# Patient Record
Sex: MALE | Race: WHITE | NOT HISPANIC OR LATINO | Employment: OTHER | URBAN - METROPOLITAN AREA
[De-identification: names, ages, dates, MRNs, and addresses within clinical notes are randomized per-mention and may not be internally consistent; named-entity substitution may affect disease eponyms.]

---

## 2017-02-14 ENCOUNTER — TELEPHONE (OUTPATIENT)
Dept: CARDIOLOGY | Facility: MEDICAL CENTER | Age: 67
End: 2017-02-14

## 2017-02-15 NOTE — TELEPHONE ENCOUNTER
----- Message from Kristi Saucedo sent at 2/14/2017  4:08 PM PST -----  Regarding: clarification of blood orders  Contact: [phone]  CHUCK/luz elena    Pt calling to ask if he needs additional blood work beyond what his lab slip states in preparation for 4/12 appt with RO. Please call pt to clarify, [phone].

## 2017-04-17 ENCOUNTER — HOSPITAL ENCOUNTER (OUTPATIENT)
Dept: LAB | Facility: MEDICAL CENTER | Age: 67
End: 2017-04-17
Attending: NURSE PRACTITIONER
Payer: MEDICARE

## 2017-04-17 ENCOUNTER — HOSPITAL ENCOUNTER (OUTPATIENT)
Dept: LAB | Facility: MEDICAL CENTER | Age: 67
End: 2017-04-17
Attending: UROLOGY
Payer: MEDICARE

## 2017-04-17 DIAGNOSIS — E78.2 MIXED HYPERLIPIDEMIA: ICD-10-CM

## 2017-04-17 LAB
ALBUMIN SERPL BCP-MCNC: 4 G/DL (ref 3.2–4.9)
ALBUMIN/GLOB SERPL: 1.4 G/DL
ALP SERPL-CCNC: 94 U/L (ref 30–99)
ALT SERPL-CCNC: 50 U/L (ref 2–50)
ANION GAP SERPL CALC-SCNC: 8 MMOL/L (ref 0–11.9)
AST SERPL-CCNC: 32 U/L (ref 12–45)
BILIRUB SERPL-MCNC: 0.8 MG/DL (ref 0.1–1.5)
BUN SERPL-MCNC: 15 MG/DL (ref 8–22)
CALCIUM SERPL-MCNC: 9.1 MG/DL (ref 8.5–10.5)
CHLORIDE SERPL-SCNC: 107 MMOL/L (ref 96–112)
CHOLEST SERPL-MCNC: 175 MG/DL (ref 100–199)
CO2 SERPL-SCNC: 25 MMOL/L (ref 20–33)
CREAT SERPL-MCNC: 1.08 MG/DL (ref 0.5–1.4)
GFR SERPL CREATININE-BSD FRML MDRD: >60 ML/MIN/1.73 M 2
GLOBULIN SER CALC-MCNC: 2.9 G/DL (ref 1.9–3.5)
GLUCOSE SERPL-MCNC: 94 MG/DL (ref 65–99)
HDLC SERPL-MCNC: 36 MG/DL
LDLC SERPL CALC-MCNC: 108 MG/DL
POTASSIUM SERPL-SCNC: 3.9 MMOL/L (ref 3.6–5.5)
PROT SERPL-MCNC: 6.9 G/DL (ref 6–8.2)
PSA SERPL-MCNC: 0.25 NG/ML (ref 0–4)
SODIUM SERPL-SCNC: 140 MMOL/L (ref 135–145)
TRIGL SERPL-MCNC: 155 MG/DL (ref 0–149)

## 2017-04-17 PROCEDURE — 84153 ASSAY OF PSA TOTAL: CPT

## 2017-04-18 ENCOUNTER — OFFICE VISIT (OUTPATIENT)
Dept: CARDIOLOGY | Facility: MEDICAL CENTER | Age: 67
End: 2017-04-18
Payer: MEDICARE

## 2017-04-18 VITALS
HEIGHT: 67 IN | HEART RATE: 64 BPM | OXYGEN SATURATION: 96 % | DIASTOLIC BLOOD PRESSURE: 74 MMHG | BODY MASS INDEX: 27.31 KG/M2 | SYSTOLIC BLOOD PRESSURE: 120 MMHG | WEIGHT: 174 LBS

## 2017-04-18 DIAGNOSIS — I21.4 NSTEMI (NON-ST ELEVATED MYOCARDIAL INFARCTION) (HCC): ICD-10-CM

## 2017-04-18 DIAGNOSIS — E78.2 MIXED HYPERLIPIDEMIA: ICD-10-CM

## 2017-04-18 DIAGNOSIS — F32.A ANXIETY AND DEPRESSION: ICD-10-CM

## 2017-04-18 DIAGNOSIS — I10 ESSENTIAL HYPERTENSION, BENIGN: ICD-10-CM

## 2017-04-18 DIAGNOSIS — Z95.2 S/P AVR (AORTIC VALVE REPLACEMENT): ICD-10-CM

## 2017-04-18 DIAGNOSIS — F41.9 ANXIETY AND DEPRESSION: ICD-10-CM

## 2017-04-18 DIAGNOSIS — Q25.1 COARCTATION OF AORTA: ICD-10-CM

## 2017-04-18 DIAGNOSIS — Z95.3 HISTORY OF HEART VALVE REPLACEMENT WITH BIOPROSTHETIC VALVE: ICD-10-CM

## 2017-04-18 DIAGNOSIS — I35.0 NONRHEUMATIC AORTIC VALVE STENOSIS: ICD-10-CM

## 2017-04-18 DIAGNOSIS — I50.21 ACUTE SYSTOLIC HEART FAILURE (HCC): ICD-10-CM

## 2017-04-18 PROCEDURE — 1036F TOBACCO NON-USER: CPT | Performed by: INTERNAL MEDICINE

## 2017-04-18 PROCEDURE — 99214 OFFICE O/P EST MOD 30 MIN: CPT | Performed by: INTERNAL MEDICINE

## 2017-04-18 PROCEDURE — 1101F PT FALLS ASSESS-DOCD LE1/YR: CPT | Performed by: INTERNAL MEDICINE

## 2017-04-18 PROCEDURE — 4040F PNEUMOC VAC/ADMIN/RCVD: CPT | Mod: 8P | Performed by: INTERNAL MEDICINE

## 2017-04-18 PROCEDURE — G8419 CALC BMI OUT NRM PARAM NOF/U: HCPCS | Performed by: INTERNAL MEDICINE

## 2017-04-18 PROCEDURE — G8432 DEP SCR NOT DOC, RNG: HCPCS | Performed by: INTERNAL MEDICINE

## 2017-04-18 ASSESSMENT — ENCOUNTER SYMPTOMS
CARDIOVASCULAR NEGATIVE: 1
ORTHOPNEA: 0
MUSCULOSKELETAL NEGATIVE: 1
PND: 0
SHORTNESS OF BREATH: 0
SORE THROAT: 0
WEAKNESS: 0
DIZZINESS: 0
COUGH: 0
CHILLS: 0
PALPITATIONS: 0
RESPIRATORY NEGATIVE: 1
GASTROINTESTINAL NEGATIVE: 1
HEMOPTYSIS: 0
CLAUDICATION: 0
BRUISES/BLEEDS EASILY: 0
EYES NEGATIVE: 1
CONSTITUTIONAL NEGATIVE: 1
NEUROLOGICAL NEGATIVE: 1
WHEEZING: 0
SPUTUM PRODUCTION: 0
FEVER: 0
LOSS OF CONSCIOUSNESS: 0
STRIDOR: 0

## 2017-04-18 NOTE — MR AVS SNAPSHOT
"        Juan WENDY Ata   2017 1:15 PM   Office Visit   MRN: [id number]    Department:  [facility]   Dept Phone:  [phone]    Description:  Male : 1950   Provider:  Michoacano Pantoja M.D.           Reason for Visit     Follow-Up           Allergies as of 2017     Allergen Noted Reactions    Zoloft 2011       Increased anxiety.      You were diagnosed with     Acute systolic heart failure (CMS-[facility])   [428.21.ICD-9-CM]       Anxiety and depression   [493427]       Nonrheumatic aortic valve stenosis   [860964]       Coarctation of aorta   [9644516]       Essential hypertension, benign   [401.1.ICD-9-CM]       History of heart valve replacement with bioprosthetic valve   [562491]       Mixed hyperlipidemia   [272.2.ICD-9-CM]       NSTEMI (non-ST elevated myocardial infarction) (CMS-[facility])   [999686]       S/P AVR (aortic valve replacement)   [503689]         Vital Signs     Blood Pressure Pulse Height Weight Body Mass Index Oxygen Saturation    120/74 mmHg 64 1.702 m (5' 7.01\") 78.926 kg (174 lb) 27.25 kg/m2 96%    Smoking Status                   Former Smoker           Basic Information     Date Of Birth Sex Race Ethnicity Preferred Language    1950 Male White Non- English      Problem List              ICD-10-CM Priority Class Noted - Resolved    Muscle, jerky movements (uncontrolled) G25.5   Unknown - Present    Muscle spasm M62.838   2011 - Present    Nodular hyperplasia of prostate gland N40.0   2011 - Present    Mixed hyperlipidemia E78.2   2013 - Present    Essential hypertension, benign I10   2013 - Present    History of prostate cancer Z85.46   2014 - Present    Chest pain R07.9 High  2016 - Present    Tachycardia R00.0   2016 - Present    Acute systolic heart failure, EF 40% I50.21 High  2016 - Present    NSTEMI (non-ST elevated myocardial infarction) (CMS-[facility]) I21.4 High  2016 - Present    Coarctation of aorta Q25.1   " 4/26/2016 - Present    History of heart valve replacement with bioprosthetic valve [Z95.4] Z95.4   5/4/2016 - Present    Aortic stenosis I35.0   5/13/2016 - Present    S/P AVR (aortic valve replacement) Z95.2   5/13/2016 - Present    Elevated TSH R94.6   6/1/2016 - Present    Anxiety and depression F41.9, F32.9   6/1/2016 - Present    Transaminitis R74.0   7/1/2016 - Present    Vitamin D insufficiency E55.9   7/1/2016 - Present    Elevated fasting glucose R73.01   7/1/2016 - Present    RLQ abdominal pain R10.31   7/1/2016 - Present    IFG (impaired fasting glucose) R73.01   7/1/2016 - Present      Health Maintenance        Date Due Completion Dates    IMM DTaP/Tdap/Td Vaccine (1 - Tdap) 7/15/1970 ---    COLONOSCOPY 7/15/2001 ---    IMM PNEUMOCOCCAL 65+ (ADULT) LOW/MEDIUM RISK SERIES (1 of 2 - PCV13) 7/15/2016 ---            Current Immunizations     SHINGLES VACCINE 10/6/2014      Below and/or attached are the medications your provider expects you to take. Review all of your home medications and newly ordered medications with your provider and/or pharmacist. Follow medication instructions as directed by your provider and/or pharmacist. Please keep your medication list with you and share with your provider. Update the information when medications are discontinued, doses are changed, or new medications (including over-the-counter products) are added; and carry medication information at all times in the event of emergency situations     Allergies:  ZOLOFT - (reactions not documented)               Medications  Valid as of: April 18, 2017 -  1:27 PM    Generic Name Brand Name Tablet Size Instructions for use    Amoxicillin (Cap) AMOXIL 500 MG         Aspirin (Chew Tab) ASA 81 MG Take 1 Tab by mouth every day.        Atorvastatin Calcium (Tab) LIPITOR 80 MG Take 1 Tab by mouth every evening.        Cholecalciferol (Tab) VITAMIN D 400 UNIT Take 1,000 Units by mouth every day.        Hydrocodone-Acetaminophen (Tab) NORCO  5-325 MG         Lisinopril (Tab) PRINIVIL 20 MG Take 1 Tab by mouth every day.        Lisinopril (Tab) PRINIVIL 5 MG         Metoprolol Tartrate (Tab) LOPRESSOR 25 MG Take 1 Tab by mouth 2 Times a Day.        Multiple Vitamins-Minerals (Tab) THERAGRAN-M  Take 1 Tab by mouth every day.        Omega-3 Fatty Acids (Cap) fish oil 1000 MG Take 1,000 mg by mouth every day.        .                 Medicines prescribed today were sent to:     [facility] [address] [place] [address] (), [place] [address]    [address] () [place] [address]    Phone: [phone] Fax: [phone]    Open 24 Hours?: No      Medication refill instructions:       If your prescription bottle indicates you have medication refills left, it is not necessary to call your provider’s office. Please contact your pharmacy and they will refill your medication.    If your prescription bottle indicates you do not have any refills left, you may request refills at any time through one of the following ways: The online [employer] system (except Urgent Care), by calling your provider’s office, or by asking your pharmacy to contact your provider’s office with a refill request. Medication refills are processed only during regular business hours and may not be available until the next business day. Your provider may request additional information or to have a follow-up visit with you prior to refilling your medication.   *Please Note: Medication refills are assigned a new Rx number when refilled electronically. Your pharmacy may indicate that no refills were authorized even though a new prescription for the same medication is available at the pharmacy. Please request the medicine by name with the pharmacy before contacting your provider for a refill.           [employer] Access Code: Activation code not generated  Current [employer] Status: Active
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
## 2017-04-24 ENCOUNTER — TELEPHONE (OUTPATIENT)
Dept: VASCULAR LAB | Facility: MEDICAL CENTER | Age: 67
End: 2017-04-24

## 2017-06-05 ENCOUNTER — TELEPHONE (OUTPATIENT)
Dept: VASCULAR LAB | Facility: MEDICAL CENTER | Age: 67
End: 2017-06-05

## 2017-06-05 DIAGNOSIS — Z95.2 S/P AVR (AORTIC VALVE REPLACEMENT): ICD-10-CM

## 2017-06-05 DIAGNOSIS — Z95.3 HISTORY OF HEART VALVE REPLACEMENT WITH BIOPROSTHETIC VALVE: ICD-10-CM

## 2017-07-03 ENCOUNTER — PATIENT OUTREACH (OUTPATIENT)
Dept: HEALTH INFORMATION MANAGEMENT | Facility: OTHER | Age: 67
End: 2017-07-03

## 2017-07-03 NOTE — PROGRESS NOTES
Attempt #:1    WebIZ Checked & Epic Updated: yes  HealthConnect Verified: yes  Verify PCP: yes    Communication Preference Obtained: yes     Review Care Team: yes    Annual Wellness Visit Scheduling  1. Scheduling Status:Scheduled    Care Gap Scheduling (Attempt to Schedule EACH Overdue Care Gap!)     Health Maintenance Due   Topic Date Due   • Annual Wellness Visit  Scheduled    • IMM DTaP/Tdap/Td Vaccine (1 - Tdap) Pt wants to discuss with pcp   • COLONOSCOPY  Pt wants to discuss with pcp    • IMM PNEUMOCOCCAL 65+ (ADULT) LOW/MEDIUM RISK SERIES (1 of 2 - PCV13) Pt wants to discuss with pcp          [employer] Activation: already active  Chat& (ChatAnd) Kindra: no  Virtual Visits: no  Opt In to Text Messages: no

## 2017-07-06 ENCOUNTER — TELEPHONE (OUTPATIENT)
Dept: MEDICAL GROUP | Age: 67
End: 2017-07-06

## 2017-07-06 NOTE — TELEPHONE ENCOUNTER
ANNUAL WELLNESS VISIT PRE-VISIT PLANNING     1.  Reviewed note from last office visit with PCP: YES    2.  If any orders were placed at last visit, do we have Results/Consult Notes?        •  Labs - Labs ordered, completed and results are in chart.       •  Imaging - Imaging was not ordered at last office visit.       •  Referrals - No referrals were ordered at last office visit.    3.  Immunizations were updated in [facility] using WebIZ?: Yes       •  WebIZ Recommendations: FLU, PREVNAR (PCV13)  and TD       •  Is patient due for Tdap? NO       •  Is patient due for Shingles? NO     4.  Patient is due for the following Health Maintenance Topics:   Health Maintenance Due   Topic Date Due   • Annual Wellness Visit  1950   • IMM DTaP/Tdap/Td Vaccine (1 - Tdap) 07/15/1970   • COLONOSCOPY  07/15/2001   • IMM PNEUMOCOCCAL 65+ (ADULT) LOW/MEDIUM RISK SERIES (1 of 2 - PCV13) 07/15/2016       - Patient has completed TDAP and ZOSTAVAX (Shingles) Immunization(s) per WebIZ. Chart has been updated.    5.  Reviewed/Updated the following with patient:       •   Preferred Pharmacy? YES       •   Preferred Lab? YES       •   Medications? YES. Was Abstract Encounter opened and chart updated? YES       •   Social History? YES. Was Abstract Encounter opened and chart updated? YES       •   Family History? YES. Was Abstract Encounter opened and chart updated? YES    6.  Care Team Updated:       •   DME Company (gait device, O2, CPAP, etc.): N\A       •   Other Specialists (eye doctor, derm, GYN, cardiology, endo, etc): YES    7.  Patient has the following Care Path diagnoses on Problem List:  NONE    8.  Specialty Comments was updated with diagnosis information provided by [facility]: NO    9.  Patient was advised: “This is a free wellness visit. The provider will screen for medical conditions to help you stay healthy. If you have other concerns to address you may be asked to discuss these at a separate visit or there may be an additional  fee.”     6.  Patient was informed to arrive 15 min prior to their scheduled appointment and bring in their medication bottles.

## 2017-07-12 ENCOUNTER — OFFICE VISIT (OUTPATIENT)
Dept: MEDICAL GROUP | Age: 67
End: 2017-07-12
Payer: MEDICARE

## 2017-07-12 VITALS
OXYGEN SATURATION: 96 % | HEART RATE: 66 BPM | WEIGHT: 177 LBS | SYSTOLIC BLOOD PRESSURE: 116 MMHG | TEMPERATURE: 98.8 F | HEIGHT: 66 IN | DIASTOLIC BLOOD PRESSURE: 80 MMHG | BODY MASS INDEX: 28.45 KG/M2

## 2017-07-12 DIAGNOSIS — F32.A ANXIETY AND DEPRESSION: ICD-10-CM

## 2017-07-12 DIAGNOSIS — R73.01 IFG (IMPAIRED FASTING GLUCOSE): ICD-10-CM

## 2017-07-12 DIAGNOSIS — L98.9 SKIN LESION OF FACE: ICD-10-CM

## 2017-07-12 DIAGNOSIS — Q25.1 COARCTATION OF AORTA: ICD-10-CM

## 2017-07-12 DIAGNOSIS — Z85.46 HISTORY OF PROSTATE CANCER: ICD-10-CM

## 2017-07-12 DIAGNOSIS — I25.2 HISTORY OF NON-ST ELEVATION MYOCARDIAL INFARCTION (NSTEMI): ICD-10-CM

## 2017-07-12 DIAGNOSIS — I10 ESSENTIAL HYPERTENSION, BENIGN: ICD-10-CM

## 2017-07-12 DIAGNOSIS — R25.1 TREMOR OF RIGHT HAND: ICD-10-CM

## 2017-07-12 DIAGNOSIS — Z95.3 HISTORY OF HEART VALVE REPLACEMENT WITH BIOPROSTHETIC VALVE: ICD-10-CM

## 2017-07-12 DIAGNOSIS — F41.9 ANXIETY AND DEPRESSION: ICD-10-CM

## 2017-07-12 DIAGNOSIS — Z00.00 MEDICARE ANNUAL WELLNESS VISIT, INITIAL: ICD-10-CM

## 2017-07-12 DIAGNOSIS — E55.9 VITAMIN D INSUFFICIENCY: ICD-10-CM

## 2017-07-12 DIAGNOSIS — E78.2 MIXED HYPERLIPIDEMIA: ICD-10-CM

## 2017-07-12 DIAGNOSIS — Z23 NEED FOR VACCINATION: ICD-10-CM

## 2017-07-12 DIAGNOSIS — R74.01 TRANSAMINITIS: ICD-10-CM

## 2017-07-12 DIAGNOSIS — I35.0 NONRHEUMATIC AORTIC VALVE STENOSIS: ICD-10-CM

## 2017-07-12 PROCEDURE — G0439 PPPS, SUBSEQ VISIT: HCPCS | Mod: 25 | Performed by: FAMILY MEDICINE

## 2017-07-12 PROCEDURE — G0009 ADMIN PNEUMOCOCCAL VACCINE: HCPCS | Performed by: FAMILY MEDICINE

## 2017-07-12 PROCEDURE — 90670 PCV13 VACCINE IM: CPT | Performed by: FAMILY MEDICINE

## 2017-07-12 ASSESSMENT — PATIENT HEALTH QUESTIONNAIRE - PHQ9: CLINICAL INTERPRETATION OF PHQ2 SCORE: 0

## 2017-07-12 NOTE — MR AVS SNAPSHOT
"        Juan Berumen   2017 4:00 PM   Office Visit   MRN: [id number]    Department:  [address] [place] [address]   Dept Phone:  [phone]    Description:  Male : 1950   Provider:  Manda eHath M.D.; [facility]            Reason for Visit     Annual Wellness Visit           Allergies as of 2017     Allergen Noted Reactions    Zoloft 2011       Increased anxiety.      You were diagnosed with     Medicare annual wellness visit, initial   [470186]       Coarctation of aorta   [0996550]       History of heart valve replacement with bioprosthetic valve   [285014]       Need for vaccination   [483670]       Mixed hyperlipidemia   [272.2.ICD-9-CM]       Essential hypertension, benign   [401.1.ICD-9-CM]       History of prostate cancer   [263435]       Nodular hyperplasia of prostate gland   [628176]       Tremor of right hand   [6340673]       IFG (impaired fasting glucose)   [057779]       Vitamin D insufficiency   [303847]       Transaminitis   [950166]       Anxiety and depression   [874716]       Skin lesion of face   [580867]         Vital Signs     Blood Pressure Pulse Temperature Height Weight Body Mass Index    116/80 mmHg 66 37.1 °C (98.8 °F) 1.676 m (5' 5.98\") 80.287 kg (177 lb) 28.58 kg/m2    Oxygen Saturation Smoking Status                96% Former Smoker          Basic Information     Date Of Birth Sex Race Ethnicity Preferred Language    1950 Male White Non- English      Your appointments     2018  4:00 PM   Established Patient with Manda Heath M.D.   [address] [id number] [phone]           You will be receiving a confirmation call a few days before your appointment from our automated call confirmation system.              Problem List              ICD-10-CM Priority Class Noted - Resolved    Mixed hyperlipidemia E78.2   2013 - Present    Essential hypertension, benign I10   2013 - " Present    History of prostate cancer Z85.46   7/30/2014 - Present    Tachycardia R00.0   4/23/2016 - Present    Acute systolic heart failure, EF 40% I50.21 High  4/25/2016 - Present    NSTEMI (non-ST elevated myocardial infarction) (CMS-HCC) I21.4 High  4/25/2016 - Present    Coarctation of aorta Q25.1   4/26/2016 - Present    History of heart valve replacement with bioprosthetic valve [Z95.4] Z95.4   5/4/2016 - Present    Aortic stenosis I35.0   5/13/2016 - Present    S/P AVR (aortic valve replacement) Z95.2   5/13/2016 - Present    Elevated TSH R94.6   6/1/2016 - Present    Anxiety and depression F41.9, F32.9   6/1/2016 - Present    Transaminitis R74.0   7/1/2016 - Present    Vitamin D insufficiency E55.9   7/1/2016 - Present    IFG (impaired fasting glucose) R73.01   7/1/2016 - Present    Tremor of right hand R25.1   7/12/2017 - Present      Health Maintenance        Date Due Completion Dates    COLONOSCOPY 7/15/2001 ---    IMM PNEUMOCOCCAL 65+ (ADULT) LOW/MEDIUM RISK SERIES (1 of 2 - PCV13) 7/15/2016 ---    IMM INFLUENZA (1) 9/1/2017 ---    IMM DTaP/Tdap/Td Vaccine (2 - Td) 9/6/2025 9/6/2015            Current Immunizations     13-VALENT PCV PREVNAR  Incomplete    SHINGLES VACCINE 10/6/2014    Tdap Vaccine 9/6/2015      Below and/or attached are the medications your provider expects you to take. Review all of your home medications and newly ordered medications with your provider and/or pharmacist. Follow medication instructions as directed by your provider and/or pharmacist. Please keep your medication list with you and share with your provider. Update the information when medications are discontinued, doses are changed, or new medications (including over-the-counter products) are added; and carry medication information at all times in the event of emergency situations     Allergies:  ZOLOFT - (reactions not documented)               Medications  Valid as of: July 12, 2017 -  5:15 PM    Generic Name Brand Name  Tablet Size Instructions for use    Amoxicillin (Cap) AMOXIL 500 MG         Aspirin (Chew Tab) ASA 81 MG Take 1 Tab by mouth every day.        Atorvastatin Calcium (Tab) LIPITOR 80 MG Take 1 Tab by mouth every evening.        Cholecalciferol (Tab) VITAMIN D 400 UNIT Take 1,000 Units by mouth every day.        Hydrocodone-Acetaminophen (Tab) NORCO 5-325 MG         Lisinopril (Tab) PRINIVIL 20 MG Take 1 Tab by mouth every day.        Metoprolol Tartrate (Tab) LOPRESSOR 25 MG Take 1 Tab by mouth 2 Times a Day.        Multiple Vitamins-Minerals (Tab) THERAGRAN-M  Take 1 Tab by mouth every day.        Omega-3 Fatty Acids (Cap) fish oil 1000 MG Take 1,000 mg by mouth every day.        .                 Medicines prescribed today were sent to:     [address] (), [place] [id number] () [place] [address]    Phone: [phone] Fax: [phone]    Open 24 Hours?: No      Medication refill instructions:       If your prescription bottle indicates you have medication refills left, it is not necessary to call your provider’s office. Please contact your pharmacy and they will refill your medication.    If your prescription bottle indicates you do not have any refills left, you may request refills at any time through one of the following ways: The online [employer] system (except Urgent Care), by calling your provider’s office, or by asking your pharmacy to contact your provider’s office with a refill request. Medication refills are processed only during regular business hours and may not be available until the next business day. Your provider may request additional information or to have a follow-up visit with you prior to refilling your medication.   *Please Note: Medication refills are assigned a new Rx number when refilled electronically. Your pharmacy may indicate that no refills were authorized even though a new prescription for the same medication is available at the pharmacy.  Please request the medicine by name with the pharmacy before contacting your provider for a refill.        Your To Do List     Future Labs/Procedures Complete By Expires    TSH  As directed 7/12/2018    VITAMIN D,25 HYDROXY  As directed 1/12/2018      Referral     A referral request has been sent to our patient care coordination department. Please allow 3-5 business days for us to process this request and contact you either by phone or mail. If you do not hear from us by the 5th business day, please call us at [phone].           Vuze Access Code: Activation code not generated  Current Vuze Status: Active

## 2017-07-12 NOTE — PROGRESS NOTES
Chief Complaint   Patient presents with   • Annual Wellness Visit         HPI:  Juan Berumen is a 66 y.o. male here for Medicare Annual Wellness Visit    Coarctation of aorta, aortic valve stenosis, history of heart valve replacement, hx of heart failure and hx of NSTEMI. Sees cardiology annually- Dr. Pantoja. Released from Dr. Bloch.  No current chest pain or unusual shortness of breath. Has been otherwise stable.   Hypertension- BP has been stable. He continues on metoprolol twice daily, lisinopril 20 mg daily. Tolerates medication well.     Hyperlipidemia- has been stable on lipitor 80 mg daily. Tolerates well.     Hx of prostate cancer- followed by urology. He had his prostate removed.     Tremor- has had mostly in upper extremities. Sometimes can also be a muscle spasm/jerky movements -has had for 20 -30 years. Had some evaluation in the past. Comes and goes, not worse. Right arm area. When trying to concentrate, can cause some shaking. More a tremor than a spasm per patient. No resting tremor.Did not see neurology. No family hx of tremor or parkinson's. Father had tremor from medication. Had EEG for migraines in past.     No further having RLQ pain symptoms.     IFG- elevated glucose on labs.     Vitamin D low on prior labs.     Elevated liver enzymes on prior labs. Does not drink alcohol regularly.     Anxiety/depression- not on medication, back at work. Was associated with prior cardiac issues. Currently doing well, stable.     No recent issues migraines- gets a visual aura but no headache symptoms.     Skin lesion on right side of nose area close to his eye. Hx of accident injury close to eye, had stitches in area.     Patient Active Problem List    Diagnosis Date Noted   • Acute systolic heart failure, EF 40% 04/25/2016     Priority: High   • NSTEMI (non-ST elevated myocardial infarction) (CMS-[facility]) 04/25/2016     Priority: High   • History of non-ST elevation myocardial infarction (NSTEMI) 07/19/2017   •  Skin lesion of face 07/19/2017   • Nonrheumatic aortic valve stenosis 07/19/2017   • Tremor of right hand 07/12/2017   • Transaminitis 07/01/2016   • Vitamin D insufficiency 07/01/2016   • IFG (impaired fasting glucose) 07/01/2016   • Elevated TSH 06/01/2016   • Anxiety and depression 06/01/2016   • Aortic stenosis 05/13/2016   • S/P AVR (aortic valve replacement) 05/13/2016   • History of heart valve replacement with bioprosthetic valve [Z95.4] 05/04/2016   • Coarctation of aorta 04/26/2016   • Tachycardia 04/23/2016   • History of prostate cancer 07/30/2014   • Mixed hyperlipidemia 02/01/2013   • Essential hypertension, benign 02/01/2013       Current Outpatient Prescriptions   Medication Sig Dispense Refill   • atorvastatin (LIPITOR) 80 MG tablet Take 1 Tab by mouth every evening. 30 Tab 11   • Omega-3 Fatty Acids (FISH OIL) 1000 MG Cap capsule Take 1,000 mg by mouth every day.     • metoprolol (LOPRESSOR) 25 MG Tab Take 1 Tab by mouth 2 Times a Day. 60 Tab 11   • aspirin (ASA) 81 MG Chew Tab chewable tablet Take 1 Tab by mouth every day. 100 Tab 11   • lisinopril (PRINIVIL) 20 MG Tab Take 1 Tab by mouth every day. 30 Tab 11   • Cholecalciferol (VITAMIN D) 400 UNIT Tab Take 1,000 Units by mouth every day.     • therapeutic multivitamin-minerals (THERAGRAN-M) TABS Take 1 Tab by mouth every day.     • amoxicillin (AMOXIL) 500 MG Cap      • hydrocodone-acetaminophen (NORCO) 5-325 MG Tab per tablet        No current facility-administered medications for this visit.        Patient is taking medications as noted in medication list.  Current supplements as per medication list.   Chronic narcotic pain medicines: no    Allergies: Zoloft    Current social contact/activities: going to [employer] with spouse     Is patient current with immunizations?  No, due for PREVNAR (PCV13) . Patient is interested in receiving PREVNAR (PCV13)  today.     He  reports that he quit smoking about 9 years ago. His smoking use included  Cigarettes. He has a 7.5 pack-year smoking history. He has never used smokeless tobacco. He reports that he drinks alcohol. He reports that he does not use illicit drugs.  Counseling given: Yes        DPA/Advanced Directive:  Patient does not have an Advanced Directive.  A packet and workshop information was given on Advanced Directives.    ROS:    Gait: Uses no assistive device   Ostomy: no   Other tubes: no   Amputations: no   Chronic oxygen use: no   Last eye exam: 2011   Wears hearing aids: no   : Reports incontinence.       Depression Screening    Little interest or pleasure in doing things?  0 - not at all  Feeling down, depressed, or hopeless?  0 - not at all  Patient Health Questionnaire Score: 0  If depressive symptoms identified deferred to follow up visit unless specifically addressed in assessment and plan.    Interpretation of PHQ-9 Total Score   Score Severity   1-4 Minimal Depression   5-9 Mild Depression   10-14 Moderate Depression   15-19 Moderately Severe Depression   20-27 Severe Depression    Screening for Cognitive Impairment    Three Minute Recall (banana, sunrise, fence)  3/3    Draw clock face with all 12 numbers set to the hand to show 10 minutes past 11 o'clock   5/5  If cognitive concerns identified deferred to follow up visit unless specifically addressed in assessment and plan.    Fall Risk Assessment    Has the patient had two or more falls in the last year or any fall with injury in the last year?  No  If Fall Risk identified deferred to follow up visit unless specifically addressed in assessment and plan.    Safety Assessment    Throw rugs on floor.  Yes  Handrails on all stairs.  Yes  Good lighting in all hallways.  Yes  Difficulty hearing.  No  Patient counseled about all safety risks that were identified.    Functional Assessment ADLs    Are there any barriers preventing you from cooking for yourself or meeting nutritional needs?  No.    Are there any barriers preventing you from  driving safely or obtaining transportation?  No.    Are there any barriers preventing you from using a telephone or calling for help?  No.    Are there any barriers preventing you from shopping?  No.    Are there any barriers preventing you from taking care of your own finances?  No.    Are there any barriers preventing you from managing your medications?  No.    Are currently engaging any exercise or physical activity?  Yes.  Walking dogs once-twice a day.    Health Maintenance Summary                Annual Wellness Visit Overdue 1950     COLONOSCOPY Overdue 7/15/2001     IMM PNEUMOCOCCAL 65+ (ADULT) LOW/MEDIUM RISK SERIES Overdue 7/15/2016     IMM INFLUENZA Next Due 9/1/2017     IMM DTaP/Tdap/Td Vaccine Next Due 9/6/2025      Done 9/6/2015 Imm Admin: Tdap Vaccine          Patient Care Team:  Manda Heath M.D. as PCP - General (Family Medicine)  Michoacano Pantoja M.D. as Consulting Physician (Cardiology)  [facility] M.D. as Consulting Physician (Urology)  Michael J Bloch, M.D. as Consulting Physician (Cardiology)  [facility] as Consulting Physician (Gastroenterology)    Social History   Substance Use Topics   • Smoking status: Former Smoker -- 0.50 packs/day for 15 years     Types: Cigarettes     Quit date: 04/11/2008   • Smokeless tobacco: Never Used   • Alcohol Use: 0.0 oz/week     0 Standard drinks or equivalent per week      Comment: rare     Family History   Problem Relation Age of Onset   • Lung Disease Mother      Severe, chronic bronchitis   • Hyperlipidemia Mother    • GI Mother      colon polyps   • Other Mother      heart valve/migraine   • Psychiatry Father      Depression   • Hyperlipidemia Brother    • Stroke Maternal Aunt 55   • Stroke Maternal Grandmother 55   • Cancer Neg Hx    • GI Brother      colon polyps     He  has a past medical history of Hyperlipidemia; Hypertension; Anxiety; Depression; Heart murmur; Headache; Migraine; Muscle, jerky movements  "(uncontrolled) (1974); Prostate cancer (CMS-HCC); NSTEMI (non-ST elevated myocardial infarction) (CMS-HCC) (4/25/2016); Aortic valve stenosis; Prostate cancer (CMS-HCC); Aortic coarctation; Chest pain (4/23/2016); Nodular hyperplasia of prostate gland (4/11/2011); Tachycardia (4/23/2016); and RLQ abdominal pain (7/1/2016). He also has no past medical history of GERD (gastroesophageal reflux disease), Tremor, essential, Thyroid disease, Ulcer (CMS-HCC), or Urinary tract infection, site not specified.   Past Surgical History   Procedure Laterality Date   • Hernia repair       Umbilical   • Other       prostate surgery   • Aortic valve replacement  4/28/2016     Procedure: AORTIC VALVE REPLACEMENT WITH JENNIFER;  Surgeon: Guero Bradford M.D.;  Location: SURGERY [facility];  Service:          Exam:     Blood pressure 116/80, pulse 66, temperature 37.1 °C (98.8 °F), height 1.676 m (5' 5.98\"), weight 80.287 kg (177 lb), SpO2 96 %. Body mass index is 28.58 kg/(m^2).  Hearing good.    Dentition good  Alert, oriented in no acute distress.  Eye contact is good, speech goal directed, affect calm  GEN: Well-developed well-nourished male.  HEAD AND NECK:  Ears normal.  PERRL, EOMI. Throat, oral cavity and tongue normal.  Neck supple. No adenopathy or masses in the neck or   supraclavicular regions.  No carotid bruits. No thyromegaly.   NEURO: Cranial nerves are normal. Neck supple. DTR's normal and symmetric.  Normal gait. Tremor of R>L with activity, no tremor at rest, occasional spastic movement of upper extremities noted with movements.   CHEST:  Clear, good air entry, no wheezes, rhonci or rales.   HEART:  S1 and S2 normal, no murmurs, clicks, gallops or rubs.  Regular rate and rhythm.  No edema or JVD.   ABDOMEN:  Soft without tenderness, guarding, mass or organomegaly. No CVA tenderness or inguinal adenopathy.   EXTREMITIES:  Extremities, reflexes and peripheral pulses are normal.    SKIN:  No rashes.  Right side of nose " with raised skin lesion.       Assessment and Plan. The following treatment and monitoring plan is recommended:      1. Medicare annual wellness visit, initial   -Discussed healthy diet and routine exercise.   Initial Wellness Visit - Includes PPPS ()   2. Coarctation of aorta - stable, continue current medications.    Initial Wellness Visit - Includes PPPS ()   3. Nonrheumatic aortic valve stenosis- hx of aortic valve replacement. Stable. Follow up with cardiology.      4. History of heart valve replacement with bioprosthetic valve [Z95.4] - stable, follow up with cardiology.  Initial Wellness Visit - Includes PPPS ()   5. History of non-ST elevation myocardial infarction (NSTEMI) - stable. Continue current medications. Follow up with cardiology.     6. Essential hypertension, benign - stable, continue current medications.   Initial Wellness Visit - Includes PPPS ()   7. Mixed hyperlipidemia - stable, continue current medications.   Initial Wellness Visit - Includes PPPS ()    TSH   8. Tremor of right hand- has been overall stable, chronic issues. Discussed options for evaluation. Consider neurology referral, patient will consider.  Initial Wellness Visit - Includes PPPS ()   9. History of prostate cancer- stable. Follow up with urology.   Initial Wellness Visit - Includes PPPS ()   10. IFG (impaired fasting glucose)- stable. Lifestyle changes- healthy diet and routine exercise.  Initial Wellness Visit - Includes PPPS ()   11. Vitamin D insufficiency- stable, recheck labs.   Initial Wellness Visit - Includes PPPS ()    VITAMIN D,25 HYDROXY   12. Transaminitis- improved, stable. Monitor.   Initial Wellness Visit - Includes PPPS ()   13. Anxiety and depression - improved, stable. Not on medication therapy. Monitor.  Initial Wellness Visit - Includes PPPS ()   14. Skin lesion of face- appears in area of prior injury. Due to location, refer to dermatology for  treatment.  Initial Wellness Visit - Includes PPPS ()    REFERRAL TO DERMATOLOGY   15. Need for vaccination  Pneumococcal Conjugate Vaccine 13-Valent <6yo IM    Initial Wellness Visit - Includes PPPS ()       Services suggested: No services needed at this time  Health Care Screening recommendations as per orders if indicated.  Referrals offered: PT/OT/Nutrition counseling/Behavioral Health/Smoking cessation as per orders if indicated.    Discussion today about general wellness and lifestyle habits:    · Prevent falls and reduce trip hazards; Cautioned about securing or removing rugs.  · Have a working fire alarm and carbon monoxide detector;   · Engage in regular physical activity and social activities       Follow-up: Return in about 6 months (around 1/12/2018).

## 2017-07-12 NOTE — Clinical Note
[facility]  Manda Heath M.D.  [address]  [place] [address]  Fax: [phone]   Authorization for Release/Disclosure of   Protected Health Information   Name: JUAN BERUMEN : 1950 SSN: [id number]   Address:  [place] [address] Phone:    [phone] (home)    I authorize the entity listed below to release/disclose the PHI below to:   [facility]/Manda Heath M.D. and Manda Heath M.D.   Provider or Entity Name:  [facility]   Address   City, [facility]   Phone:      Fax:  [phone]     Reason for request: continuity of care   Information to be released:    [ XX ] LAST COLONOSCOPY,  including any PATH REPORT and follow-up  [  ] LAST FIT/COLOGUARD RESULT [  ] LAST DEXA  [  ] LAST MAMMOGRAM  [  ] LAST PAP  [  ] LAST LABS [  ] RETINA EXAM REPORT  [  ] IMMUNIZATION RECORDS  [  ] Release all info      [  ] Check here and initial the line next to each item to release ALL health information INCLUDING  _____ Care and treatment for drug and / or alcohol abuse  _____ HIV testing, infection status, or AIDS  _____ Genetic Testing    DATES OF SERVICE OR TIME PERIOD TO BE DISCLOSED: ____2009_________  I understand and acknowledge that:  * This Authorization may be revoked at any time by you in writing, except if your health information has already been used or disclosed.  * Your health information that will be used or disclosed as a result of you signing this authorization could be re-disclosed by the recipient. If this occurs, your re-disclosed health information may no longer be protected by State or Federal laws.  * You may refuse to sign this Authorization. Your refusal will not affect your ability to obtain treatment.  * This Authorization becomes effective upon signing and will  on (date) __________.      If no date is indicated, this Authorization will  one (1) year from the signature date.    Name: Juan Berumen    Signature:   Date:     2017       PLEASE FAX  REQUESTED RECORDS BACK TO: [phone]

## 2017-07-19 PROBLEM — L98.9 SKIN LESION OF FACE: Status: ACTIVE | Noted: 2017-07-19

## 2017-07-19 PROBLEM — I35.0 NONRHEUMATIC AORTIC VALVE STENOSIS: Status: ACTIVE | Noted: 2017-07-19

## 2017-07-19 PROBLEM — I25.2 HISTORY OF NON-ST ELEVATION MYOCARDIAL INFARCTION (NSTEMI): Status: ACTIVE | Noted: 2017-07-19

## 2017-07-25 ENCOUNTER — TELEPHONE (OUTPATIENT)
Dept: VASCULAR LAB | Facility: MEDICAL CENTER | Age: 67
End: 2017-07-25

## 2017-08-02 DIAGNOSIS — I10 ESSENTIAL HYPERTENSION: ICD-10-CM

## 2017-08-03 RX ORDER — LISINOPRIL 20 MG/1
TABLET ORAL
Qty: 30 TAB | Refills: 11 | Status: SHIPPED | OUTPATIENT
Start: 2017-08-03 | End: 2018-08-17 | Stop reason: SDUPTHER

## 2017-08-22 ENCOUNTER — TELEPHONE (OUTPATIENT)
Dept: VASCULAR LAB | Facility: MEDICAL CENTER | Age: 67
End: 2017-08-22

## 2017-08-28 DIAGNOSIS — I10 ESSENTIAL HYPERTENSION, BENIGN: ICD-10-CM

## 2017-09-11 ENCOUNTER — HOSPITAL ENCOUNTER (OUTPATIENT)
Dept: CARDIOLOGY | Facility: MEDICAL CENTER | Age: 67
End: 2017-09-11
Attending: NURSE PRACTITIONER
Payer: MEDICARE

## 2017-09-11 DIAGNOSIS — Z95.3 HISTORY OF HEART VALVE REPLACEMENT WITH BIOPROSTHETIC VALVE: ICD-10-CM

## 2017-09-11 DIAGNOSIS — Z95.2 S/P AVR (AORTIC VALVE REPLACEMENT): ICD-10-CM

## 2017-09-11 LAB
LV EJECT FRACT  99904: 65
LV EJECT FRACT MOD 2C 99903: 54.3
LV EJECT FRACT MOD 4C 99902: 67.93
LV EJECT FRACT MOD BP 99901: 64.81

## 2017-09-11 PROCEDURE — 93306 TTE W/DOPPLER COMPLETE: CPT | Mod: 26 | Performed by: INTERNAL MEDICINE

## 2017-09-11 PROCEDURE — 93306 TTE W/DOPPLER COMPLETE: CPT

## 2017-09-12 ENCOUNTER — TELEPHONE (OUTPATIENT)
Dept: VASCULAR LAB | Facility: MEDICAL CENTER | Age: 67
End: 2017-09-12

## 2017-09-12 ENCOUNTER — TELEPHONE (OUTPATIENT)
Dept: MEDICAL GROUP | Age: 67
End: 2017-09-12

## 2017-09-12 DIAGNOSIS — C61 PROSTATE CANCER (HCC): ICD-10-CM

## 2017-09-12 DIAGNOSIS — R97.20 ELEVATED PSA: ICD-10-CM

## 2017-09-12 NOTE — TELEPHONE ENCOUNTER
Echo reviewed.   Well functioning bio avr and only mild dilation in ascending aorta (3.6 cm)    Continue medical mangement per cards  Repeat CTA in april 2018 per previous.  Repeat echo in 2 years unless cards recommends otherwise.    Michael Bloch, MD  [facility]

## 2017-09-12 NOTE — LETTER
September 14, 2017        Juan Berumen  [address] [facility] [address]        Dear Juan:    Dr. Heath's office has been unable to reach you. Please call our office at [phone]. Thank you.    If you have any questions or concerns, please don't hesitate to call.        Sincerely,        Modesta Gallardo, [facility]

## 2017-09-12 NOTE — TELEPHONE ENCOUNTER
1. Caller Name: Juan Berumen                                         Call Back Number: [phone] (home)       Patient approves a detailed voicemail message: no    2. SPECIFIC Action To Be Taken: Referral pending, please sign.    3. Diagnosis/Clinical Reason for Request:   C61 (ICD-10-CM) - Prostate cancer (CMS-HCC)   R97.2 (ICD-10-CM) - PSA elevation     4. Specialty & Provider Name/Lab/Imaging Location: [facility]    5. Is appointment scheduled for requested order/referral: yes - In 2 weeks    Patient informed they will receive a return phone call from the office ONLY if there are any questions before processing their request. Advised to call back if they haven't received a call from the referral department in 5 days.

## 2017-09-13 ENCOUNTER — TELEPHONE (OUTPATIENT)
Dept: VASCULAR LAB | Facility: MEDICAL CENTER | Age: 67
End: 2017-09-13

## 2017-09-13 NOTE — TELEPHONE ENCOUNTER
Phone Number Called: [phone] (home)     Message: Tried to call patient to inform the referral was put in but got a busy tone. Will try again later.    Left Message for patient to call back: no

## 2017-09-13 NOTE — TELEPHONE ENCOUNTER
Emmyw with the patient. Informed that his echo results were read and his valve looks Ok per Dr. Bloch. Next echo sept 2019 & CTA April 2018. He will be contacted by scheduling as those dates get closer.   KATE Finnegan.

## 2017-09-14 ENCOUNTER — RX ONLY (OUTPATIENT)
Age: 67
Setting detail: RX ONLY
End: 2017-09-14

## 2017-09-14 PROBLEM — C44.319 BASAL CELL CARCINOMA OF SKIN OF OTHER PARTS OF FACE: Status: ACTIVE | Noted: 2017-09-14

## 2017-09-14 NOTE — TELEPHONE ENCOUNTER
Phone Number Called: [phone] (home)     Message: LM for patient to return call. Second attempt. Letter out.    Left Message for patient to call back: yes

## 2017-09-25 ENCOUNTER — HOSPITAL ENCOUNTER (OUTPATIENT)
Dept: LAB | Facility: MEDICAL CENTER | Age: 67
End: 2017-09-25
Attending: UROLOGY
Payer: MEDICARE

## 2017-09-25 LAB — PSA SERPL-MCNC: 0.41 NG/ML (ref 0–4)

## 2017-09-25 PROCEDURE — 84153 ASSAY OF PSA TOTAL: CPT

## 2017-09-25 PROCEDURE — 36415 COLL VENOUS BLD VENIPUNCTURE: CPT

## 2017-09-28 PROBLEM — D49.2 NEOPLASM OF UNSPECIFIED BEHAVIOR OF BONE, SOFT TISSUE, AND SKIN: Status: RESOLVED | Noted: 2017-09-14 | Resolved: 2017-09-28

## 2017-10-11 ENCOUNTER — ANTICOAGULATION MONITORING (OUTPATIENT)
Dept: VASCULAR LAB | Facility: MEDICAL CENTER | Age: 67
End: 2017-10-11

## 2017-10-11 DIAGNOSIS — Z95.3 HISTORY OF HEART VALVE REPLACEMENT WITH BIOPROSTHETIC VALVE: ICD-10-CM

## 2017-10-11 NOTE — PROGRESS NOTES
Discharged from [facility].  Anticoagulation discontinued by melinda Goncalves, MikeD         last six months

## 2017-10-18 ENCOUNTER — APPOINTMENT (RX ONLY)
Dept: URBAN - METROPOLITAN AREA CLINIC 4 | Facility: CLINIC | Age: 67
Setting detail: DERMATOLOGY
End: 2017-10-18

## 2017-10-18 PROBLEM — I10 ESSENTIAL (PRIMARY) HYPERTENSION: Status: ACTIVE | Noted: 2017-10-18

## 2017-10-18 PROBLEM — Z85.46 PERSONAL HISTORY OF MALIGNANT NEOPLASM OF PROSTATE: Status: ACTIVE | Noted: 2017-10-18

## 2017-10-18 PROBLEM — Z85.828 PERSONAL HISTORY OF OTHER MALIGNANT NEOPLASM OF SKIN: Status: ACTIVE | Noted: 2017-10-18

## 2017-10-18 PROBLEM — C44.319 BASAL CELL CARCINOMA OF SKIN OF OTHER PARTS OF FACE: Status: ACTIVE | Noted: 2017-10-18

## 2017-10-18 PROCEDURE — ? EXCISION

## 2017-10-18 PROCEDURE — 11641 EXC F/E/E/N/L MAL+MRG 0.6-1: CPT

## 2017-10-18 PROCEDURE — 12051 INTMD RPR FACE/MM 2.5 CM/<: CPT

## 2017-10-18 NOTE — HPI: PROCEDURE (SKIN SURGERY)
Has The Growth Been Previously Biopsied?: has been previously biopsied
Additional History: Slide #[id number]
Body Location Override (Optional): Right preauricular

## 2017-10-18 NOTE — PROCEDURE: EXCISION
Patient Will Remove Sutures At Home?: No
Complex Repair And Split-Thickness Skin Graft Text: The defect edges were debeveled with a #15 scalpel blade.  The primary defect was closed partially with a complex linear closure.  Given the location of the defect, shape of the defect and the proximity to free margins a split thickness skin graft was deemed most appropriate to repair the remaining defect.  The graft was trimmed to fit the size of the remaining defect.  The graft was then placed in the primary defect, oriented appropriately, and sutured into place.
Saucerization Excision Additional Text (Leave Blank If You Do Not Want): The margin was drawn around the clinically apparent lesion.  Incisions were then made along these lines, in a tangential fashion, to the appropriate tissue plane and the lesion was extirpated.
Island Pedicle Flap Text: The defect edges were debeveled with a #15 scalpel blade.  Given the location of the defect, shape of the defect and the proximity to free margins an island pedicle advancement flap was deemed most appropriate.  Using a sterile surgical marker, an appropriate advancement flap was drawn incorporating the defect, outlining the appropriate donor tissue and placing the expected incisions within the relaxed skin tension lines where possible.    The area thus outlined was incised deep to adipose tissue with a #15 scalpel blade.  The skin margins were undermined to an appropriate distance in all directions around the primary defect and laterally outward around the island pedicle utilizing iris scissors.  There was minimal undermining beneath the pedicle flap.
Suture Removal: 7 days
Hemostasis: Electrocautery
Intermediate Repair Preamble Text (Leave Blank If You Do Not Want): Undermining was performed with blunt dissection.
Show Curettage Variables: Yes
Wound Care: Vaseline
O-L Flap Text: The defect edges were debeveled with a #15 scalpel blade.  Given the location of the defect, shape of the defect and the proximity to free margins an O-L flap was deemed most appropriate.  Using a sterile surgical marker, an appropriate advancement flap was drawn incorporating the defect and placing the expected incisions within the relaxed skin tension lines where possible.    The area thus outlined was incised deep to adipose tissue with a #15 scalpel blade.  The skin margins were undermined to an appropriate distance in all directions utilizing iris scissors.
Complex Repair And Double Advancement Flap Text: The defect edges were debeveled with a #15 scalpel blade.  The primary defect was closed partially with a complex linear closure.  Given the location of the remaining defect, shape of the defect and the proximity to free margins a double advancement flap was deemed most appropriate for complete closure of the defect.  Using a sterile surgical marker, an appropriate advancement flap was drawn incorporating the defect and placing the expected incisions within the relaxed skin tension lines where possible.    The area thus outlined was incised deep to adipose tissue with a #15 scalpel blade.  The skin margins were undermined to an appropriate distance in all directions utilizing iris scissors.
Complex Repair And Transposition Flap Text: The defect edges were debeveled with a #15 scalpel blade.  The primary defect was closed partially with a complex linear closure.  Given the location of the remaining defect, shape of the defect and the proximity to free margins a transposition flap was deemed most appropriate for complete closure of the defect.  Using a sterile surgical marker, an appropriate advancement flap was drawn incorporating the defect and placing the expected incisions within the relaxed skin tension lines where possible.    The area thus outlined was incised deep to adipose tissue with a #15 scalpel blade.  The skin margins were undermined to an appropriate distance in all directions utilizing iris scissors.
Complex Repair And Rhombic Flap Text: The defect edges were debeveled with a #15 scalpel blade.  The primary defect was closed partially with a complex linear closure.  Given the location of the remaining defect, shape of the defect and the proximity to free margins a rhombic flap was deemed most appropriate for complete closure of the defect.  Using a sterile surgical marker, an appropriate advancement flap was drawn incorporating the defect and placing the expected incisions within the relaxed skin tension lines where possible.    The area thus outlined was incised deep to adipose tissue with a #15 scalpel blade.  The skin margins were undermined to an appropriate distance in all directions utilizing iris scissors.
Bi-Rhombic Flap Text: The defect edges were debeveled with a #15 scalpel blade.  Given the location of the defect and the proximity to free margins a bi-rhombic flap was deemed most appropriate.  Using a sterile surgical marker, an appropriate rhombic flap was drawn incorporating the defect. The area thus outlined was incised deep to adipose tissue with a #15 scalpel blade.  The skin margins were undermined to an appropriate distance in all directions utilizing iris scissors.
Complex Repair Preamble Text (Leave Blank If You Do Not Want): Extensive wide undermining was performed.
Excision Depth: adipose tissue
V-Y Plasty Text: The defect edges were debeveled with a #15 scalpel blade.  Given the location of the defect, shape of the defect and the proximity to free margins an V-Y advancement flap was deemed most appropriate.  Using a sterile surgical marker, an appropriate advancement flap was drawn incorporating the defect and placing the expected incisions within the relaxed skin tension lines where possible.    The area thus outlined was incised deep to adipose tissue with a #15 scalpel blade.  The skin margins were undermined to an appropriate distance in all directions utilizing iris scissors.
Second Skin Substitute Units (Will Override Primary Defect Units If Greater Than 0): 0
Advancement Flap (Double) Text: The defect edges were debeveled with a #15 scalpel blade.  Given the location of the defect and the proximity to free margins a double advancement flap was deemed most appropriate.  Using a sterile surgical marker, the appropriate advancement flaps were drawn incorporating the defect and placing the expected incisions within the relaxed skin tension lines where possible.    The area thus outlined was incised deep to adipose tissue with a #15 scalpel blade.  The skin margins were undermined to an appropriate distance in all directions utilizing iris scissors.
Alar Island Pedicle Flap Text: The defect edges were debeveled with a #15 scalpel blade.  Given the location of the defect, shape of the defect and the proximity to the alar rim an island pedicle advancement flap was deemed most appropriate.  Using a sterile surgical marker, an appropriate advancement flap was drawn incorporating the defect, outlining the appropriate donor tissue and placing the expected incisions within the nasal ala running parallel to the alar rim. The area thus outlined was incised with a #15 scalpel blade.  The skin margins were undermined minimally to an appropriate distance in all directions around the primary defect and laterally outward around the island pedicle utilizing iris scissors.  There was minimal undermining beneath the pedicle flap.
Epidermal Closure: running cuticular
Epidermal Sutures: 6-0 Ethilon
Complex Repair And Ftsg Text: The defect edges were debeveled with a #15 scalpel blade.  The primary defect was closed partially with a complex linear closure.  Given the location of the defect, shape of the defect and the proximity to free margins a full thickness skin graft was deemed most appropriate to repair the remaining defect.  The graft was trimmed to fit the size of the remaining defect.  The graft was then placed in the primary defect, oriented appropriately, and sutured into place.
Complex Repair And O-L Flap Text: The defect edges were debeveled with a #15 scalpel blade.  The primary defect was closed partially with a complex linear closure.  Given the location of the remaining defect, shape of the defect and the proximity to free margins an O-L flap was deemed most appropriate for complete closure of the defect.  Using a sterile surgical marker, an appropriate flap was drawn incorporating the defect and placing the expected incisions within the relaxed skin tension lines where possible.    The area thus outlined was incised deep to adipose tissue with a #15 scalpel blade.  The skin margins were undermined to an appropriate distance in all directions utilizing iris scissors.
Graft Donor Site Bandage (Optional-Leave Blank If You Don't Want In Note): Steri-strips and a pressure bandage were applied to the donor site.
Anesthesia Type: 1% lidocaine with 1:100,000 epinephrine and 408mcg clindamycin/ml and a 1:10 solution of 8.4% sodium bicarbonate
O-T Plasty Text: The defect edges were debeveled with a #15 scalpel blade.  Given the location of the defect, shape of the defect and the proximity to free margins an O-T plasty was deemed most appropriate.  Using a sterile surgical marker, an appropriate O-T plasty was drawn incorporating the defect and placing the expected incisions within the relaxed skin tension lines where possible.    The area thus outlined was incised deep to adipose tissue with a #15 scalpel blade.  The skin margins were undermined to an appropriate distance in all directions utilizing iris scissors.
Keystone Flap Text: The defect edges were debeveled with a #15 scalpel blade.  Given the location of the defect, shape of the defect a keystone flap was deemed most appropriate.  Using a sterile surgical marker, an appropriate keystone flap was drawn incorporating the defect, outlining the appropriate donor tissue and placing the expected incisions within the relaxed skin tension lines where possible. The area thus outlined was incised deep to adipose tissue with a #15 scalpel blade.  The skin margins were undermined to an appropriate distance in all directions around the primary defect and laterally outward around the flap utilizing iris scissors.
Complex Repair And Rotation Flap Text: The defect edges were debeveled with a #15 scalpel blade.  The primary defect was closed partially with a complex linear closure.  Given the location of the remaining defect, shape of the defect and the proximity to free margins a rotation flap was deemed most appropriate for complete closure of the defect.  Using a sterile surgical marker, an appropriate advancement flap was drawn incorporating the defect and placing the expected incisions within the relaxed skin tension lines where possible.    The area thus outlined was incised deep to adipose tissue with a #15 scalpel blade.  The skin margins were undermined to an appropriate distance in all directions utilizing iris scissors.
Complex Repair And O-T Advancement Flap Text: The defect edges were debeveled with a #15 scalpel blade.  The primary defect was closed partially with a complex linear closure.  Given the location of the remaining defect, shape of the defect and the proximity to free margins an O-T advancement flap was deemed most appropriate for complete closure of the defect.  Using a sterile surgical marker, an appropriate advancement flap was drawn incorporating the defect and placing the expected incisions within the relaxed skin tension lines where possible.    The area thus outlined was incised deep to adipose tissue with a #15 scalpel blade.  The skin margins were undermined to an appropriate distance in all directions utilizing iris scissors.
Dressing: pressure dressing
H Plasty Text: Given the location of the defect, shape of the defect and the proximity to free margins a H-plasty was deemed most appropriate for repair.  Using a sterile surgical marker, the appropriate advancement arms of the H-plasty were drawn incorporating the defect and placing the expected incisions within the relaxed skin tension lines where possible. The area thus outlined was incised deep to adipose tissue with a #15 scalpel blade. The skin margins were undermined to an appropriate distance in all directions utilizing iris scissors.  The opposing advancement arms were then advanced into place in opposite direction and anchored with interrupted buried subcutaneous sutures.
Complex Repair And Single Advancement Flap Text: The defect edges were debeveled with a #15 scalpel blade.  The primary defect was closed partially with a complex linear closure.  Given the location of the remaining defect, shape of the defect and the proximity to free margins a single advancement flap was deemed most appropriate for complete closure of the defect.  Using a sterile surgical marker, an appropriate advancement flap was drawn incorporating the defect and placing the expected incisions within the relaxed skin tension lines where possible.    The area thus outlined was incised deep to adipose tissue with a #15 scalpel blade.  The skin margins were undermined to an appropriate distance in all directions utilizing iris scissors.
Advancement-Rotation Flap Text: The defect edges were debeveled with a #15 scalpel blade.  Given the location of the defect, shape of the defect and the proximity to free margins an advancement-rotation flap was deemed most appropriate.  Using a sterile surgical marker, an appropriate flap was drawn incorporating the defect and placing the expected incisions within the relaxed skin tension lines where possible. The area thus outlined was incised deep to adipose tissue with a #15 scalpel blade.  The skin margins were undermined to an appropriate distance in all directions utilizing iris scissors.
Paramedian Forehead Flap Text: A decision was made to reconstruct the defect utilizing an interpolation axial flap and a staged reconstruction.  A telfa template was made of the defect.  This telfa template was then used to outline the paramedian forehead pedicle flap.  The donor area for the pedicle flap was then injected with anesthesia.  The flap was excised through the skin and subcutaneous tissue down to the layer of the underlying musculature.  The pedicle flap was carefully excised within this deep plane to maintain its blood supply.  The edges of the donor site were undermined.   The donor site was closed in a primary fashion.  The pedicle was then rotated into position and sutured.  Once the tube was sutured into place, adequate blood supply was confirmed with blanching and refill.  The pedicle was then wrapped with xeroform gauze and dressed appropriately with a telfa and gauze bandage to ensure continued blood supply and protect the attached pedicle.
Helical Rim Advancement Flap Text: The defect edges were debeveled with a #15 blade scalpel.  Given the location of the defect and the proximity to free margins (helical rim) a double helical rim advancement flap was deemed most appropriate.  Using a sterile surgical marker, the appropriate advancement flaps were drawn incorporating the defect and placing the expected incisions between the helical rim and antihelix where possible.  The area thus outlined was incised through and through with a #15 scalpel blade.  With a skin hook and iris scissors, the flaps were gently and sharply undermined and freed up.
Mastoid Interpolation Flap Text: A decision was made to reconstruct the defect utilizing an interpolation axial flap and a staged reconstruction.  A telfa template was made of the defect.  This telfa template was then used to outline the mastoid interpolation flap.  The donor area for the pedicle flap was then injected with anesthesia.  The flap was excised through the skin and subcutaneous tissue down to the layer of the underlying musculature.  The pedicle flap was carefully excised within this deep plane to maintain its blood supply.  The edges of the donor site were undermined.   The donor site was closed in a primary fashion.  The pedicle was then rotated into position and sutured.  Once the tube was sutured into place, adequate blood supply was confirmed with blanching and refill.  The pedicle was then wrapped with xeroform gauze and dressed appropriately with a telfa and gauze bandage to ensure continued blood supply and protect the attached pedicle.
Epidermal Autograft Text: The defect edges were debeveled with a #15 scalpel blade.  Given the location of the defect, shape of the defect and the proximity to free margins an epidermal autograft was deemed most appropriate.  Using a sterile surgical marker, the primary defect shape was transferred to the donor site. The epidermal graft was then harvested.  The skin graft was then placed in the primary defect and oriented appropriately.
Complex Repair And Skin Substitute Graft Text: The defect edges were debeveled with a #15 scalpel blade.  The primary defect was closed partially with a complex linear closure.  Given the location of the remaining defect, shape of the defect and the proximity to free margins a skin substitute graft was deemed most appropriate to repair the remaining defect.  The graft was trimmed to fit the size of the remaining defect.  The graft was then placed in the primary defect, oriented appropriately, and sutured into place.
Post-Care Instructions: I reviewed with the patient in detail post-care instructions. Patient is not to engage in any heavy lifting, exercise, or swimming for the next 14 days. Should the patient develop any fevers, chills, bleeding, severe pain patient will contact the office immediately.
Island Pedicle Flap With Canthal Suspension Text: The defect edges were debeveled with a #15 scalpel blade.  Given the location of the defect, shape of the defect and the proximity to free margins an island pedicle advancement flap was deemed most appropriate.  Using a sterile surgical marker, an appropriate advancement flap was drawn incorporating the defect, outlining the appropriate donor tissue and placing the expected incisions within the relaxed skin tension lines where possible. The area thus outlined was incised deep to adipose tissue with a #15 scalpel blade.  The skin margins were undermined to an appropriate distance in all directions around the primary defect and laterally outward around the island pedicle utilizing iris scissors.  There was minimal undermining beneath the pedicle flap. A suspension suture was placed in the canthal tendon to prevent tension and prevent ectropion.
Skin Substitute Text: The defect edges were debeveled with a #15 scalpel blade.  Given the location of the defect, shape of the defect and the proximity to free margins a skin substitute graft was deemed most appropriate.  The graft material was trimmed to fit the size of the defect. The graft was then placed in the primary defect and oriented appropriately.
Complex Repair And Melolabial Flap Text: The defect edges were debeveled with a #15 scalpel blade.  The primary defect was closed partially with a complex linear closure.  Given the location of the remaining defect, shape of the defect and the proximity to free margins a melolabial flap was deemed most appropriate for complete closure of the defect.  Using a sterile surgical marker, an appropriate advancement flap was drawn incorporating the defect and placing the expected incisions within the relaxed skin tension lines where possible.    The area thus outlined was incised deep to adipose tissue with a #15 scalpel blade.  The skin margins were undermined to an appropriate distance in all directions utilizing iris scissors.
O-T Advancement Flap Text: The defect edges were debeveled with a #15 scalpel blade.  Given the location of the defect, shape of the defect and the proximity to free margins an O-T advancement flap was deemed most appropriate.  Using a sterile surgical marker, an appropriate advancement flap was drawn incorporating the defect and placing the expected incisions within the relaxed skin tension lines where possible.    The area thus outlined was incised deep to adipose tissue with a #15 scalpel blade.  The skin margins were undermined to an appropriate distance in all directions utilizing iris scissors.
Cheek-To-Nose Interpolation Flap Text: A decision was made to reconstruct the defect utilizing an interpolation axial flap and a staged reconstruction.  A telfa template was made of the defect.  This telfa template was then used to outline the Cheek-To-Nose Interpolation flap.  The donor area for the pedicle flap was then injected with anesthesia.  The flap was excised through the skin and subcutaneous tissue down to the layer of the underlying musculature.  The interpolation flap was carefully excised within this deep plane to maintain its blood supply.  The edges of the donor site were undermined.   The donor site was closed in a primary fashion.  The pedicle was then rotated into position and sutured.  Once the tube was sutured into place, adequate blood supply was confirmed with blanching and refill.  The pedicle was then wrapped with xeroform gauze and dressed appropriately with a telfa and gauze bandage to ensure continued blood supply and protect the attached pedicle.
Epidermal Closure Graft Donor Site (Optional): simple interrupted
Complex Repair And V-Y Plasty Text: The defect edges were debeveled with a #15 scalpel blade.  The primary defect was closed partially with a complex linear closure.  Given the location of the remaining defect, shape of the defect and the proximity to free margins a V-Y plasty was deemed most appropriate for complete closure of the defect.  Using a sterile surgical marker, an appropriate advancement flap was drawn incorporating the defect and placing the expected incisions within the relaxed skin tension lines where possible.    The area thus outlined was incised deep to adipose tissue with a #15 scalpel blade.  The skin margins were undermined to an appropriate distance in all directions utilizing iris scissors.
Purse String (Intermediate) Text: Given the location of the defect and the characteristics of the surrounding skin a purse string intermediate closure was deemed most appropriate.  Undermining was performed circumfirentially around the surgical defect.  A purse string suture was then placed and tightened.
Dorsal Nasal Flap Text: The defect edges were debeveled with a #15 scalpel blade.  Given the location of the defect and the proximity to free margins a dorsal nasal flap was deemed most appropriate.  Using a sterile surgical marker, an appropriate dorsal nasal flap was drawn around the defect.    The area thus outlined was incised deep to adipose tissue with a #15 scalpel blade.  The skin margins were undermined to an appropriate distance in all directions utilizing iris scissors.
Curvilinear Excision Additional Text (Leave Blank If You Do Not Want): The margin was drawn around the clinically apparent lesion.  A curvilinear shape was then drawn on the skin incorporating the lesion and margins.  Incisions were then made along these lines to the appropriate tissue plane and the lesion was extirpated.
Double Island Pedicle Flap Text: The defect edges were debeveled with a #15 scalpel blade.  Given the location of the defect, shape of the defect and the proximity to free margins a double island pedicle advancement flap was deemed most appropriate.  Using a sterile surgical marker, an appropriate advancement flap was drawn incorporating the defect, outlining the appropriate donor tissue and placing the expected incisions within the relaxed skin tension lines where possible.    The area thus outlined was incised deep to adipose tissue with a #15 scalpel blade.  The skin margins were undermined to an appropriate distance in all directions around the primary defect and laterally outward around the island pedicle utilizing iris scissors.  There was minimal undermining beneath the pedicle flap.
Estimated Blood Loss (Cc): minimal
No Repair - Repaired With Adjacent Surgical Defect Text (Leave Blank If You Do Not Want): After the excision the defect was repaired concurrently with another surgical defect which was in close approximation.
Positioning (Leave Blank If You Do Not Want): The patient was placed in a comfortable position exposing the surgical site.
Pre-Excision Curettage Text (Leave Blank If You Do Not Want): Prior to drawing the surgical margin the visible lesion was removed with electrodesiccation and curettage to clearly define the lesion size.
Anesthesia Volume In Cc: 6
W Plasty Text: The lesion was extirpated to the level of the fat with a #15 scalpel blade.  Given the location of the defect, shape of the defect and the proximity to free margins a W-plasty was deemed most appropriate for repair.  Using a sterile surgical marker, the appropriate transposition arms of the W-plasty were drawn incorporating the defect and placing the expected incisions within the relaxed skin tension lines where possible.    The area thus outlined was incised deep to adipose tissue with a #15 scalpel blade.  The skin margins were undermined to an appropriate distance in all directions utilizing iris scissors.  The opposing transposition arms were then transposed into place in opposite direction and anchored with interrupted buried subcutaneous sutures.
Partial Purse String (Simple) Text: Given the location of the defect and the characteristics of the surrounding skin a simple purse string closure was deemed most appropriate.  Undermining was performed circumferentially around the surgical defect.  A purse string suture was then placed and tightened. Wound tension of the circular defect prevented complete closure of the wound.
Cheek Interpolation Flap Text: A decision was made to reconstruct the defect utilizing an interpolation axial flap and a staged reconstruction.  A telfa template was made of the defect.  This telfa template was then used to outline the Cheek Interpolation flap.  The donor area for the pedicle flap was then injected with anesthesia.  The flap was excised through the skin and subcutaneous tissue down to the layer of the underlying musculature.  The interpolation flap was carefully excised within this deep plane to maintain its blood supply.  The edges of the donor site were undermined.   The donor site was closed in a primary fashion.  The pedicle was then rotated into position and sutured.  Once the tube was sutured into place, adequate blood supply was confirmed with blanching and refill.  The pedicle was then wrapped with xeroform gauze and dressed appropriately with a telfa and gauze bandage to ensure continued blood supply and protect the attached pedicle.
Island Pedicle Flap-Requiring Vessel Identification Text: The defect edges were debeveled with a #15 scalpel blade.  Given the location of the defect, shape of the defect and the proximity to free margins an island pedicle advancement flap was deemed most appropriate.  Using a sterile surgical marker, an appropriate advancement flap was drawn, based on the axial vessel mentioned above, incorporating the defect, outlining the appropriate donor tissue and placing the expected incisions within the relaxed skin tension lines where possible.    The area thus outlined was incised deep to adipose tissue with a #15 scalpel blade.  The skin margins were undermined to an appropriate distance in all directions around the primary defect and laterally outward around the island pedicle utilizing iris scissors.  There was minimal undermining beneath the pedicle flap.
Complex Repair And Bilobe Flap Text: The defect edges were debeveled with a #15 scalpel blade.  The primary defect was closed partially with a complex linear closure.  Given the location of the remaining defect, shape of the defect and the proximity to free margins a bilobe flap was deemed most appropriate for complete closure of the defect.  Using a sterile surgical marker, an appropriate advancement flap was drawn incorporating the defect and placing the expected incisions within the relaxed skin tension lines where possible.    The area thus outlined was incised deep to adipose tissue with a #15 scalpel blade.  The skin margins were undermined to an appropriate distance in all directions utilizing iris scissors.
Rhombic Flap Text: The defect edges were debeveled with a #15 scalpel blade.  Given the location of the defect and the proximity to free margins a rhombic flap was deemed most appropriate.  Using a sterile surgical marker, an appropriate rhombic flap was drawn incorporating the defect.    The area thus outlined was incised deep to adipose tissue with a #15 scalpel blade.  The skin margins were undermined to an appropriate distance in all directions utilizing iris scissors.
Ear Star Wedge Flap Text: The defect edges were debeveled with a #15 blade scalpel.  Given the location of the defect and the proximity to free margins (helical rim) an ear star wedge flap was deemed most appropriate.  Using a sterile surgical marker, the appropriate flap was drawn incorporating the defect and placing the expected incisions between the helical rim and antihelix where possible.  The area thus outlined was incised through and through with a #15 scalpel blade.
Purse String (Simple) Text: Given the location of the defect and the characteristics of the surrounding skin a purse string simple closure was deemed most appropriate.  Undermining was performed circumferentially around the surgical defect.  A purse string suture was then placed and tightened.
Elliptical Excision Additional Text (Leave Blank If You Do Not Want): The margin was drawn around the clinically apparent lesion.  An elliptical shape was then drawn on the skin incorporating the lesion and margins.  Incisions were then made along these lines to the appropriate tissue plane and the lesion was extirpated.
Complex Repair And Epidermal Autograft Text: The defect edges were debeveled with a #15 scalpel blade.  The primary defect was closed partially with a complex linear closure.  Given the location of the defect, shape of the defect and the proximity to free margins an epidermal autograft was deemed most appropriate to repair the remaining defect.  The graft was trimmed to fit the size of the remaining defect.  The graft was then placed in the primary defect, oriented appropriately, and sutured into place.
Fusiform Excision Additional Text (Leave Blank If You Do Not Want): The margin was drawn around the clinically apparent lesion.  A fusiform shape was then drawn on the skin incorporating the lesion and margins.  Incisions were then made along these lines to the appropriate tissue plane and the lesion was extirpated.
Deep Sutures: 5-0 Vicryl
Repair Performed By Another Provider Text (Leave Blank If You Do Not Want): After the tissue was excised the defect was repaired by another provider.
Posterior Auricular Interpolation Flap Text: A decision was made to reconstruct the defect utilizing an interpolation axial flap and a staged reconstruction.  A telfa template was made of the defect.  This telfa template was then used to outline the posterior auricular interpolation flap.  The donor area for the pedicle flap was then injected with anesthesia.  The flap was excised through the skin and subcutaneous tissue down to the layer of the underlying musculature.  The pedicle flap was carefully excised within this deep plane to maintain its blood supply.  The edges of the donor site were undermined.   The donor site was closed in a primary fashion.  The pedicle was then rotated into position and sutured.  Once the tube was sutured into place, adequate blood supply was confirmed with blanching and refill.  The pedicle was then wrapped with xeroform gauze and dressed appropriately with a telfa and gauze bandage to ensure continued blood supply and protect the attached pedicle.
Dermal Autograft Text: The defect edges were debeveled with a #15 scalpel blade.  Given the location of the defect, shape of the defect and the proximity to free margins a dermal autograft was deemed most appropriate.  Using a sterile surgical marker, the primary defect shape was transferred to the donor site. The area thus outlined was incised deep to adipose tissue with a #15 scalpel blade.  The harvested graft was then trimmed of adipose and epidermal tissue until only dermis was left.  The skin graft was then placed in the primary defect and oriented appropriately.
Modified Advancement Flap Text: The defect edges were debeveled with a #15 scalpel blade.  Given the location of the defect, shape of the defect and the proximity to free margins a modified advancement flap was deemed most appropriate.  Using a sterile surgical marker, an appropriate advancement flap was drawn incorporating the defect and placing the expected incisions within the relaxed skin tension lines where possible.    The area thus outlined was incised deep to adipose tissue with a #15 scalpel blade.  The skin margins were undermined to an appropriate distance in all directions utilizing iris scissors.
Complex Repair And Z Plasty Text: The defect edges were debeveled with a #15 scalpel blade.  The primary defect was closed partially with a complex linear closure.  Given the location of the remaining defect, shape of the defect and the proximity to free margins a Z plasty was deemed most appropriate for complete closure of the defect.  Using a sterile surgical marker, an appropriate advancement flap was drawn incorporating the defect and placing the expected incisions within the relaxed skin tension lines where possible.    The area thus outlined was incised deep to adipose tissue with a #15 scalpel blade.  The skin margins were undermined to an appropriate distance in all directions utilizing iris scissors.
Advancement Flap (Single) Text: The defect edges were debeveled with a #15 scalpel blade.  Given the location of the defect and the proximity to free margins a single advancement flap was deemed most appropriate.  Using a sterile surgical marker, an appropriate advancement flap was drawn incorporating the defect and placing the expected incisions within the relaxed skin tension lines where possible.    The area thus outlined was incised deep to adipose tissue with a #15 scalpel blade.  The skin margins were undermined to an appropriate distance in all directions utilizing iris scissors.
Consent was obtained from the patient. The risks and benefits to therapy were discussed in detail. Specifically, the risks of infection, scarring, bleeding, prolonged wound healing, incomplete removal, allergy to anesthesia, nerve injury and recurrence were addressed. Prior to the procedure, the treatment site was clearly identified and confirmed by the patient. All components of Universal Protocol/PAUSE Rule completed.
Scalpel Size: 15 blade
Spiral Flap Text: The defect edges were debeveled with a #15 scalpel blade.  Given the location of the defect, shape of the defect and the proximity to free margins a spiral flap was deemed most appropriate.  Using a sterile surgical marker, an appropriate rotation flap was drawn incorporating the defect and placing the expected incisions within the relaxed skin tension lines where possible. The area thus outlined was incised deep to adipose tissue with a #15 scalpel blade.  The skin margins were undermined to an appropriate distance in all directions utilizing iris scissors.
A-T Advancement Flap Text: The defect edges were debeveled with a #15 scalpel blade.  Given the location of the defect, shape of the defect and the proximity to free margins an A-T advancement flap was deemed most appropriate.  Using a sterile surgical marker, an appropriate advancement flap was drawn incorporating the defect and placing the expected incisions within the relaxed skin tension lines where possible.    The area thus outlined was incised deep to adipose tissue with a #15 scalpel blade.  The skin margins were undermined to an appropriate distance in all directions utilizing iris scissors.
Interpolation Flap Text: A decision was made to reconstruct the defect utilizing an interpolation axial flap and a staged reconstruction.  A telfa template was made of the defect.  This telfa template was then used to outline the interpolation flap.  The donor area for the pedicle flap was then injected with anesthesia.  The flap was excised through the skin and subcutaneous tissue down to the layer of the underlying musculature.  The interpolation flap was carefully excised within this deep plane to maintain its blood supply.  The edges of the donor site were undermined.   The donor site was closed in a primary fashion.  The pedicle was then rotated into position and sutured.  Once the tube was sutured into place, adequate blood supply was confirmed with blanching and refill.  The pedicle was then wrapped with xeroform gauze and dressed appropriately with a telfa and gauze bandage to ensure continued blood supply and protect the attached pedicle.
Path Notes (To The Dermatopathologist): Please check margins.
Burow's Advancement Flap Text: The defect edges were debeveled with a #15 scalpel blade.  Given the location of the defect and the proximity to free margins a Burow's advancement flap was deemed most appropriate.  Using a sterile surgical marker, the appropriate advancement flap was drawn incorporating the defect and placing the expected incisions within the relaxed skin tension lines where possible.    The area thus outlined was incised deep to adipose tissue with a #15 scalpel blade.  The skin margins were undermined to an appropriate distance in all directions utilizing iris scissors.
Excisional Biopsy Additional Text (Leave Blank If You Do Not Want): The margin was drawn around the clinically apparent lesion. An elliptical shape was then drawn on the skin incorporating the lesion and margins.  Incisions were then made along these lines to the appropriate tissue plane and the lesion was extirpated.
X Size Of Lesion In Cm (Optional): 0.4
Lab Facility: 
Surgeon Performing The Repair (Optional): Jose G
Complex Repair And W Plasty Text: The defect edges were debeveled with a #15 scalpel blade.  The primary defect was closed partially with a complex linear closure.  Given the location of the remaining defect, shape of the defect and the proximity to free margins a W plasty was deemed most appropriate for complete closure of the defect.  Using a sterile surgical marker, an appropriate advancement flap was drawn incorporating the defect and placing the expected incisions within the relaxed skin tension lines where possible.    The area thus outlined was incised deep to adipose tissue with a #15 scalpel blade.  The skin margins were undermined to an appropriate distance in all directions utilizing iris scissors.
Complex Repair And Double M Plasty Text: The defect edges were debeveled with a #15 scalpel blade.  The primary defect was closed partially with a complex linear closure.  Given the location of the remaining defect, shape of the defect and the proximity to free margins a double M plasty was deemed most appropriate for complete closure of the defect.  Using a sterile surgical marker, an appropriate advancement flap was drawn incorporating the defect and placing the expected incisions within the relaxed skin tension lines where possible.    The area thus outlined was incised deep to adipose tissue with a #15 scalpel blade.  The skin margins were undermined to an appropriate distance in all directions utilizing iris scissors.
Split-Thickness Skin Graft Text: The defect edges were debeveled with a #15 scalpel blade.  Given the location of the defect, shape of the defect and the proximity to free margins a split thickness skin graft was deemed most appropriate.  Using a sterile surgical marker, the primary defect shape was transferred to the donor site. The split thickness graft was then harvested.  The skin graft was then placed in the primary defect and oriented appropriately.
Bilobed Flap Text: The defect edges were debeveled with a #15 scalpel blade.  Given the location of the defect and the proximity to free margins a bilobe flap was deemed most appropriate.  Using a sterile surgical marker, an appropriate bilobe flap drawn around the defect.    The area thus outlined was incised deep to adipose tissue with a #15 scalpel blade.  The skin margins were undermined to an appropriate distance in all directions utilizing iris scissors.
Bilobed Transposition Flap Text: The defect edges were debeveled with a #15 scalpel blade.  Given the location of the defect and the proximity to free margins a bilobed transposition flap was deemed most appropriate.  Using a sterile surgical marker, an appropriate bilobe flap drawn around the defect.    The area thus outlined was incised deep to adipose tissue with a #15 scalpel blade.  The skin margins were undermined to an appropriate distance in all directions utilizing iris scissors.
Number Of Deep Sutures (Optional): 2
Crescentic Advancement Flap Text: The defect edges were debeveled with a #15 scalpel blade.  Given the location of the defect and the proximity to free margins a crescentic advancement flap was deemed most appropriate.  Using a sterile surgical marker, the appropriate advancement flap was drawn incorporating the defect and placing the expected incisions within the relaxed skin tension lines where possible.    The area thus outlined was incised deep to adipose tissue with a #15 scalpel blade.  The skin margins were undermined to an appropriate distance in all directions utilizing iris scissors.
S Plasty Text: Given the location and shape of the defect, and the orientation of relaxed skin tension lines, an S-plasty was deemed most appropriate for repair.  Using a sterile surgical marker, the appropriate outline of the S-plasty was drawn, incorporating the defect and placing the expected incisions within the relaxed skin tension lines where possible.  The area thus outlined was incised deep to adipose tissue with a #15 scalpel blade.  The skin margins were undermined to an appropriate distance in all directions utilizing iris scissors. The skin flaps were advanced over the defect.  The opposing margins were then approximated with interrupted buried subcutaneous sutures.
Mucosal Advancement Flap Text: Given the location of the defect, shape of the defect and the proximity to free margins a mucosal advancement flap was deemed most appropriate. Incisions were made with a 15 blade scalpel in the appropriate fashion along the cutaneous vermilion border and the mucosal lip. The remaining actinically damaged mucosal tissue was excised.  The mucosal advancement flap was then elevated to the gingival sulcus with care taken to preserve the neurovascular structures and advanced into the primary defect. Care was taken to ensure that precise realignment of the vermilion border was achieved.
Intermediate / Complex Repair - Final Wound Length In Cm: 2.5
Billing Type: Third-Party Bill
Cartilage Graft Text: The defect edges were debeveled with a #15 scalpel blade.  Given the location of the defect, shape of the defect, the fact the defect involved a full thickness cartilage defect a cartilage graft was deemed most appropriate.  An appropriate donor site was identified, cleansed, and anesthetized. The cartilage graft was then harvested and transferred to the recipient site, oriented appropriately and then sutured into place.  The secondary defect was then repaired using a primary closure.
Detail Level: Detailed
Xenograft Text: The defect edges were debeveled with a #15 scalpel blade.  Given the location of the defect, shape of the defect and the proximity to free margins a xenograft was deemed most appropriate.  The graft was then trimmed to fit the size of the defect.  The graft was then placed in the primary defect and oriented appropriately.
Slit Excision Additional Text (Leave Blank If You Do Not Want): A linear line was drawn on the skin overlying the lesion. An incision was made slowly until the lesion was visualized.  Once visualized, the lesion was removed with blunt dissection.
Size Of Lesion In Cm: 0.3
Ftsg Text: The defect edges were debeveled with a #15 scalpel blade.  Given the location of the defect, shape of the defect and the proximity to free margins a full thickness skin graft was deemed most appropriate.  Using a sterile surgical marker, the primary defect shape was transferred to the donor site. The area thus outlined was incised deep to adipose tissue with a #15 scalpel blade.  The harvested graft was then trimmed of adipose tissue until only dermis and epidermis was left.  The skin margins of the secondary defect were undermined to an appropriate distance in all directions utilizing iris scissors.  The secondary defect was closed with interrupted buried subcutaneous sutures.  The skin edges were then re-apposed with running  sutures.  The skin graft was then placed in the primary defect and oriented appropriately.
Z Plasty Text: The lesion was extirpated to the level of the fat with a #15 scalpel blade.  Given the location of the defect, shape of the defect and the proximity to free margins a Z-plasty was deemed most appropriate for repair.  Using a sterile surgical marker, the appropriate transposition arms of the Z-plasty were drawn incorporating the defect and placing the expected incisions within the relaxed skin tension lines where possible.    The area thus outlined was incised deep to adipose tissue with a #15 scalpel blade.  The skin margins were undermined to an appropriate distance in all directions utilizing iris scissors.  The opposing transposition arms were then transposed into place in opposite direction and anchored with interrupted buried subcutaneous sutures.
Tissue Cultured Epidermal Autograft Text: The defect edges were debeveled with a #15 scalpel blade.  Given the location of the defect, shape of the defect and the proximity to free margins a tissue cultured epidermal autograft was deemed most appropriate.  The graft was then trimmed to fit the size of the defect.  The graft was then placed in the primary defect and oriented appropriately.
Partial Purse String (Intermediate) Text: Given the location of the defect and the characteristics of the surrounding skin an intermediate purse string closure was deemed most appropriate.  Undermining was performed circumferentially around the surgical defect.  A purse string suture was then placed and tightened. Wound tension of the circular defect prevented complete closure of the wound.
Previous Accession (Optional): [id number]
Melolabial Interpolation Flap Text: A decision was made to reconstruct the defect utilizing an interpolation axial flap and a staged reconstruction.  A telfa template was made of the defect.  This telfa template was then used to outline the melolabial interpolation flap.  The donor area for the pedicle flap was then injected with anesthesia.  The flap was excised through the skin and subcutaneous tissue down to the layer of the underlying musculature.  The pedicle flap was carefully excised within this deep plane to maintain its blood supply.  The edges of the donor site were undermined.   The donor site was closed in a primary fashion.  The pedicle was then rotated into position and sutured.  Once the tube was sutured into place, adequate blood supply was confirmed with blanching and refill.  The pedicle was then wrapped with xeroform gauze and dressed appropriately with a telfa and gauze bandage to ensure continued blood supply and protect the attached pedicle.
Body Location Override (Optional - Billing Will Still Be Based On Selected Body Map Location If Applicable): left preauricular
O-Z Plasty Text: The defect edges were debeveled with a #15 scalpel blade.  Given the location of the defect, shape of the defect and the proximity to free margins an O-Z plasty (double transposition flap) was deemed most appropriate.  Using a sterile surgical marker, the appropriate transposition flaps were drawn incorporating the defect and placing the expected incisions within the relaxed skin tension lines where possible.    The area thus outlined was incised deep to adipose tissue with a #15 scalpel blade.  The skin margins were undermined to an appropriate distance in all directions utilizing iris scissors.  Hemostasis was achieved with electrocautery.  The flaps were then transposed into place, one clockwise and the other counterclockwise, and anchored with interrupted buried subcutaneous sutures.
Trilobed Flap Text: The defect edges were debeveled with a #15 scalpel blade.  Given the location of the defect and the proximity to free margins a trilobed flap was deemed most appropriate.  Using a sterile surgical marker, an appropriate trilobed flap drawn around the defect.    The area thus outlined was incised deep to adipose tissue with a #15 scalpel blade.  The skin margins were undermined to an appropriate distance in all directions utilizing iris scissors.
Complex Repair And M Plasty Text: The defect edges were debeveled with a #15 scalpel blade.  The primary defect was closed partially with a complex linear closure.  Given the location of the remaining defect, shape of the defect and the proximity to free margins an M plasty was deemed most appropriate for complete closure of the defect.  Using a sterile surgical marker, an appropriate advancement flap was drawn incorporating the defect and placing the expected incisions within the relaxed skin tension lines where possible.    The area thus outlined was incised deep to adipose tissue with a #15 scalpel blade.  The skin margins were undermined to an appropriate distance in all directions utilizing iris scissors.
Complex Repair And Modified Advancement Flap Text: The defect edges were debeveled with a #15 scalpel blade.  The primary defect was closed partially with a complex linear closure.  Given the location of the remaining defect, shape of the defect and the proximity to free margins a modified advancement flap was deemed most appropriate for complete closure of the defect.  Using a sterile surgical marker, an appropriate advancement flap was drawn incorporating the defect and placing the expected incisions within the relaxed skin tension lines where possible.    The area thus outlined was incised deep to adipose tissue with a #15 scalpel blade.  The skin margins were undermined to an appropriate distance in all directions utilizing iris scissors.
Complex Repair And Tissue Cultured Epidermal Autograft Text: The defect edges were debeveled with a #15 scalpel blade.  The primary defect was closed partially with a complex linear closure.  Given the location of the defect, shape of the defect and the proximity to free margins an tissue cultured epidermal autograft was deemed most appropriate to repair the remaining defect.  The graft was trimmed to fit the size of the remaining defect.  The graft was then placed in the primary defect, oriented appropriately, and sutured into place.
Rotation Flap Text: The defect edges were debeveled with a #15 scalpel blade.  Given the location of the defect, shape of the defect and the proximity to free margins a rotation flap was deemed most appropriate.  Using a sterile surgical marker, an appropriate rotation flap was drawn incorporating the defect and placing the expected incisions within the relaxed skin tension lines where possible.    The area thus outlined was incised deep to adipose tissue with a #15 scalpel blade.  The skin margins were undermined to an appropriate distance in all directions utilizing iris scissors.
Excision Method: Fusiform
Complex Repair And A-T Advancement Flap Text: The defect edges were debeveled with a #15 scalpel blade.  The primary defect was closed partially with a complex linear closure.  Given the location of the remaining defect, shape of the defect and the proximity to free margins an A-T advancement flap was deemed most appropriate for complete closure of the defect.  Using a sterile surgical marker, an appropriate advancement flap was drawn incorporating the defect and placing the expected incisions within the relaxed skin tension lines where possible.    The area thus outlined was incised deep to adipose tissue with a #15 scalpel blade.  The skin margins were undermined to an appropriate distance in all directions utilizing iris scissors.
Muscle Hinge Flap Text: The defect edges were debeveled with a #15 scalpel blade.  Given the size, depth and location of the defect and the proximity to free margins a muscle hinge flap was deemed most appropriate.  Using a sterile surgical marker, an appropriate hinge flap was drawn incorporating the defect. The area thus outlined was incised with a #15 scalpel blade.  The skin margins were undermined to an appropriate distance in all directions utilizing iris scissors.
Star Wedge Flap Text: The defect edges were debeveled with a #15 scalpel blade.  Given the location of the defect, shape of the defect and the proximity to free margins a star wedge flap was deemed most appropriate.  Using a sterile surgical marker, an appropriate rotation flap was drawn incorporating the defect and placing the expected incisions within the relaxed skin tension lines where possible. The area thus outlined was incised deep to adipose tissue with a #15 scalpel blade.  The skin margins were undermined to an appropriate distance in all directions utilizing iris scissors.
Complex Repair And Dorsal Nasal Flap Text: The defect edges were debeveled with a #15 scalpel blade.  The primary defect was closed partially with a complex linear closure.  Given the location of the remaining defect, shape of the defect and the proximity to free margins a dorsal nasal flap was deemed most appropriate for complete closure of the defect.  Using a sterile surgical marker, an appropriate flap was drawn incorporating the defect and placing the expected incisions within the relaxed skin tension lines where possible.    The area thus outlined was incised deep to adipose tissue with a #15 scalpel blade.  The skin margins were undermined to an appropriate distance in all directions utilizing iris scissors.
Repair Type: Intermediate
Bilateral Helical Rim Advancement Flap Text: The defect edges were debeveled with a #15 blade scalpel.  Given the location of the defect and the proximity to free margins (helical rim) a bilateral helical rim advancement flap was deemed most appropriate.  Using a sterile surgical marker, the appropriate advancement flaps were drawn incorporating the defect and placing the expected incisions between the helical rim and antihelix where possible.  The area thus outlined was incised through and through with a #15 scalpel blade.  With a skin hook and iris scissors, the flaps were gently and sharply undermined and freed up.
Lip Wedge Excision Repair Text: Given the location of the defect and the proximity to free margins a full thickness wedge repair was deemed most appropriate.  Using a sterile surgical marker, the appropriate repair was drawn incorporating the defect and placing the expected incisions perpendicular to the vermilion border.  The vermilion border was also meticulously outlined to ensure appropriate reapproximation during the repair.  The area thus outlined was incised through and through with a #15 scalpel blade.  The muscularis and dermis were reaproximated with deep sutures following hemostasis. Care was taken to realign the vermilion border before proceeding with the superficial closure.  Once the vermilion was realigned the superfical and mucosal closure was finished.
Composite Graft Text: The defect edges were debeveled with a #15 scalpel blade.  Given the location of the defect, shape of the defect, the proximity to free margins and the fact the defect was full thickness a composite graft was deemed most appropriate.  The defect was outline and then transferred to the donor site.  A full thickness graft was then excised from the donor site. The graft was then placed in the primary defect, oriented appropriately and then sutured into place.  The secondary defect was then repaired using a primary closure.
Hatchet Flap Text: The defect edges were debeveled with a #15 scalpel blade.  Given the location of the defect, shape of the defect and the proximity to free margins a hatchet flap was deemed most appropriate.  Using a sterile surgical marker, an appropriate hatchet flap was drawn incorporating the defect and placing the expected incisions within the relaxed skin tension lines where possible.    The area thus outlined was incised deep to adipose tissue with a #15 scalpel blade.  The skin margins were undermined to an appropriate distance in all directions utilizing iris scissors.
Complex Repair And Xenograft Text: The defect edges were debeveled with a #15 scalpel blade.  The primary defect was closed partially with a complex linear closure.  Given the location of the defect, shape of the defect and the proximity to free margins a xenograft was deemed most appropriate to repair the remaining defect.  The graft was trimmed to fit the size of the remaining defect.  The graft was then placed in the primary defect, oriented appropriately, and sutured into place.
Lab: [id number]
V-Y Flap Text: The defect edges were debeveled with a #15 scalpel blade.  Given the location of the defect, shape of the defect and the proximity to free margins a V-Y flap was deemed most appropriate.  Using a sterile surgical marker, an appropriate advancement flap was drawn incorporating the defect and placing the expected incisions within the relaxed skin tension lines where possible.    The area thus outlined was incised deep to adipose tissue with a #15 scalpel blade.  The skin margins were undermined to an appropriate distance in all directions utilizing iris scissors.
Transposition Flap Text: The defect edges were debeveled with a #15 scalpel blade.  Given the location of the defect and the proximity to free margins a transposition flap was deemed most appropriate.  Using a sterile surgical marker, an appropriate transposition flap was drawn incorporating the defect.    The area thus outlined was incised deep to adipose tissue with a #15 scalpel blade.  The skin margins were undermined to an appropriate distance in all directions utilizing iris scissors.
Perilesional Excision Additional Text (Leave Blank If You Do Not Want): The margin was drawn around the clinically apparent lesion. Incisions were then made along these lines to the appropriate tissue plane and the lesion was extirpated.
Complex Repair And Dermal Autograft Text: The defect edges were debeveled with a #15 scalpel blade.  The primary defect was closed partially with a complex linear closure.  Given the location of the defect, shape of the defect and the proximity to free margins an dermal autograft was deemed most appropriate to repair the remaining defect.  The graft was trimmed to fit the size of the remaining defect.  The graft was then placed in the primary defect, oriented appropriately, and sutured into place.
Melolabial Transposition Flap Text: The defect edges were debeveled with a #15 scalpel blade.  Given the location of the defect and the proximity to free margins a melolabial flap was deemed most appropriate.  Using a sterile surgical marker, an appropriate melolabial transposition flap was drawn incorporating the defect.    The area thus outlined was incised deep to adipose tissue with a #15 scalpel blade.  The skin margins were undermined to an appropriate distance in all directions utilizing iris scissors.

## 2017-10-25 ENCOUNTER — APPOINTMENT (RX ONLY)
Dept: URBAN - METROPOLITAN AREA CLINIC 4 | Facility: CLINIC | Age: 67
Setting detail: DERMATOLOGY
End: 2017-10-25

## 2017-10-25 DIAGNOSIS — Z48.02 ENCOUNTER FOR REMOVAL OF SUTURES: ICD-10-CM

## 2017-10-25 PROCEDURE — ? SUTURE REMOVAL (GLOBAL PERIOD)

## 2017-10-25 ASSESSMENT — LOCATION DETAILED DESCRIPTION DERM: LOCATION DETAILED: LEFT MID PREAURICULAR CHEEK

## 2017-10-25 ASSESSMENT — LOCATION SIMPLE DESCRIPTION DERM: LOCATION SIMPLE: LEFT CHEEK

## 2017-10-25 ASSESSMENT — LOCATION ZONE DERM: LOCATION ZONE: FACE

## 2017-10-25 NOTE — PROCEDURE: SUTURE REMOVAL (GLOBAL PERIOD)
Detail Level: Simple
Body Location Override (Optional - Billing Will Still Be Based On Selected Body Map Location If Applicable): left preauricular
Add 53101 Cpt? (Important Note: In 2017 The Use Of 28616 Is Being Tracked By Cms To Determine Future Global Period Reimbursement For Global Periods): no

## 2017-11-08 DIAGNOSIS — E78.2 MIXED HYPERLIPIDEMIA: ICD-10-CM

## 2017-11-08 RX ORDER — ATORVASTATIN CALCIUM 80 MG/1
80 TABLET, FILM COATED ORAL EVERY EVENING
Qty: 30 TAB | Refills: 6 | Status: SHIPPED | OUTPATIENT
Start: 2017-11-08 | End: 2018-06-14 | Stop reason: SDUPTHER

## 2017-12-11 DIAGNOSIS — I10 ESSENTIAL HYPERTENSION, BENIGN: ICD-10-CM

## 2018-02-15 ENCOUNTER — TELEPHONE (OUTPATIENT)
Dept: VASCULAR LAB | Facility: MEDICAL CENTER | Age: 68
End: 2018-02-15

## 2018-02-15 DIAGNOSIS — Z95.3 HISTORY OF HEART VALVE REPLACEMENT WITH BIOPROSTHETIC VALVE: ICD-10-CM

## 2018-02-15 DIAGNOSIS — Z95.2 S/P AVR (AORTIC VALVE REPLACEMENT): ICD-10-CM

## 2018-02-15 DIAGNOSIS — Q25.1 COARCTATION OF AORTA: ICD-10-CM

## 2018-03-05 ENCOUNTER — TELEPHONE (OUTPATIENT)
Dept: MEDICAL GROUP | Age: 68
End: 2018-03-05

## 2018-03-05 ENCOUNTER — OFFICE VISIT (OUTPATIENT)
Dept: MEDICAL GROUP | Age: 68
End: 2018-03-05
Payer: MEDICARE

## 2018-03-05 VITALS
HEART RATE: 78 BPM | TEMPERATURE: 98.3 F | RESPIRATION RATE: 16 BRPM | WEIGHT: 183.6 LBS | OXYGEN SATURATION: 96 % | SYSTOLIC BLOOD PRESSURE: 128 MMHG | DIASTOLIC BLOOD PRESSURE: 80 MMHG | HEIGHT: 66 IN | BODY MASS INDEX: 29.51 KG/M2

## 2018-03-05 DIAGNOSIS — I35.0 NONRHEUMATIC AORTIC VALVE STENOSIS: ICD-10-CM

## 2018-03-05 DIAGNOSIS — E78.2 MIXED HYPERLIPIDEMIA: ICD-10-CM

## 2018-03-05 DIAGNOSIS — G25.0 BENIGN ESSENTIAL TREMOR: ICD-10-CM

## 2018-03-05 DIAGNOSIS — E55.9 VITAMIN D INSUFFICIENCY: ICD-10-CM

## 2018-03-05 DIAGNOSIS — I10 ESSENTIAL HYPERTENSION, BENIGN: Primary | ICD-10-CM

## 2018-03-05 DIAGNOSIS — Z85.46 H/O PROSTATE CANCER: ICD-10-CM

## 2018-03-05 PROBLEM — L98.9 SKIN LESION OF FACE: Status: RESOLVED | Noted: 2017-07-19 | Resolved: 2018-03-05

## 2018-03-05 PROBLEM — C61 PROSTATE CANCER (HCC): Status: ACTIVE | Noted: 2018-03-05

## 2018-03-05 PROCEDURE — 99214 OFFICE O/P EST MOD 30 MIN: CPT | Performed by: FAMILY MEDICINE

## 2018-03-05 NOTE — LETTER
[facility]  Ekaterina Love M.D.  [address]   [place] [address]  Fax: [phone]   Authorization for Release/Disclosure of   Protected Health Information   Name: JUAN TRAN : 1950 SSN: [id number]   Address:  [place] [address] Phone:    [phone] (home)    I authorize the entity listed below to release/disclose the PHI below to:   [facility]/Ekaterina Love M.D. and Ekaterina Love M.D.   Provider or Entity Name:     Address   City, State, Zip   Phone:      Fax:     Reason for request: continuity of care   Information to be released:    [  ] LAST COLONOSCOPY,  including any PATH REPORT and follow-up  [  ] LAST FIT/COLOGUARD RESULT [  ] LAST DEXA  [  ] LAST MAMMOGRAM  [  ] LAST PAP  [  ] LAST LABS [  ] RETINA EXAM REPORT  [  ] IMMUNIZATION RECORDS  [  ] Release all info      [  ] Check here and initial the line next to each item to release ALL health information INCLUDING  _____ Care and treatment for drug and / or alcohol abuse  _____ HIV testing, infection status, or AIDS  _____ Genetic Testing    DATES OF SERVICE OR TIME PERIOD TO BE DISCLOSED: _____________  I understand and acknowledge that:  * This Authorization may be revoked at any time by you in writing, except if your health information has already been used or disclosed.  * Your health information that will be used or disclosed as a result of you signing this authorization could be re-disclosed by the recipient. If this occurs, your re-disclosed health information may no longer be protected by State or Federal laws.  * You may refuse to sign this Authorization. Your refusal will not affect your ability to obtain treatment.  * This Authorization becomes effective upon signing and will  on (date) __________.      If no date is indicated, this Authorization will  one (1) year from the signature date.    Name: Juan Tran    Signature:   Date:     3/5/2018       PLEASE FAX REQUESTED RECORDS BACK TO: [phone]

## 2018-03-05 NOTE — TELEPHONE ENCOUNTER
Phone Number Called: [phone]    Message: I called [facility] x2, I spoke to Maria Esther and Gosia both confirmed that they have no records on this patient. They tried looking by his name,  and social security number.    I called [facility], they also stated they had no records on this patient for a colonoscopy.    Left Message for patient to call back: N\A

## 2018-03-06 NOTE — PROGRESS NOTES
Subjective:   CC: Establish care    HPI:     Juan Berumen is a 67 y.o. male, established patient of the clinic, presents with the following concerns:     1. H/O prostate cancer  Patient was diagnosed with prostate cancer in 2012, status post prostatectomy without radiation or chemotherapy. Patient was recently found to have elevated PSA. He is working with urology on this matter. He recently received prostatic radiation, which he tolerated well, denies any side effects. He is also taking oral medication for this condition, but he does not remember name. He denies hematuria, pelvic pain, nausea, vomiting, fever, chills, unintentional weight loss.    2. Nonrheumatic aortic valve stenosis  Patient had history of aortic valve stenosis, status post bovine aortic valve replacement, he is doing well after. Denies dizziness, syncope, presyncope, chest pain, shortness of breath, dyspnea on exertion, orthopnea.    3. Benign essential tremor  Patient has history of mild essential tremor in the right hand. Symptom is tolerable. Patient denies any further intervention at this time.    4. Essential hypertension, benign  Chronic, currently taking lisinopril 20 mg daily. Denies any side effects with medications. Patient is working on lifestyle modification and trying to lose weight.    5. Mixed hyperlipidemia  Chronic, currently taking Lipitor 80 mg daily, denies any side effect. Patient is working on lifestyle modification and trying to lose weight.    Current medicines (including changes today)  Current Outpatient Prescriptions   Medication Sig Dispense Refill   • atorvastatin (LIPITOR) 80 MG tablet Take 1 Tab by mouth every evening. 30 Tab 6   • metoprolol (LOPRESSOR) 25 MG Tab TAKE ONE TABLET BY MOUTH TWICE DAILY 60 Tab 11   • lisinopril (PRINIVIL) 20 MG Tab TAKE ONE TABLET BY MOUTH ONCE DAILY 30 Tab 11   • Omega-3 Fatty Acids (FISH OIL) 1000 MG Cap capsule Take 1,000 mg by mouth every day.     • aspirin (ASA) 81 MG Chew Tab  "chewable tablet Take 1 Tab by mouth every day. 100 Tab 11   • Cholecalciferol (VITAMIN D) 400 UNIT Tab Take 1,000 Units by mouth every day.     • therapeutic multivitamin-minerals (THERAGRAN-M) TABS Take 1 Tab by mouth every day.       No current facility-administered medications for this visit.      He  has a past medical history of Anxiety; Aortic coarctation; Aortic valve stenosis; Chest pain (4/23/2016); Depression; Headache(784.0); Heart murmur; Hyperlipidemia; Hypertension; Migraine; Muscle, jerky movements (uncontrolled) (1974); Nodular hyperplasia of prostate gland (4/11/2011); NSTEMI (non-ST elevated myocardial infarction) (CMS-HCC) (4/25/2016); Prostate cancer (CMS-HCC); Prostate cancer (CMS-HCC); RLQ abdominal pain (7/1/2016); and Tachycardia (4/23/2016). He also has no past medical history of GERD (gastroesophageal reflux disease); Thyroid disease; Tremor, essential; Ulcer (CMS-HCC); or Urinary tract infection, site not specified.    I personally reviewed patient's problem list, allergies, medications, family hx, social hx with patient and update [facility].     REVIEW OF SYSTEMS:  CONSTITUTIONAL:  Denies night sweats, fatigue, malaise, lethargy, fever or chills.  RESPIRATORY:  Denies cough, wheeze, hemoptysis, or shortness of breath.  CARDIOVASCULAR:  Denies chest pains, palpitations, pedal edema     Objective:     Blood pressure 128/80, pulse 78, temperature 36.8 °C (98.3 °F), resp. rate 16, height 1.674 m (5' 5.9\"), weight 83.3 kg (183 lb 9.6 oz), SpO2 96 %. Body mass index is 29.72 kg/m².    Physical Exam:  Constitutional: awake, alert, in no distress, overweight.  Skin: Warm, dry, good turgor, no rashes, bruises, ulcers in visible areas.  Eye: conjunctiva clear, lids neg for edema or lesions.  Respiratory: Unlabored respiratory effort, lungs clear to auscultation, no wheezes, no rhonchi, no rales.  Cardiovascular: Normal S1, S2, no murmur, no pedal edema.  Psych: Oriented x3, affect and mood wnl, intact " judgement and insight.       Assessment and Plan:   The following treatment plan was discussed    1. H/O prostate cancer  Patient was diagnosed with prostate cancer in 2012, status post prostatectomy without radiation or chemotherapy. Patient was recently found to have elevated PSA. He is working with urology on this matter. He recently received prostatic radiation, which he tolerated well, denies any side effects. Plans:  - Will request records from urology  - Follow-up with gynecology as directed    2. Nonrheumatic aortic valve stenosis  Status post bovine aortic valve replacement, doing well after. We'll continue to monitor.    3. Benign essential tremor  Chronic, mild, symptom tolerable, patient declining any further intervention at this time. We'll monitor    4. Essential hypertension, benign  Chronic, well controlled with lisinopril 20 mg daily, will continue. Due for labs.  - COMP METABOLIC PANEL; Future  - MICROALBUMIN CREAT RATIO URINE; Future  - Lifestyle modification and weight loss.    5. Mixed hyperlipidemia  - Continue Lipitor 80 mg daily  - LIPID PROFILE; Future  - Lifestyle modification and weight loss.    6. Vitamin D insufficiency  - Continue vitamin D supplement 1000 units per day.  - VITAMIN D,25 HYDROXY; Future    Ly ABNER Love M.D.      Followup: Return in about 6 months (around 9/5/2018) for Annual wellness visit.    Please note that this dictation was created using voice recognition software. I have made every reasonable attempt to correct obvious errors, but I expect that there are errors of grammar and possibly content that I did not discover before finalizing the note.

## 2018-03-14 ENCOUNTER — TELEPHONE (OUTPATIENT)
Dept: MEDICAL GROUP | Facility: LAB | Age: 68
End: 2018-03-14

## 2018-03-14 NOTE — TELEPHONE ENCOUNTER
Phone Number Called: [phone] (home)     Message: I left Juan a voicemail to please call us back regarding his colonoscopy records.    I called [facility] - they do not have records that he was seen. Have Juan call [facility] [phone] and [facility] [phone] to see if he can location the records maybe under a different name or maybe they have his  wrong. Per Dr. Love see if the patient is willing to do a FIT test? If so, send a telephone to Dr. Love to order the FIT test, once the test is order the patient can  the FIT test at any [facility] lab.    Left Message for patient to call back: yes

## 2018-03-16 ENCOUNTER — HOSPITAL ENCOUNTER (OUTPATIENT)
Dept: LAB | Facility: MEDICAL CENTER | Age: 68
End: 2018-03-16
Attending: NURSE PRACTITIONER
Payer: MEDICARE

## 2018-03-16 DIAGNOSIS — Z95.2 S/P AVR (AORTIC VALVE REPLACEMENT): ICD-10-CM

## 2018-03-16 DIAGNOSIS — Q25.1 COARCTATION OF AORTA: ICD-10-CM

## 2018-03-16 DIAGNOSIS — Z95.3 HISTORY OF HEART VALVE REPLACEMENT WITH BIOPROSTHETIC VALVE: ICD-10-CM

## 2018-03-16 LAB
ANION GAP SERPL CALC-SCNC: 8 MMOL/L (ref 0–11.9)
BUN SERPL-MCNC: 20 MG/DL (ref 8–22)
CALCIUM SERPL-MCNC: 9.1 MG/DL (ref 8.5–10.5)
CHLORIDE SERPL-SCNC: 108 MMOL/L (ref 96–112)
CO2 SERPL-SCNC: 24 MMOL/L (ref 20–33)
CREAT SERPL-MCNC: 1.06 MG/DL (ref 0.5–1.4)
GLUCOSE SERPL-MCNC: 88 MG/DL (ref 65–99)
POTASSIUM SERPL-SCNC: 3.5 MMOL/L (ref 3.6–5.5)
SODIUM SERPL-SCNC: 140 MMOL/L (ref 135–145)

## 2018-03-16 PROCEDURE — 80048 BASIC METABOLIC PNL TOTAL CA: CPT

## 2018-03-16 PROCEDURE — 36415 COLL VENOUS BLD VENIPUNCTURE: CPT

## 2018-03-21 ENCOUNTER — HOSPITAL ENCOUNTER (OUTPATIENT)
Dept: RADIOLOGY | Facility: MEDICAL CENTER | Age: 68
End: 2018-03-21
Attending: NURSE PRACTITIONER
Payer: MEDICARE

## 2018-03-21 DIAGNOSIS — Q25.1 COARCTATION OF AORTA: ICD-10-CM

## 2018-03-21 DIAGNOSIS — Z95.3 HISTORY OF HEART VALVE REPLACEMENT WITH BIOPROSTHETIC VALVE: ICD-10-CM

## 2018-03-21 DIAGNOSIS — Z95.2 S/P AVR (AORTIC VALVE REPLACEMENT): ICD-10-CM

## 2018-03-21 PROCEDURE — 700117 HCHG RX CONTRAST REV CODE 255: Performed by: NURSE PRACTITIONER

## 2018-03-21 PROCEDURE — 71275 CT ANGIOGRAPHY CHEST: CPT

## 2018-03-21 RX ADMIN — IOHEXOL 100 ML: 350 INJECTION, SOLUTION INTRAVENOUS at 14:28

## 2018-03-23 ENCOUNTER — TELEPHONE (OUTPATIENT)
Dept: VASCULAR LAB | Facility: MEDICAL CENTER | Age: 68
End: 2018-03-23

## 2018-03-23 NOTE — TELEPHONE ENCOUNTER
CTA report demonstrates no change in coarctation.  Continue medical management.  Repeat CTA in 2 years.  Will also need echo approx every two years.    Will encourage f/u appt with vascular medicine as patient is overdue.    Michael Bloch, MD  Vascular Care    Cc:  Dr Love

## 2018-03-26 ENCOUNTER — TELEPHONE (OUTPATIENT)
Dept: VASCULAR LAB | Facility: MEDICAL CENTER | Age: 68
End: 2018-03-26

## 2018-03-26 NOTE — TELEPHONE ENCOUNTER
Alton cta as done.     Put on paul for the following:   Echo Sep 2019 (may already be on there)   cta thoracic aorta March 2020   Delete any other entries     Please let him know that coarctation appears unchanged on this CTA   Please strongly encourage him to make a follow up appt with me this summer or fall.  Let me know if unwilling.

## 2018-03-27 NOTE — TELEPHONE ENCOUNTER
Spoke with the pt let him know that his coarctation of the aorta appears unchanged on this CTA. He made an appt with Dr. Bloch for fall. KATE Finnegan.

## 2018-06-29 ENCOUNTER — HOSPITAL ENCOUNTER (OUTPATIENT)
Dept: LAB | Facility: MEDICAL CENTER | Age: 68
End: 2018-06-29
Attending: FAMILY MEDICINE
Payer: MEDICARE

## 2018-06-29 ENCOUNTER — HOSPITAL ENCOUNTER (OUTPATIENT)
Dept: LAB | Facility: MEDICAL CENTER | Age: 68
End: 2018-06-29
Attending: RADIOLOGY
Payer: MEDICARE

## 2018-06-29 DIAGNOSIS — E55.9 VITAMIN D INSUFFICIENCY: ICD-10-CM

## 2018-06-29 DIAGNOSIS — E78.2 MIXED HYPERLIPIDEMIA: ICD-10-CM

## 2018-06-29 DIAGNOSIS — I10 ESSENTIAL HYPERTENSION, BENIGN: ICD-10-CM

## 2018-06-29 LAB
ALBUMIN SERPL BCP-MCNC: 3.6 G/DL (ref 3.2–4.9)
ALBUMIN/GLOB SERPL: 1.3 G/DL
ALP SERPL-CCNC: 79 U/L (ref 30–99)
ALT SERPL-CCNC: 28 U/L (ref 2–50)
ANION GAP SERPL CALC-SCNC: 7 MMOL/L (ref 0–11.9)
AST SERPL-CCNC: 26 U/L (ref 12–45)
BILIRUB SERPL-MCNC: 0.6 MG/DL (ref 0.1–1.5)
BUN SERPL-MCNC: 15 MG/DL (ref 8–22)
CALCIUM SERPL-MCNC: 8.6 MG/DL (ref 8.5–10.5)
CHLORIDE SERPL-SCNC: 107 MMOL/L (ref 96–112)
CHOLEST SERPL-MCNC: 130 MG/DL (ref 100–199)
CO2 SERPL-SCNC: 24 MMOL/L (ref 20–33)
CREAT SERPL-MCNC: 0.98 MG/DL (ref 0.5–1.4)
GLOBULIN SER CALC-MCNC: 2.8 G/DL (ref 1.9–3.5)
GLUCOSE SERPL-MCNC: 104 MG/DL (ref 65–99)
HDLC SERPL-MCNC: 30 MG/DL
LDLC SERPL CALC-MCNC: 80 MG/DL
POTASSIUM SERPL-SCNC: 4.2 MMOL/L (ref 3.6–5.5)
PROT SERPL-MCNC: 6.4 G/DL (ref 6–8.2)
SODIUM SERPL-SCNC: 138 MMOL/L (ref 135–145)
TRIGL SERPL-MCNC: 100 MG/DL (ref 0–149)

## 2018-06-29 PROCEDURE — 80053 COMPREHEN METABOLIC PANEL: CPT

## 2018-06-29 PROCEDURE — 36415 COLL VENOUS BLD VENIPUNCTURE: CPT

## 2018-06-29 PROCEDURE — 80061 LIPID PANEL: CPT

## 2018-06-29 PROCEDURE — 82570 ASSAY OF URINE CREATININE: CPT

## 2018-06-29 PROCEDURE — 82043 UR ALBUMIN QUANTITATIVE: CPT

## 2018-06-29 PROCEDURE — 84153 ASSAY OF PSA TOTAL: CPT

## 2018-06-29 PROCEDURE — 82306 VITAMIN D 25 HYDROXY: CPT

## 2018-06-30 LAB
25(OH)D3 SERPL-MCNC: 36 NG/ML (ref 30–100)
CREAT UR-MCNC: 149.9 MG/DL
MICROALBUMIN UR-MCNC: 1 MG/DL
MICROALBUMIN/CREAT UR: 7 MG/G (ref 0–30)
PSA SERPL-MCNC: <0.01 NG/ML (ref 0–4)

## 2018-07-12 ENCOUNTER — OFFICE VISIT (OUTPATIENT)
Dept: MEDICAL GROUP | Age: 68
End: 2018-07-12
Payer: MEDICARE

## 2018-07-12 VITALS
WEIGHT: 185 LBS | HEIGHT: 66 IN | OXYGEN SATURATION: 98 % | SYSTOLIC BLOOD PRESSURE: 138 MMHG | BODY MASS INDEX: 29.73 KG/M2 | DIASTOLIC BLOOD PRESSURE: 86 MMHG | HEART RATE: 54 BPM | TEMPERATURE: 98.7 F

## 2018-07-12 DIAGNOSIS — E78.2 MIXED HYPERLIPIDEMIA: ICD-10-CM

## 2018-07-12 DIAGNOSIS — Z12.12 SCREENING FOR COLORECTAL CANCER: ICD-10-CM

## 2018-07-12 DIAGNOSIS — G25.0 BENIGN ESSENTIAL TREMOR: ICD-10-CM

## 2018-07-12 DIAGNOSIS — L02.414 ABSCESS OF LEFT ARM: ICD-10-CM

## 2018-07-12 DIAGNOSIS — I21.4 NSTEMI (NON-ST ELEVATED MYOCARDIAL INFARCTION) (HCC): ICD-10-CM

## 2018-07-12 DIAGNOSIS — Q25.1 COARCTATION OF AORTA: ICD-10-CM

## 2018-07-12 DIAGNOSIS — Z12.11 SCREENING FOR COLORECTAL CANCER: ICD-10-CM

## 2018-07-12 DIAGNOSIS — I77.810 DILATATION OF THORACIC AORTA (HCC): ICD-10-CM

## 2018-07-12 DIAGNOSIS — Z85.46 H/O PROSTATE CANCER: ICD-10-CM

## 2018-07-12 DIAGNOSIS — C61 MALIGNANT NEOPLASM OF PROSTATE (HCC): ICD-10-CM

## 2018-07-12 DIAGNOSIS — Z95.2 S/P AVR (AORTIC VALVE REPLACEMENT): ICD-10-CM

## 2018-07-12 DIAGNOSIS — I10 ESSENTIAL HYPERTENSION, BENIGN: ICD-10-CM

## 2018-07-12 PROCEDURE — 99203 OFFICE O/P NEW LOW 30 MIN: CPT | Performed by: INTERNAL MEDICINE

## 2018-07-12 RX ORDER — AMOXICILLIN AND CLAVULANATE POTASSIUM 875; 125 MG/1; MG/1
1 TABLET, FILM COATED ORAL 2 TIMES DAILY
Qty: 20 QUANTITY SUFFICIENT | Refills: 0 | Status: SHIPPED | OUTPATIENT
Start: 2018-07-12 | End: 2018-07-27

## 2018-07-12 RX ORDER — SULFAMETHOXAZOLE AND TRIMETHOPRIM 800; 160 MG/1; MG/1
1 TABLET ORAL 2 TIMES DAILY
Qty: 20 TAB | Refills: 0 | Status: SHIPPED | OUTPATIENT
Start: 2018-07-12 | End: 2018-07-27

## 2018-07-12 ASSESSMENT — ENCOUNTER SYMPTOMS
GASTROINTESTINAL NEGATIVE: 1
RESPIRATORY NEGATIVE: 1
CARDIOVASCULAR NEGATIVE: 1
MUSCULOSKELETAL NEGATIVE: 1
EYES NEGATIVE: 1
PSYCHIATRIC NEGATIVE: 1
CONSTITUTIONAL NEGATIVE: 1
NEUROLOGICAL NEGATIVE: 1

## 2018-07-12 ASSESSMENT — PATIENT HEALTH QUESTIONNAIRE - PHQ9: CLINICAL INTERPRETATION OF PHQ2 SCORE: 0

## 2018-07-12 NOTE — PROGRESS NOTES
Subjective:      Juan Berumen is a 67 y.o. male who presents with Insect Bite (right forearm x2 weeks)  There is a new patient to me unable see PCP today.  He presents with a two-week history of left arm skin lesion that he first noticed after getting either stung by an insect while gardening or getting stuck by a thorn bush while gardening.  Since then it has developed into a swollen painful red draining lesion.  He has had no response to topical Neosporin ointment.    Also here for follow-up of his other ongoing chronic medical problems which need review including his history of systolic heart failure which resolved with replacement of his stenotic aortic valve 2 years ago and follow-up echocardiogram last year was normal with good ejection fraction of greater than 60%.  There is no evidence of any diastolic dysfunction either.  He was diagnosed with a non-ST elevation MI at that time as well and he has had no further problems with CAD since then.  He is being followed by cardiology for these issues.      And  .fiu  \  Patient Active Problem List    Diagnosis Date Noted   • NSTEMI (non-ST elevated myocardial infarction) ([facility]) 04/25/2016     Priority: High   • Dilatation of thoracic aorta (HCC) 07/12/2018   • History of non-ST elevation myocardial infarction (NSTEMI) 07/19/2017   • Benign essential tremor 07/12/2017   • S/P AVR (aortic valve replacement) 05/13/2016   • History of heart valve replacement with bioprosthetic valve [Z95.4] 05/04/2016   • Coarctation of aorta, CTA and ECHO every 2 years, f/u vascular medicine (Dr. Bloch) 04/26/2016   • H/O prostate cancer 07/30/2014   • Mixed hyperlipidemia 02/01/2013   • Essential hypertension, benign 02/01/2013     Allergies   Allergen Reactions   • Zoloft      Increased anxiety.     .  07/12/2018  Outpatient Medications Prior to Visit   Medication Sig Dispense Refill   • atorvastatin (LIPITOR) 80 MG tablet Take 1 Tab by mouth every evening. Patient needs a  "follow up appointment for further refills 90 Tab 2   • metoprolol (LOPRESSOR) 25 MG Tab TAKE ONE TABLET BY MOUTH TWICE DAILY 60 Tab 11   • lisinopril (PRINIVIL) 20 MG Tab TAKE ONE TABLET BY MOUTH ONCE DAILY 30 Tab 11   • Omega-3 Fatty Acids (FISH OIL) 1000 MG Cap capsule Take 1,000 mg by mouth every day.     • aspirin (ASA) 81 MG Chew Tab chewable tablet Take 1 Tab by mouth every day. 100 Tab 11   • Cholecalciferol (VITAMIN D) 400 UNIT Tab Take 1,000 Units by mouth every day.     • therapeutic multivitamin-minerals (THERAGRAN-M) TABS Take 1 Tab by mouth every day.       No facility-administered medications prior to visit.                  HPI    Review of Systems   Constitutional: Negative.    HENT: Negative.    Eyes: Negative.    Respiratory: Negative.    Cardiovascular: Negative.    Gastrointestinal: Negative.    Genitourinary: Negative.    Musculoskeletal: Negative.    Skin: Negative.    Neurological: Negative.    Endo/Heme/Allergies: Negative.    Psychiatric/Behavioral: Negative.           Objective:     /86   Pulse (!) 54   Temp 37.1 °C (98.7 °F)   Ht 1.674 m (5' 5.91\")   Wt 83.9 kg (185 lb)   SpO2 98%   BMI 29.95 kg/m²      Physical Exam   Constitutional: He is oriented to person, place, and time. He appears well-developed and well-nourished. No distress.   HENT:   Head: Normocephalic and atraumatic.   Right Ear: External ear normal.   Left Ear: External ear normal.   Nose: Nose normal.   Mouth/Throat: Oropharynx is clear and moist. No oropharyngeal exudate.   Eyes: Conjunctivae and EOM are normal. Pupils are equal, round, and reactive to light. Right eye exhibits no discharge. Left eye exhibits no discharge. No scleral icterus.   Neck: Normal range of motion. Neck supple. No JVD present. No tracheal deviation present. No thyromegaly present.   Cardiovascular: Normal rate, regular rhythm, normal heart sounds and intact distal pulses.  Exam reveals no gallop and no friction rub.    No murmur " heard.  Pulmonary/Chest: Effort normal and breath sounds normal. No stridor. No respiratory distress. He has no wheezes. He has no rales. He exhibits no tenderness.   Abdominal: Soft. Bowel sounds are normal. He exhibits no distension and no mass. There is no tenderness. There is no rebound and no guarding.   Musculoskeletal: Normal range of motion. He exhibits no edema or tenderness.   Lymphadenopathy:     He has no cervical adenopathy.   Neurological: He is alert and oriented to person, place, and time. He has normal reflexes. He displays normal reflexes. He exhibits normal muscle tone. Coordination normal.   Skin: Skin is warm and dry. Rash noted. He is not diaphoretic. There is erythema. No pallor.   Left forearm mid dorsal aspect she has a 2 cm raised cystic reddish brown lesion with central excoriation that is nontender to palpation.   Psychiatric: He has a normal mood and affect. His behavior is normal. Judgment and thought content normal.     Hospital Outpatient Visit on 06/29/2018   Component Date Value   • Prostatic Specific Antig* 06/29/2018 <0.01    Hospital Outpatient Visit on 06/29/2018   Component Date Value   • Sodium 06/29/2018 138    • Potassium 06/29/2018 4.2    • Chloride 06/29/2018 107    • Co2 06/29/2018 24    • Anion Gap 06/29/2018 7.0    • Glucose 06/29/2018 104*   • Bun 06/29/2018 15    • Creatinine 06/29/2018 0.98    • Calcium 06/29/2018 8.6    • AST(SGOT) 06/29/2018 26    • ALT(SGPT) 06/29/2018 28    • Alkaline Phosphatase 06/29/2018 79    • Total Bilirubin 06/29/2018 0.6    • Albumin 06/29/2018 3.6    • Total Protein 06/29/2018 6.4    • Globulin 06/29/2018 2.8    • A-G Ratio 06/29/2018 1.3    • Creatinine, Urine 06/29/2018 149.90    • Microalbumin, Urine Rand* 06/29/2018 1.0    • Micro Alb Creat Ratio 06/29/2018 7    • Cholesterol,Tot 06/29/2018 130    • Triglycerides 06/29/2018 100    • HDL 06/29/2018 30*   • LDL 06/29/2018 80    • 25-Hydroxy   Vitamin D 25 06/29/2018 36    • GFR If   06/29/2018 >60    • GFR If Non  Ameri* 06/29/2018 >60       Lab Results   Component Value Date/Time    HBA1C 5.8 (H) 07/01/2016 12:59 PM    HBA1C 5.4 04/27/2016 02:53 PM     Lab Results   Component Value Date/Time    SODIUM 138 06/29/2018 06:43 AM    POTASSIUM 4.2 06/29/2018 06:43 AM    CHLORIDE 107 06/29/2018 06:43 AM    CO2 24 06/29/2018 06:43 AM    GLUCOSE 104 (H) 06/29/2018 06:43 AM    BUN 15 06/29/2018 06:43 AM    CREATININE 0.98 06/29/2018 06:43 AM    CREATININE 1.13 04/13/2011 06:30 AM    BUNCREATRAT 15 01/09/2015 08:12 AM    BUNCREATRAT 12 04/13/2011 06:30 AM    ALKPHOSPHAT 79 06/29/2018 06:43 AM    ASTSGOT 26 06/29/2018 06:43 AM    ALTSGPT 28 06/29/2018 06:43 AM    TBILIRUBIN 0.6 06/29/2018 06:43 AM     Lab Results   Component Value Date/Time    INR 2.6 07/07/2016    INR 2.3 06/16/2016    INR 1.7 06/01/2016     Lab Results   Component Value Date/Time    CHOLSTRLTOT 130 06/29/2018 06:43 AM    LDL 80 06/29/2018 06:43 AM    HDL 30 (A) 06/29/2018 06:43 AM    TRIGLYCERIDE 100 06/29/2018 06:43 AM       No results found for: TESTOSTERONE  Lab Results   Component Value Date/Time    TSH 3.760 08/21/2014 03:18 PM    TSH 6.820 (H) 04/13/2011 06:30 AM     Lab Results   Component Value Date/Time    FREET4 0.81 06/07/2016 11:38 AM    FREET4 0.77 04/24/2016 08:45 PM     No results found for: URICACID  No components found for: VITB12  Lab Results   Component Value Date/Time    25HYDROXY 36 06/29/2018 06:43 AM    25HYDROXY 24 (L) 06/07/2016 11:38 AM                Assessment/Plan:     1. Abscess of left arm-unclear etiology.  Possibly secondary to puncture wound from a thorn bush or from insect.  Will cover the possibility of both staph and strep with 2 different antibiotics and have patient follow-up in 2 weeks with PCP.  Meantime he will apply warm moist compresses to facilitate drainage.     - amoxicillin-clavulanate (AUGMENTIN) 875-125 MG Tab; Take 1 Tab by mouth 2 times a day.  Dispense: 20  Quantity Sufficient; Refill: 0  - sulfamethoxazole-trimethoprim (BACTRIM DS) 800-160 MG tablet; Take 1 Tab by mouth 2 times a day.  Dispense: 20 Tab; Refill: 0    2. Screening for colorectal cancer-request for colonoscopy report placed to GI C.  Patient thinks it was done within the last 10 years but is not sure.     - COLOGUARD (FIT DNA)    3. Dilatation of thoracic aorta (HCC)  This problem appears to have resolved with correction of his severe aortic stenosis through valve replacement 2 years ago.  Not apparent on most recent echocardiogram or chest x-ray.  To be removed from problem list.    4. Mixed hyperlipidemia   Under good control. Continue same regimen.      5. Essential hypertension, benign    Under good control. Continue same regimen.      6. Benign essential tremor   Under good control. Continue same regimen.      7. S/P AVR (aortic valve replacement)   Under good control. Continue same regimen.      8. H/O prostate cancer patient still has ongoing surveillance of his prostate CA by his urologist.    9. NSTEMI (non-ST elevated myocardial infarction) ([facility])      This has resolved.  Echocardiogram shows good LV function.  Continue follow-up with cardiologist for CAD.    MDX diagnosis of systolic heart failure has resolved.  Removed from problem list.  Most recent ejection fraction was over 60%.  No diastolic dysfunction according to echocardiogram.    With regard to the previous diagnosis thoracic aortic ectasia, this problem has resolved.  Review of the most recent echocardiogram and CTA of the chest and CT abdomen shows no evidence of aortic dilatation or ectasia.      There are still findings on these imaging studies of the coarctation of the aorta which is being followed by Dr. Rivera.  And this is stable.  As noted above.(Coarctation of aorta, CTA and ECHO every 2 years, f/u vascular medicine (Dr. Bloch))    Diagnosis of prostate cancer is still current.  He is being followed for any recurrence of  this.      45 minute face-to-face encounter took place today.  More than half of this time was spent in the coordination of care of the above problems, as well as counseling.

## 2018-07-24 ENCOUNTER — TELEPHONE (OUTPATIENT)
Dept: MEDICAL GROUP | Age: 68
End: 2018-07-24

## 2018-07-24 NOTE — TELEPHONE ENCOUNTER
Future Appointments       Provider Department Center    7/27/2018 10:40 AM Ekaterina Love M.D. [place] [address] [address]    9/6/2018 3:40 PM Ekaterina Love M.D.; [facility]  [place] [address] [address]    9/10/2018 4:40 PM Michael J Bloch, M.D. [facility] [place] [facility]          ESTABLISHED PATIENT PRE-VISIT PLANNING     Note: Patient will not be contacted if there is no indication to call.     1.  Reviewed notes from the last few office visits within the medical group: Yes    2.  If any orders were placed at last visit or intended to be done for this visit (i.e. 6 mos follow-up), do we have Results/Consult Notes?        •  Labs - Labs were not ordered at last office visit.   Note: If patient appointment is for lab review and patient did not complete labs, check with provider if OK to reschedule patient until labs completed.       •  Imaging - Imaging was not ordered at last office visit.       •  Referrals - No referrals were ordered at last office visit.    3. Is this appointment scheduled as a Hospital Follow-Up? No    4.  Immunizations were updated in Epic using WebIZ?: Epic matches WebIZ       •  Web Iz Recommendations: PNEUMOVAX (PPSV23)    5.  Patient is due for the following Health Maintenance Topics:   Health Maintenance Due   Topic Date Due   • COLONOSCOPY  07/15/2001   • IMM PNEUMOCOCCAL 65+ (ADULT) LOW/MEDIUM RISK SERIES (2 of 2 - PPSV23) 07/12/2018   • Annual Wellness Visit  07/13/2018           6.  MDX printed for Provider? NO    7.  Patient was NOT informed to arrive 15 min prior to their scheduled appointment and bring in their medication bottles.

## 2018-07-27 ENCOUNTER — OFFICE VISIT (OUTPATIENT)
Dept: MEDICAL GROUP | Age: 68
End: 2018-07-27
Payer: MEDICARE

## 2018-07-27 ENCOUNTER — HOSPITAL ENCOUNTER (OUTPATIENT)
Dept: LAB | Facility: MEDICAL CENTER | Age: 68
End: 2018-07-27
Attending: FAMILY MEDICINE
Payer: MEDICARE

## 2018-07-27 VITALS
HEART RATE: 71 BPM | OXYGEN SATURATION: 97 % | DIASTOLIC BLOOD PRESSURE: 68 MMHG | SYSTOLIC BLOOD PRESSURE: 128 MMHG | TEMPERATURE: 97.5 F | BODY MASS INDEX: 29.83 KG/M2 | WEIGHT: 185.6 LBS | HEIGHT: 66 IN | RESPIRATION RATE: 16 BRPM

## 2018-07-27 DIAGNOSIS — L72.3 SEBACEOUS CYST: ICD-10-CM

## 2018-07-27 DIAGNOSIS — K86.89 PANCREATIC DUCT DILATED: ICD-10-CM

## 2018-07-27 DIAGNOSIS — Z12.11 COLON CANCER SCREENING: ICD-10-CM

## 2018-07-27 DIAGNOSIS — Z85.46 PERSONAL HISTORY OF MALIGNANT NEOPLASM OF PROSTATE: Primary | ICD-10-CM

## 2018-07-27 DIAGNOSIS — Z23 NEED FOR PNEUMOCOCCAL VACCINATION: ICD-10-CM

## 2018-07-27 DIAGNOSIS — E66.9 OBESITY (BMI 30-39.9): ICD-10-CM

## 2018-07-27 LAB — LIPASE SERPL-CCNC: 85 U/L (ref 11–82)

## 2018-07-27 PROCEDURE — G0009 ADMIN PNEUMOCOCCAL VACCINE: HCPCS | Performed by: FAMILY MEDICINE

## 2018-07-27 PROCEDURE — 36415 COLL VENOUS BLD VENIPUNCTURE: CPT

## 2018-07-27 PROCEDURE — 90732 PPSV23 VACC 2 YRS+ SUBQ/IM: CPT | Performed by: FAMILY MEDICINE

## 2018-07-27 PROCEDURE — 99214 OFFICE O/P EST MOD 30 MIN: CPT | Mod: 25 | Performed by: FAMILY MEDICINE

## 2018-07-27 PROCEDURE — 83690 ASSAY OF LIPASE: CPT

## 2018-07-27 NOTE — PROGRESS NOTES
Subjective:   CC: lesion on right arm    HPI:     Juan Berumen is a 68 y.o. male, established patient of the clinic, presents with the following concerns:     1. Personal history of malignant neoplasm of prostate  Patient had history of prostate cancer, diagnosed in 2012, status post prostatectomy.  Patient recently underwent chemo and radiation for elevated PSA.  His last dose work of chemo was some time last year.  Follow-up PSA has been within normal limits.  Patient has consistent follow-up with urology.  Patient is asymptomatic.    2. Sebaceous cyst, right arm  Patient was seen by Dr. Arellano on July 12, 2018.  He was diagnosed with abscess on the extensor surface of the right arm.  He was treated with Augmentin and Bactrim.  He did have mild watery diarrhea during the course of the antibiotics, however, diarrhea has resolved.  Patient is feeling well.  The redness and swelling surrounding the lesion on his right arm have resolved.  However, the lesion remains the same.    3. Pancreatic duct dilated  Patient had CTA done in March 2018 after valvular surgery.  Incidental findings of pancreatic ductal dilation in the distal body and tail with proximal calcification measuring 4 mm.  Patient had normal abdominal ultrasound in July 2016.  Differential diagnosis include interval pancreatitis, but underlying pancreatic body mass is not excluded.  Patient drinks alcohol daily for a number of decades.  He otherwise asymptomatic.      4. Colon cancer screening  Cologuard ordered by Dr. Arellano on July 12, 2018. However, pt states that he has not heard from anyone. He had normal colonoscopy 110 year ago.     Current medicines (including changes today)  Current Outpatient Prescriptions   Medication Sig Dispense Refill   • atorvastatin (LIPITOR) 80 MG tablet Take 1 Tab by mouth every evening. Patient needs a follow up appointment for further refills 90 Tab 2   • metoprolol (LOPRESSOR) 25 MG Tab TAKE ONE TABLET BY MOUTH  "TWICE DAILY 60 Tab 11   • lisinopril (PRINIVIL) 20 MG Tab TAKE ONE TABLET BY MOUTH ONCE DAILY 30 Tab 11   • aspirin (ASA) 81 MG Chew Tab chewable tablet Take 1 Tab by mouth every day. 100 Tab 11   • Cholecalciferol (VITAMIN D) 400 UNIT Tab Take 1,000 Units by mouth every day.     • therapeutic multivitamin-minerals (THERAGRAN-M) TABS Take 1 Tab by mouth every day.     • Omega-3 Fatty Acids (FISH OIL) 1000 MG Cap capsule Take 1,000 mg by mouth every day.       No current facility-administered medications for this visit.      He  has a past medical history of Anxiety; Aortic coarctation; Aortic valve stenosis; Chest pain (4/23/2016); Depression; Headache(784.0); Heart murmur; Hyperlipidemia; Hypertension; Migraine; Muscle, jerky movements (uncontrolled) (1974); Nodular hyperplasia of prostate gland (4/11/2011); NSTEMI (non-ST elevated myocardial infarction) (HCC) (4/25/2016); Prostate cancer ([facility] Prostate cancer ([facility]); RLQ abdominal pain (7/1/2016); and Tachycardia (4/23/2016). He also has no past medical history of GERD (gastroesophageal reflux disease); Thyroid disease; Tremor, essential; Ulcer; or Urinary tract infection, site not specified.    I personally reviewed patient's problem list, allergies, medications, family hx, social hx with patient and update EPIC.     REVIEW OF SYSTEMS:  CONSTITUTIONAL:  Denies night sweats, fatigue, malaise, lethargy, fever or chills.  RESPIRATORY:  Denies cough, wheeze, hemoptysis, or shortness of breath.  CARDIOVASCULAR:  Denies chest pains, palpitations, pedal edema     Objective:     Blood pressure 128/68, pulse 71, temperature 36.4 °C (97.5 °F), resp. rate 16, height 1.674 m (5' 5.91\"), weight 84.2 kg (185 lb 9.6 oz), SpO2 97 %. Body mass index is 30.04 kg/m².    Physical Exam:  Constitutional: awake, alert, in no distress.  Skin: Warm, dry, good turgor, no rashes, bruises, ulcers in visible areas.  - 0.8, firm, non-tender, mildly erythematous, mobile lesion on the extensor " surface of the right arm.   Eye: conjunctiva clear, lids neg for edema or lesions.  Neck: Trachea midline, no masses, no thyromegaly. No cervical or supraclavicular lymphadenopathy  Respiratory: Unlabored respiratory effort, lungs clear to auscultation, no wheezes, no rales.  Cardiovascular: Normal S1, S2, no murmur, no pedal edema.  Psych: Oriented x3, affect and mood wnl, intact judgement and insight.       Assessment and Plan:   The following treatment plan was discussed    1. Personal history of malignant neoplasm of prostate  Patient had history of prostate cancer, diagnosed in 2012, status post prostatectomy.  Patient recently underwent chemo and radiation for elevated PSA.  His last dose of chemo was some time last year.  Follow-up PSA has been within normal limits.  Patient has consistent follow-up with urology.  Patient is asymptomatic. Plans:   - f/u with urology as directed.    2. Sebaceous cyst, right arm  Inflamed, infected sebaceous cyst of the right arm, appears to respond to antibiotics. Plans:   - will schedule appointment for removal.     3. Pancreatic duct dilated  Incidental findings of dilated pancreatic ducts with calcification, likely secondary to chronic pancreatitis from chronic alcohol abuse. However, pancreatic mass is not excluded. Plans:   - US-LIVER AND BILIARY TREE; Future  - LIPASE SERUM; Future  - recommended alcohol cessation    4. Colon cancer screening  - COLOGUARD (FIT DNA)    5. Need for pneumococcal vaccination  - Pneumococal Polysaccharide Vaccine 23-Valent =>1yo SQ/IM    6. Obesity (BMI 30-39.9)  - Patient identified as having weight management issue.  Appropriate orders and counseling given.    Ekaterina Love M.D.      Followup: Return in about 4 weeks (around 8/24/2018) for skin lesion removal.    Please note that this dictation was created using voice recognition software. I have made every reasonable attempt to correct obvious errors, but I expect that there are errors of  grammar and possibly content that I did not discover before finalizing the note.

## 2018-08-09 ENCOUNTER — TELEPHONE (OUTPATIENT)
Dept: MEDICAL GROUP | Age: 68
End: 2018-08-09

## 2018-08-09 ENCOUNTER — HOSPITAL ENCOUNTER (OUTPATIENT)
Dept: RADIOLOGY | Facility: MEDICAL CENTER | Age: 68
End: 2018-08-09
Attending: FAMILY MEDICINE
Payer: MEDICARE

## 2018-08-09 DIAGNOSIS — K86.89 PANCREATIC DUCT DILATED: ICD-10-CM

## 2018-08-09 PROCEDURE — 76705 ECHO EXAM OF ABDOMEN: CPT

## 2018-08-09 NOTE — TELEPHONE ENCOUNTER
Future Appointments       Provider Department Center    8/10/2018 10:20 AM Ekaterina Love M.D. [place] [address] [address]    9/6/2018 3:40 PM Ekaterina Love M.D.; [facility]  [place] [address] [address]    9/10/2018 4:40 PM Michael J Bloch, M.D. [facility] [place] [facility]          ESTABLISHED PATIENT PRE-VISIT PLANNING     Note: Patient will not be contacted if there is no indication to call.     1.  Reviewed notes from the last few office visits within the medical group: Yes    2.  If any orders were placed at last visit or intended to be done for this visit (i.e. 6 mos follow-up), do we have Results/Consult Notes?        •  Labs - Labs ordered, completed on 7/27/18 and results are in chart.   Note: If patient appointment is for lab review and patient did not complete labs, check with provider if OK to reschedule patient until labs completed.       •  Imaging - Imaging ordered, completed and results are in chart.       •  Referrals - No referrals were ordered at last office visit.    3. Is this appointment scheduled as a Hospital Follow-Up? No    4.  Immunizations were updated in Epic using WebIZ?: Epic matches WebIZ       •  Web Iz Recommendations: Patient is up to date on all vaccines    5.  Patient is due for the following Health Maintenance Topics:   Health Maintenance Due   Topic Date Due   • COLOGUARD STOOL DNA  07/15/2001   • IMM ZOSTER VACCINES (2 of 3) 12/21/2014   • Annual Wellness Visit  07/13/2018           6.  MDX printed for Provider? NO  MDX complete    7.  Patient was NOT informed to arrive 15 min prior to their scheduled appointment and bring in their medication bottles.

## 2018-08-10 ENCOUNTER — HOSPITAL ENCOUNTER (OUTPATIENT)
Facility: MEDICAL CENTER | Age: 68
End: 2018-08-10
Attending: FAMILY MEDICINE
Payer: MEDICARE

## 2018-08-10 ENCOUNTER — OFFICE VISIT (OUTPATIENT)
Dept: MEDICAL GROUP | Age: 68
End: 2018-08-10
Payer: MEDICARE

## 2018-08-10 VITALS
HEIGHT: 66 IN | RESPIRATION RATE: 16 BRPM | DIASTOLIC BLOOD PRESSURE: 80 MMHG | WEIGHT: 188.4 LBS | OXYGEN SATURATION: 97 % | SYSTOLIC BLOOD PRESSURE: 126 MMHG | BODY MASS INDEX: 30.28 KG/M2 | TEMPERATURE: 98.2 F | HEART RATE: 58 BPM

## 2018-08-10 DIAGNOSIS — D48.5 NEOPLASM OF UNCERTAIN BEHAVIOR OF SKIN: ICD-10-CM

## 2018-08-10 PROCEDURE — 88305 TISSUE EXAM BY PATHOLOGIST: CPT

## 2018-08-10 PROCEDURE — 11403 EXC TR-EXT B9+MARG 2.1-3CM: CPT | Performed by: FAMILY MEDICINE

## 2018-08-10 PROCEDURE — 88304 TISSUE EXAM BY PATHOLOGIST: CPT

## 2018-08-12 NOTE — PROGRESS NOTES
Procedure note: EXCISION OF SUSPICIOUS SKIN LESION     Indication: suspicious skin lesion concerning for neoplasm  Performing physician: Ekaterina Love M.D.   Assistant: Scarlett  Location: extensor surface of right forearm.   Risks, benefits, potential complications, and alternative options discussed with patient. Patient consented to the procedure.   Local anesthesia is achieved with 2% Lidocaine w/ EPI.     The lesion was identified, marked, and cleansed thoroughly with Povidone-Iodine sticks x3. The surgical site was prepared and draped in the usual sterile manner. 3 cm elliptical skin incision was made in linear fashion. The lesion was dissected and excised using #15 blade. Hemostasis was achieved with direct pressure. The wound was closed by simple interrupted sutures. Patient tolerates the procedure well. No immediate complications noted.      EBL: minimal    Surgical specimen was sent to pathology.     Follow-up: Standard post-procedure care is explained and return precautions are given. Patient will return to the clinic in 10 days for suture removal.

## 2018-08-20 ENCOUNTER — OFFICE VISIT (OUTPATIENT)
Dept: MEDICAL GROUP | Age: 68
End: 2018-08-20
Payer: MEDICARE

## 2018-08-20 VITALS
HEART RATE: 62 BPM | TEMPERATURE: 97.9 F | BODY MASS INDEX: 30.05 KG/M2 | RESPIRATION RATE: 16 BRPM | OXYGEN SATURATION: 96 % | SYSTOLIC BLOOD PRESSURE: 118 MMHG | HEIGHT: 66 IN | DIASTOLIC BLOOD PRESSURE: 72 MMHG | WEIGHT: 187 LBS

## 2018-08-20 DIAGNOSIS — M79.18 RHOMBOID MUSCLE PAIN: Primary | ICD-10-CM

## 2018-08-20 DIAGNOSIS — Z48.02 VISIT FOR SUTURE REMOVAL: ICD-10-CM

## 2018-08-20 DIAGNOSIS — G25.0 BENIGN ESSENTIAL TREMOR: ICD-10-CM

## 2018-08-20 PROCEDURE — 99214 OFFICE O/P EST MOD 30 MIN: CPT | Performed by: FAMILY MEDICINE

## 2018-08-21 NOTE — PROGRESS NOTES
Subjective:   CC: Thoracic back pain    HPI:     Juan Berumen is a 68 y.o. male, established patient of the clinic, presents with the following concerns:     Patient complains of right-sided midthoracic back pain for the past 2-3 years.  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  The nature of his employment require repetitive uses of his upper extremity and heavy lifting.  Pain is described as sharp, 7/10 in severity, nonradiating mostly felt between the right shoulder place and thoracic spine.  Symptoms are usually worse at the end of the day and when he first wake up in the morning.  Patient has not tried any medication for symptoms.  Patient is planning to reduce work hours to get ready for [employer].    Patient was recently seen by me on August 10, 2018 for removal of concerning lesion on his right forearm.  Pathology report was consistent with epidermal inclusion cyst.  The surgical wound is healing well.  Patient denies fever, chills, surgical site bleeding/discharge, worsening pain, redness, swelling.    Patient also has history of benign essential tremor affecting the right hand.  Symptom is mild and does not require treatment at this time.    Current medicines (including changes today)  Current Outpatient Prescriptions   Medication Sig Dispense Refill   • lisinopril (PRINIVIL) 20 MG Tab TAKE ONE TABLET BY MOUTH ONCE DAILY 30 Tab 0   • atorvastatin (LIPITOR) 80 MG tablet Take 1 Tab by mouth every evening. Patient needs a follow up appointment for further refills 90 Tab 2   • metoprolol (LOPRESSOR) 25 MG Tab TAKE ONE TABLET BY MOUTH TWICE DAILY 60 Tab 11   • Omega-3 Fatty Acids (FISH OIL) 1000 MG Cap capsule Take 1,000 mg by mouth every day.     • aspirin (ASA) 81 MG Chew Tab chewable tablet Take 1 Tab by mouth every day. 100 Tab 11   • Cholecalciferol (VITAMIN D) 400 UNIT Tab Take 1,000 Units by mouth every day.     • therapeutic multivitamin-minerals (THERAGRAN-M) TABS Take 1  "Tab by mouth every day.       No current facility-administered medications for this visit.      He  has a past medical history of Anxiety; Aortic coarctation; Aortic valve stenosis; Chest pain (4/23/2016); Depression; Headache(784.0); Heart murmur; Hyperlipidemia; Hypertension; Migraine; Muscle, jerky movements (uncontrolled) (1974); Nodular hyperplasia of prostate gland (4/11/2011); NSTEMI (non-ST elevated myocardial infarction) (HCC) (4/25/2016); Prostate cancer ([facility]); Prostate cancer (HCC); RLQ abdominal pain (7/1/2016); and Tachycardia (4/23/2016). He also has no past medical history of GERD (gastroesophageal reflux disease); Thyroid disease; Tremor, essential; Ulcer; or Urinary tract infection, site not specified.    I personally reviewed patient's problem list, allergies, medications, family hx, social hx with patient and update EPIC.     REVIEW OF SYSTEMS:  CONSTITUTIONAL:  Denies night sweats, fatigue, malaise, lethargy, fever or chills.  RESPIRATORY:  Denies cough, wheeze, hemoptysis, or shortness of breath.  CARDIOVASCULAR:  Denies chest pains, palpitations, pedal edema     Objective:     Blood pressure 118/72, pulse 62, temperature 36.6 °C (97.9 °F), resp. rate 16, height 1.674 m (5' 5.91\"), weight 84.8 kg (187 lb), SpO2 96 %. Body mass index is 30.27 kg/m².    Physical Exam:  Constitutional: awake, alert, in no distress.  Skin: Warm, dry, good turgor, no rashes, bruises, ulcers in visible areas.  -Surgical site appears to clean, negative bleeding/discharge, mild surrounding erythema, sutures are intact.  Neck: Trachea midline, no masses, no thyromegaly. No cervical or supraclavicular lymphadenopathy  Respiratory: Unlabored respiratory effort, lungs clear to auscultation, no wheezes, no rales.  Cardiovascular: Normal S1, S2, no murmur, no pedal edema.  Musculoskeletal exam: No visible deformities of the thoracic spines, negative hematoma or concerning lesions.  Soft tissue between the right shoulder " blades and thoracic spines are moderately tender to palpation.  Pain is not reproducible with movement of the right shoulder.  Psych: Oriented x3, affect and mood wnl, intact judgement and insight.       Assessment and Plan:   The following treatment plan was discussed    1. Rhomboid muscle pain, right  History and exam is concerning for chronic right rhomboid muscle pain, likely secondary to overuse injury.  Recommended conservative management with over-the-counter analgesics, activity modification.  Home rehab exercise was provided.  Patient is to follow-up with me as needed if symptoms fail to improve.    2. Visit for suture removal  Patient was seen on August 10, 2018 for surgical excision of concerning lesion on his right forearm, pathologies consistent with epidermal inclusion cyst.  Wound appear to heal well on exam, negative evidence of infection.  Suture was successfully removed by me.  Patient tolerated the procedure well, no immediate complications noted.  Wound care instructions provided.    3. Benign essential tremor  Chronic, mild, does not require treatment at this time.  Will monitor.      Ekaterina Love M.D.      Followup: Return for As needed.    Please note that this dictation was created using voice recognition software. I have made every reasonable attempt to correct obvious errors, but I expect that there are errors of grammar and possibly content that I did not discover before finalizing the note.

## 2018-09-04 ENCOUNTER — TELEPHONE (OUTPATIENT)
Dept: MEDICAL GROUP | Age: 68
End: 2018-09-04

## 2018-09-04 NOTE — TELEPHONE ENCOUNTER
Future Appointments       Provider Department Center    9/6/2018 3:40 PM Ekaterina Love M.D.; [facility]  [place] [address] [address]    9/10/2018 4:40 PM Michael J Bloch, M.D. [facility]      9/27/2018 2:40 PM FELIBERTO Menjivar [facility]                     Left message for patient to call back regarding pre-visit planning. Please transfer call to [phone].

## 2018-09-05 ENCOUNTER — TELEPHONE (OUTPATIENT)
Dept: VASCULAR LAB | Facility: MEDICAL CENTER | Age: 68
End: 2018-09-05

## 2018-09-05 NOTE — TELEPHONE ENCOUNTER
Left message to see if patient would be able to come in at either 10:20am or 10:40am on 9/10 due to Dr. Bloch having to leave early.

## 2018-09-06 ENCOUNTER — OFFICE VISIT (OUTPATIENT)
Dept: MEDICAL GROUP | Age: 68
End: 2018-09-06
Payer: MEDICARE

## 2018-09-06 VITALS
OXYGEN SATURATION: 95 % | TEMPERATURE: 98 F | DIASTOLIC BLOOD PRESSURE: 78 MMHG | HEART RATE: 57 BPM | WEIGHT: 187.4 LBS | HEIGHT: 66 IN | BODY MASS INDEX: 30.12 KG/M2 | SYSTOLIC BLOOD PRESSURE: 140 MMHG

## 2018-09-06 DIAGNOSIS — Z23 FLU VACCINE NEED: ICD-10-CM

## 2018-09-06 DIAGNOSIS — E66.9 OBESITY (BMI 30-39.9): ICD-10-CM

## 2018-09-06 DIAGNOSIS — G25.0 BENIGN ESSENTIAL TREMOR: ICD-10-CM

## 2018-09-06 DIAGNOSIS — I10 ESSENTIAL HYPERTENSION, BENIGN: ICD-10-CM

## 2018-09-06 DIAGNOSIS — Z85.46 PERSONAL HISTORY OF MALIGNANT NEOPLASM OF PROSTATE: ICD-10-CM

## 2018-09-06 DIAGNOSIS — Q25.1 COARCTATION OF AORTA: ICD-10-CM

## 2018-09-06 DIAGNOSIS — Z95.2 S/P AVR (AORTIC VALVE REPLACEMENT): ICD-10-CM

## 2018-09-06 DIAGNOSIS — Z00.00 MEDICARE ANNUAL WELLNESS VISIT, SUBSEQUENT: ICD-10-CM

## 2018-09-06 DIAGNOSIS — E78.2 MIXED HYPERLIPIDEMIA: ICD-10-CM

## 2018-09-06 PROCEDURE — G0439 PPPS, SUBSEQ VISIT: HCPCS | Mod: 25 | Performed by: FAMILY MEDICINE

## 2018-09-06 PROCEDURE — G0008 ADMIN INFLUENZA VIRUS VAC: HCPCS | Performed by: FAMILY MEDICINE

## 2018-09-06 PROCEDURE — 90662 IIV NO PRSV INCREASED AG IM: CPT | Performed by: FAMILY MEDICINE

## 2018-09-06 ASSESSMENT — ENCOUNTER SYMPTOMS: GENERAL WELL-BEING: GOOD

## 2018-09-06 ASSESSMENT — ACTIVITIES OF DAILY LIVING (ADL): BATHING_REQUIRES_ASSISTANCE: 0

## 2018-09-06 ASSESSMENT — PATIENT HEALTH QUESTIONNAIRE - PHQ9
CLINICAL INTERPRETATION OF PHQ2 SCORE: 0
5. POOR APPETITE OR OVEREATING: 0 - NOT AT ALL
SUM OF ALL RESPONSES TO PHQ QUESTIONS 1-9: 1

## 2018-09-06 NOTE — PROGRESS NOTES
Chief Complaint   Patient presents with   • Annual Wellness Visit     SCP       HPI:  Juan is a 68 y.o. here for Medicare Annual Wellness Visit    Patient is doing well, he does not have any specific concerns today.      Patient Active Problem List    Diagnosis Date Noted   • History of cardiac cath, 2015, no CAD 09/08/2018   • Obesity (BMI 30-39.9) 07/27/2018   • Personal history of malignant neoplasm of prostate 07/12/2018   • Benign essential tremor 07/12/2017   • S/P AVR (aortic valve replacement), bioprosthetic, 2016 05/13/2016   • Coarctation of aorta, CTA and ECHO every 2 years, f/u vascular medicine (Dr. Bloch) 04/26/2016   • Mixed hyperlipidemia 02/01/2013   • Essential hypertension, benign 02/01/2013       Current Outpatient Prescriptions   Medication Sig Dispense Refill   • lisinopril (PRINIVIL) 20 MG Tab TAKE ONE TABLET BY MOUTH ONCE DAILY 30 Tab 0   • atorvastatin (LIPITOR) 80 MG tablet Take 1 Tab by mouth every evening. Patient needs a follow up appointment for further refills 90 Tab 2   • metoprolol (LOPRESSOR) 25 MG Tab TAKE ONE TABLET BY MOUTH TWICE DAILY 60 Tab 11   • Omega-3 Fatty Acids (FISH OIL) 1000 MG Cap capsule Take 1,000 mg by mouth every day.     • aspirin (ASA) 81 MG Chew Tab chewable tablet Take 1 Tab by mouth every day. 100 Tab 11   • Cholecalciferol (VITAMIN D) 400 UNIT Tab Take 1,000 Units by mouth every day.     • therapeutic multivitamin-minerals (THERAGRAN-M) TABS Take 1 Tab by mouth every day.       No current facility-administered medications for this visit.         Patient is taking medications as noted in medication list.  Current supplements as per medication list.     Allergies: Zoloft    Current social contact/activities: , lives with wife, goes to "[employer] occasionally for fun    Is patient current with immunizations? No, due for FLU. Patient is interested in receiving NONE today.    He  reports that he quit smoking about 10 years ago. His smoking use included  Cigarettes. He has a 7.50 pack-year smoking history. He has never used smokeless tobacco. He reports that he drinks about 8.4 oz of alcohol per week . He reports that he does not use drugs.  Counseling given: No        DPA/Advanced directive: Patient does not have an Advanced Directive.  A packet and workshop information was given on Advanced Directives.    ROS:    Gait: Uses no assistive device   Ostomy: No   Other tubes: No   Amputations: No   Chronic oxygen use No   Last eye exam 2016   Wears hearing aids: No   : Denies any urinary leakage during the last 6 months    Screening:    Depression Screening    Little interest or pleasure in doing things?  0 - not at all  Feeling down, depressed, or hopeless? 0 - not at all  Patient Health Questionnaire Score: 0    No depressive symptoms     Screening for Cognitive Impairment    Three Minute Recall (leader, season, table)  3/3    Alberto clock face with all 12 numbers and set the hands to show 10 past 11.  Yes    No cognitive concerns identified    Fall Risk Assessment    Has the patient had two or more falls in the last year or any fall with injury in the last year?  No  No fall risk identified    Safety Assessment    Throw rugs on floor.  Yes  Handrails on all stairs.  Yes  Good lighting in all hallways.  Yes  Difficulty hearing.  No  Patient counseled about all safety risks that were identified.    Functional Assessment ADLs    Are there any barriers preventing you from cooking for yourself or meeting nutritional needs?  No.    Are there any barriers preventing you from driving safely or obtaining transportation?  No.    Are there any barriers preventing you from using a telephone or calling for help?  No.    Are there any barriers preventing you from shopping?  No.    Are there any barriers preventing you from taking care of your own finances?  No.    Are there any barriers preventing you from managing your medications?  No.    Are there any barriers preventing you  from showering, bathing or dressing yourself?  No.    Are you currently engaging in any exercise or physical activity?  Yes.  Walk dogs  What is your perception of your health?  Good.    Health Maintenance Summary                IMM ZOSTER VACCINES Overdue 12/21/2014      Done 10/26/2014 Imm Admin: Zoster Vaccine Live (ZVL) (Zostavax)    Annual Wellness Visit Overdue 7/13/2018      Done 7/12/2017 Visit Dx: Medicare annual wellness visit, initial    IMM INFLUENZA Overdue 9/1/2018      Done 10/26/2014 Imm Admin: Influenza (IM) Preservative Free    COLOGUARD STOOL DNA Next Due 8/26/2021      Done 8/26/2018 COLOGUARD COLON CANCER SCREENING    IMM DTaP/Tdap/Td Vaccine Next Due 9/6/2025      Done 9/6/2015 Imm Admin: Tdap Vaccine          Patient Care Team:  Ekaterina Love M.D. as PCP - General (Family Medicine)  Michoacano Pantoja M.D. as Consulting Physician (Cardiology)  Bora Shook M.D. as Consulting Physician (Urology)  Michael J Bloch, M.D. as Consulting Physician (Cardiology)  Mehdi Anderson M.D. (Radiation Oncology)  [facility] as Consulting Physician    Social History   Substance Use Topics   • Smoking status: Former Smoker     Packs/day: 0.50     Years: 15.00     Types: Cigarettes     Quit date: 4/11/2008   • Smokeless tobacco: Never Used   • Alcohol use 8.4 oz/week     14 Cans of beer per week      Comment: 2-3 servings per day     Family History   Problem Relation Age of Onset   • Lung Disease Mother         Severe, chronic bronchitis   • Hyperlipidemia Mother    • GI Mother         colon polyps   • Other Mother         heart valve/migraine   • Psychiatry Father         Depression   • Hyperlipidemia Brother    • Stroke Maternal Grandmother 55   • GI Brother         colon polyps   • Stroke Maternal Aunt 55   • Cancer Neg Hx      He  has a past medical history of Anxiety; Aortic coarctation; Aortic valve stenosis; Chest pain (4/23/2016); Depression; Headache(784.0); Heart murmur;  "Hyperlipidemia; Hypertension; Migraine; Muscle, jerky movements (uncontrolled) (1974); Nodular hyperplasia of prostate gland (4/11/2011); NSTEMI (non-ST elevated myocardial infarction) (HCC) (4/25/2016); Prostate cancer (HCC); Prostate cancer (HCC); RLQ abdominal pain (7/1/2016); and Tachycardia (4/23/2016). He also has no past medical history of GERD (gastroesophageal reflux disease); Thyroid disease; Tremor, essential; Ulcer; or Urinary tract infection, site not specified.   Past Surgical History:   Procedure Laterality Date   • AORTIC VALVE REPLACEMENT  4/28/2016    Procedure: AORTIC VALVE REPLACEMENT WITH JENNIFER;  Surgeon: Guero Bradford M.D.;  Location: SURGERY [facility];  Service:    • HERNIA REPAIR      Umbilical   • OTHER      prostate surgery       Exam:     Blood pressure 140/78, pulse (!) 57, temperature 36.7 °C (98 °F), height 1.676 m (5' 6\"), weight 85 kg (187 lb 6.4 oz), SpO2 95 %. Body mass index is 30.25 kg/m².    Hearing excellent.    Dentition good  Alert, oriented in no acute distress.  Eye contact is good, speech goal directed, affect calm      Assessment and Plan. The following treatment and monitoring plan is recommended:      1. Essential hypertension, benign  Chronic, well controlled with lisinopril, Lopressor, will continue both medication at current dosages.  - Pt was counseled on dietary modification, weight loss, smoking cessation, and avoidance of excessive alcohol consumption.    - Recommended moderate intensity exercise at least 30 minutes per day x 5 days per week.     2. Mixed hyperlipidemia  Chronic, well controlled with Lipitor 80 mg daily, will continue.    3. Personal history of malignant neoplasm of prostate  Diagnosed in 2011, status post prostatectomy in the same year.  Patient was found to have elevated PSA in 2017.  He received salvage radiation and IM Lupron by urology. He continues to follow-up regularly with urology.  He feeling well,  denies any active urinary or " systemic symptoms.    4. History of non-ST elevation myocardial infarction (NSTEMI)  Cardiac cath done in 2015 was negative for CAD.  However, patient was found to have coarctation with large post thenar aneurysm.  Patient is to follow-up with Dr. Bloch and Dr. Pantoja.     5. S/P AVR (aortic valve replacement)  6. History of heart valve replacement with bioprosthetic valve [Z95.4]  In 2016, doing well, has consistent f/u with Dr. Pantoja.    7. Coarctation of aorta, CTA and ECHO every 2 years, f/u vascular medicine (Dr. Bloch)  Chronic, asymptomatic, follow-up with Dr. Bloch, CTA done in March 2018 demonstrated no changes in coarctation.  Plans: Follow-up with Dr. Bloch as directed    8. Obesity (BMI 30-39.9)  - Patient was counseled on dietary modification and exercises. Topic includes: portion control, restricted calories to less than 7360-4815 daily, low-carb/low-fat diet, healthy eating habits, food choices, eating pattern, exercises for weight loss and to promote physical and mental health.      9. Benign essential tremor  Chronic, mild, only involves the right arm currently, does not require treatment.  We will continue to monitor.    10. Flu vaccine need  - INFLUENZA VACCINE, HIGH DOSE (65+ ONLY)    11. Medicare annual wellness visit, subsequent  - [facility]    Services suggested: No services needed at this time  Health Care Screening recommendations as per orders if indicated.  Referrals offered: PT/OT/Nutrition counseling/Behavioral Health/Smoking cessation as per orders if indicated.    Discussion today about general wellness and lifestyle habits:    · Prevent falls and reduce trip hazards; Cautioned about securing or removing rugs.  · Have a working fire alarm and carbon monoxide detector;   · Engage in regular physical activity and social activities       Follow-up: Return in about 6 months (around 3/6/2019) for Multiple issues.

## 2018-09-08 PROBLEM — Z98.890 HISTORY OF CARDIAC CATH: Status: ACTIVE | Noted: 2018-09-08

## 2018-09-08 PROBLEM — I25.2 HISTORY OF NON-ST ELEVATION MYOCARDIAL INFARCTION (NSTEMI): Status: RESOLVED | Noted: 2017-07-19 | Resolved: 2018-09-08

## 2018-09-10 ENCOUNTER — OFFICE VISIT (OUTPATIENT)
Dept: VASCULAR LAB | Facility: MEDICAL CENTER | Age: 68
End: 2018-09-10
Attending: INTERNAL MEDICINE
Payer: MEDICARE

## 2018-09-10 VITALS
WEIGHT: 188 LBS | BODY MASS INDEX: 30.22 KG/M2 | HEIGHT: 66 IN | DIASTOLIC BLOOD PRESSURE: 74 MMHG | HEART RATE: 51 BPM | SYSTOLIC BLOOD PRESSURE: 126 MMHG

## 2018-09-10 DIAGNOSIS — Z95.2 S/P AVR (AORTIC VALVE REPLACEMENT): ICD-10-CM

## 2018-09-10 DIAGNOSIS — I10 ESSENTIAL HYPERTENSION: ICD-10-CM

## 2018-09-10 DIAGNOSIS — Q25.1 COARCTATION OF AORTA: ICD-10-CM

## 2018-09-10 DIAGNOSIS — E78.5 DYSLIPIDEMIA: ICD-10-CM

## 2018-09-10 PROCEDURE — 99214 OFFICE O/P EST MOD 30 MIN: CPT | Performed by: INTERNAL MEDICINE

## 2018-09-10 ASSESSMENT — ENCOUNTER SYMPTOMS
NERVOUS/ANXIOUS: 0
COUGH: 0
DEPRESSION: 0
SPEECH CHANGE: 0
FOCAL WEAKNESS: 0
LOSS OF CONSCIOUSNESS: 0
DIZZINESS: 0
PALPITATIONS: 0
WEIGHT LOSS: 0
CLAUDICATION: 0
SENSORY CHANGE: 0
MYALGIAS: 0
HEADACHES: 0
SHORTNESS OF BREATH: 0

## 2018-09-10 NOTE — PROGRESS NOTES
"  Follow Up VASCULAR VISIT  Subjective:   Juan Berumen is a 66 y.o. male who presents today 9-10-18 for   Chief Complaint   Patient presents with   • Amb Vascular Reason For Visit     HPI:  Here for f/u of coarctation, valvular heart disease, hypertension, and dyslipidemia  BP at home usually 120s/70s  Remains on metoprolol and lisinopril  On atorvastatin now (was on pravastatin in past)  No myalgias  No chest pain or other vascular symtoms  On asa  Still on fish oil and vitamin D    History   Smoking Status   • Former Smoker   • Packs/day: 0.50   • Years: 15.00   • Types: Cigarettes   • Quit date: 4/11/2008   Smokeless Tobacco   • Never Used     Allergies   Allergen Reactions   • Zoloft      Increased anxiety.     DIET AND EXERCISE:  Weight Change: up about 10 pounds  Diet: common adult  Exercise: moderate regular exercise program     Review of Systems   Constitutional: Negative for malaise/fatigue and weight loss.   Respiratory: Negative for cough and shortness of breath.    Cardiovascular: Negative for chest pain, palpitations, claudication and leg swelling.   Musculoskeletal: Positive for joint pain. Negative for myalgias.   Neurological: Negative for dizziness, sensory change, speech change, focal weakness, loss of consciousness and headaches.   Psychiatric/Behavioral: Negative for depression. The patient is not nervous/anxious.       Objective:     Vitals:    09/10/18 1106   BP: 126/74   Pulse: (!) 51   Weight: 85.3 kg (188 lb)   Height: 1.676 m (5' 6\")      Body mass index is 30.34 kg/m².  Physical Exam   Constitutional: He is oriented to person, place, and time. No distress.   Cardiovascular: Normal rate, regular rhythm and intact distal pulses.    Murmur heard.  Pulmonary/Chest: Effort normal and breath sounds normal. No respiratory distress. He has no wheezes. He has no rales.   Musculoskeletal: He exhibits no edema or tenderness.   Neurological: He is alert and oriented to person, place, and time. No " cranial nerve deficit. Coordination normal.   Skin: He is not diaphoretic.   Psychiatric: He has a normal mood and affect. His behavior is normal.   Vitals reviewed.    Lab Results   Component Value Date    CHOLSTRLTOT 130 06/29/2018    LDL 80 06/29/2018    HDL 30 (A) 06/29/2018    TRIGLYCERIDE 100 06/29/2018          Lab Results   Component Value Date    SODIUM 138 06/29/2018    POTASSIUM 4.2 06/29/2018    CHLORIDE 107 06/29/2018    CO2 24 06/29/2018    GLUCOSE 104 (H) 06/29/2018    BUN 15 06/29/2018    CREATININE 0.98 06/29/2018    IFAFRICA >60 06/29/2018    IFNOTAFR >60 06/29/2018          CT angiogram April 2016 - focal bandlike narrowing of the descending thoracic aorta without distal flow limitation or compromise. No evidence of aneurysm. Extensive calcification within the aortic valve.    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 Maximum dilation of the aorta proximal to the lesion was 3.2 cm and distal to the lesion was 3.8 cm    Carotid duplex April 2016 - mild plaque bilaterally without significant stenosis. Antegrade flow in both vertebral arteries    Echo september 2017  Prior echo done 06/03/16.  Normal left ventricular systolic function.   Known bioprosthetic aortic valve that is functioning normally with   normal transvalvular gradients. Vmax is 2.62  m/s. Transvalvular   gradients are - Peak: 27 mmHg, Mean: 15 mmHg.  Compared to the images of the prior study done -  there has been no   significant change.     CTA march 2018:  1.  There is no interval change in the focal bandlike area of narrowing in the proximal descending thoracic aorta consistent with history of coarctation.  2.  There is no change in the size of the descending thoracic aorta above or below the area of narrowing.  3.  There has been interval aortic valve  replacement with sternotomy changes.  4.  Decreased size of small mediastinal nodes consistent with inflammatory change.  5.  Interval development of pancreatic ductal dilatation in the distal body and tail with a proximal calcification measuring 4 mm. This could be post inflammatory related to interval pancreatitis. Underlying pancreatic body mass is not excluded.    Medical Decision Making:  Today's Assessment / Status / Plan:     1. S/P AVR (aortic valve replacement)     2. Coarctation of aorta     3. Essential hypertension     4. Dyslipidemia       Patient Type: Secondary Prevention    Etiology of Established CVD if Present:   1.  Aortic coarctation with mild aneurysmal change  2.  Aortic valve disease status post bovine aVR in 2016    Lipid Management: Qualifies for Statin Therapy Based on 2013 ACC/AHA Guidelines: yes  Calculated 10-Year Risk of ASCVD: N/A  Currently on Statin: Yes  Patient with vascular disease, although not atherosclerotic - nonetheless would like to treat aggressively  Improved, although still not optimal, after change from prava to atorva 80  Plan:  - continue atorva 80 daily for now  - repeat lipid panel in 6-12 months  - consider addition of zetia depending on results  - tlc per below    Blood Pressure Management:  Acc/aha bp goal <130/80  BP seems well controlled both in office and at home  - Continue metoprolol  - continue lisinopril  - Continue home blood pressure monitoring    Glycemic Status: Prediabetes  Most recent glucose c/w IFG  - lifestyle mod per below  - continue to follow glucose over time    Anti-Platelet/Anti-Coagulant Tx: yes  Patient has completed 3 month course of anticoagulation after valve replacement   - Continue indefinite aspirin    Smoking: Continue complete avoidance    Physical Activity: Increase frequency and duration but avoid heavy lifting    Weight Management and Nutrition: Dietary plan was discussed with patient at this visit including focus on low sodium  and low simple carbohydrates    Other:     1.  Aortic coarctation, asymptomatic, long-standing, mild aneurysmal dilatation distal to the lesion, appears to have a familial component  - Medical management as above  - Continue surveillance imaging with  CTA/MRA every two years  - again recommend a screening echocardiogram for all first-degree relatives  - Since he does not have a true aortic aneurysm or dissection I think that genetic testing would be of low yield and not indicated at present    2.  Aortic valve disease status post bovine aVR in 2016, associated with aortic coarctation as above. No mention that his valve was bicuspid  - Medical management as above  - echo every 2 years unless cards recommends otherwise  - Recommended screening echocardiogram for all first-degree relatives as above    Will defer future medical management to cardiology a patient request  We'll continue to partner with cardiology and ensuring appropriate surveillance    Instructed to follow-up with PCP for remainder of adult medical needs: yes  We will partner with other providers in the management of established vascular disease and cardiometabolic risk factors.    Studies to Be Obtained:    1.  Echocardiogram sep 2019  2.  CTA thoracic aorta march 2020    Labs to Be Obtained: Per cardiology and pcp    Follow up in: one year    Michael J Bloch, M.D.     Cc:  RHEA Love

## 2018-09-18 DIAGNOSIS — I10 ESSENTIAL HYPERTENSION: ICD-10-CM

## 2018-09-19 RX ORDER — LISINOPRIL 20 MG/1
20 TABLET ORAL DAILY
Qty: 30 TAB | Refills: 0 | Status: SHIPPED | OUTPATIENT
Start: 2018-09-19 | End: 2018-09-27 | Stop reason: SDUPTHER

## 2018-09-27 ENCOUNTER — OFFICE VISIT (OUTPATIENT)
Dept: CARDIOLOGY | Facility: MEDICAL CENTER | Age: 68
End: 2018-09-27
Payer: MEDICARE

## 2018-09-27 VITALS
SYSTOLIC BLOOD PRESSURE: 130 MMHG | HEART RATE: 54 BPM | BODY MASS INDEX: 28.41 KG/M2 | OXYGEN SATURATION: 97 % | HEIGHT: 67 IN | DIASTOLIC BLOOD PRESSURE: 80 MMHG | WEIGHT: 181 LBS

## 2018-09-27 DIAGNOSIS — Z95.2 S/P AVR (AORTIC VALVE REPLACEMENT): ICD-10-CM

## 2018-09-27 DIAGNOSIS — E78.2 MIXED HYPERLIPIDEMIA: ICD-10-CM

## 2018-09-27 DIAGNOSIS — I10 ESSENTIAL HYPERTENSION: ICD-10-CM

## 2018-09-27 DIAGNOSIS — I10 ESSENTIAL HYPERTENSION, BENIGN: ICD-10-CM

## 2018-09-27 PROCEDURE — 99214 OFFICE O/P EST MOD 30 MIN: CPT | Performed by: NURSE PRACTITIONER

## 2018-09-27 RX ORDER — ATORVASTATIN CALCIUM 80 MG/1
80 TABLET, FILM COATED ORAL EVERY EVENING
Qty: 90 TAB | Refills: 3 | Status: SHIPPED | OUTPATIENT
Start: 2018-09-27 | End: 2019-04-09 | Stop reason: SDUPTHER

## 2018-09-27 RX ORDER — UBIDECARENONE 30 MG
100 CAPSULE ORAL DAILY
Qty: 30 CAP | Refills: 11 | Status: SHIPPED | OUTPATIENT
Start: 2018-09-27 | End: 2020-07-20

## 2018-09-27 RX ORDER — LISINOPRIL 20 MG/1
20 TABLET ORAL DAILY
Qty: 90 TAB | Refills: 3 | Status: SHIPPED | OUTPATIENT
Start: 2018-09-27 | End: 2019-04-09 | Stop reason: SDUPTHER

## 2018-09-27 ASSESSMENT — ENCOUNTER SYMPTOMS
WEAKNESS: 0
ORTHOPNEA: 0
PND: 0
ABDOMINAL PAIN: 0
DIZZINESS: 0
CLAUDICATION: 0
PALPITATIONS: 0
SHORTNESS OF BREATH: 0
WEIGHT LOSS: 0
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
## 2018-10-08 ENCOUNTER — PATIENT OUTREACH (OUTPATIENT)
Dept: HEALTH INFORMATION MANAGEMENT | Facility: OTHER | Age: 68
End: 2018-10-08

## 2018-10-08 NOTE — PROGRESS NOTES
Outcome: Unable to contact the office is currently out of the zoster vaccine. No call was made to the pt     Please transfer to Patient Outreach Team at [phone] when patient returns call.    WebIZ Checked & Epic Updated:  yes    [facility] Verified: yes    Attempt # 1 AND FINAL

## 2019-01-22 ENCOUNTER — HOSPITAL ENCOUNTER (OUTPATIENT)
Dept: CARDIOLOGY | Facility: MEDICAL CENTER | Age: 69
End: 2019-01-22
Attending: NURSE PRACTITIONER
Payer: MEDICARE

## 2019-01-22 DIAGNOSIS — Z95.2 S/P AVR (AORTIC VALVE REPLACEMENT): ICD-10-CM

## 2019-01-22 PROCEDURE — 93306 TTE W/DOPPLER COMPLETE: CPT

## 2019-01-22 PROCEDURE — 93306 TTE W/DOPPLER COMPLETE: CPT | Mod: 26 | Performed by: INTERNAL MEDICINE

## 2019-01-23 LAB
LV EJECT FRACT  99904: 60
LV EJECT FRACT MOD 2C 99903: 66.49
LV EJECT FRACT MOD 4C 99902: 70.29
LV EJECT FRACT MOD BP 99901: 64.43

## 2019-01-29 ENCOUNTER — OFFICE VISIT (OUTPATIENT)
Dept: CARDIOLOGY | Facility: MEDICAL CENTER | Age: 69
End: 2019-01-29
Payer: MEDICARE

## 2019-01-29 VITALS
OXYGEN SATURATION: 95 % | HEART RATE: 60 BPM | SYSTOLIC BLOOD PRESSURE: 140 MMHG | HEIGHT: 67 IN | DIASTOLIC BLOOD PRESSURE: 76 MMHG | BODY MASS INDEX: 29.19 KG/M2 | WEIGHT: 186 LBS

## 2019-01-29 DIAGNOSIS — I10 ESSENTIAL HYPERTENSION: ICD-10-CM

## 2019-01-29 DIAGNOSIS — Z85.46 PERSONAL HISTORY OF MALIGNANT NEOPLASM OF PROSTATE: ICD-10-CM

## 2019-01-29 DIAGNOSIS — Z95.2 S/P AVR (AORTIC VALVE REPLACEMENT): ICD-10-CM

## 2019-01-29 DIAGNOSIS — Z98.890 HISTORY OF CARDIAC CATH: ICD-10-CM

## 2019-01-29 DIAGNOSIS — Q25.1 COARCTATION OF AORTA: ICD-10-CM

## 2019-01-29 DIAGNOSIS — E78.2 MIXED HYPERLIPIDEMIA: ICD-10-CM

## 2019-01-29 PROCEDURE — 99214 OFFICE O/P EST MOD 30 MIN: CPT | Performed by: INTERNAL MEDICINE

## 2019-01-29 ASSESSMENT — ENCOUNTER SYMPTOMS
GASTROINTESTINAL NEGATIVE: 1
CLAUDICATION: 0
PND: 0
COUGH: 0
NEUROLOGICAL NEGATIVE: 1
PALPITATIONS: 0
CHILLS: 0
FEVER: 0
WHEEZING: 0
RESPIRATORY NEGATIVE: 1
DIZZINESS: 0
WEAKNESS: 0
SORE THROAT: 0
EYES NEGATIVE: 1
SHORTNESS OF BREATH: 0
MUSCULOSKELETAL NEGATIVE: 1
CARDIOVASCULAR NEGATIVE: 1
SPUTUM PRODUCTION: 0
CONSTITUTIONAL NEGATIVE: 1
LOSS OF CONSCIOUSNESS: 0
ORTHOPNEA: 0
BRUISES/BLEEDS EASILY: 0
HEMOPTYSIS: 0
STRIDOR: 0
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
## 2019-03-04 ENCOUNTER — TELEPHONE (OUTPATIENT)
Dept: MEDICAL GROUP | Age: 69
End: 2019-03-04

## 2019-03-04 NOTE — TELEPHONE ENCOUNTER
ESTABLISHED PATIENT PRE-VISIT PLANNING     Patient was NOT contacted to complete PVP.     Note: Patient will not be contacted if there is no indication to call.     1.  Reviewed notes from the last few office visits within the medical group: Yes    2.  If any orders were placed at last visit or intended to be done for this visit (i.e. 6 mos follow-up), do we have Results/Consult Notes?        •  Labs - Labs were not ordered at last office visit.   Note: If patient appointment is for lab review and patient did not complete labs, check with provider if OK to reschedule patient until labs completed.       •  Imaging - Imaging ordered, completed and results are in chart.       •  Referrals - Referral ordered, patient was seen and consult notes are in chart. Care Teams updated  YES.    3. Is this appointment scheduled as a Hospital Follow-Up? No    4.  Immunizations were updated in Epic using WebIZ?: Epic matches WebIZ       •  Web Iz Recommendations: SHINGRIX (Shingles)    5.  Patient is due for the following Health Maintenance Topics:   Health Maintenance Due   Topic Date Due   • IMM ZOSTER VACCINES (2 of 3) 12/21/2014       - Patient is up-to-date on all Health Maintenance topics. No records have been requested at this time.    6. Orders for overdue Health Maintenance topics pended in Pre-Charting? N\A    7.  AHA (MDX) form printed for Provider? YES    8.  Patient was NOT informed to arrive 15 min prior to their scheduled appointment and bring in their medication bottles.

## 2019-03-15 ENCOUNTER — OFFICE VISIT (OUTPATIENT)
Dept: MEDICAL GROUP | Age: 69
End: 2019-03-15
Payer: MEDICARE

## 2019-03-15 VITALS
OXYGEN SATURATION: 98 % | BODY MASS INDEX: 28.44 KG/M2 | SYSTOLIC BLOOD PRESSURE: 132 MMHG | TEMPERATURE: 97.9 F | WEIGHT: 181.2 LBS | HEART RATE: 50 BPM | HEIGHT: 67 IN | DIASTOLIC BLOOD PRESSURE: 86 MMHG

## 2019-03-15 DIAGNOSIS — B02.9 HERPES ZOSTER WITHOUT COMPLICATION: ICD-10-CM

## 2019-03-15 DIAGNOSIS — E66.9 OBESITY (BMI 30-39.9): ICD-10-CM

## 2019-03-15 DIAGNOSIS — E78.2 MIXED HYPERLIPIDEMIA: ICD-10-CM

## 2019-03-15 DIAGNOSIS — I10 ESSENTIAL HYPERTENSION: Primary | ICD-10-CM

## 2019-03-15 DIAGNOSIS — Z85.46 PERSONAL HISTORY OF MALIGNANT NEOPLASM OF PROSTATE: ICD-10-CM

## 2019-03-15 DIAGNOSIS — I77.819 ECTASIS AORTA (HCC): ICD-10-CM

## 2019-03-15 PROCEDURE — 8041 PR SCP AHA: Performed by: FAMILY MEDICINE

## 2019-03-15 PROCEDURE — 99214 OFFICE O/P EST MOD 30 MIN: CPT | Mod: 25 | Performed by: FAMILY MEDICINE

## 2019-03-15 RX ORDER — VALACYCLOVIR HYDROCHLORIDE 1 G/1
1000 TABLET, FILM COATED ORAL 3 TIMES DAILY
Qty: 21 TAB | Refills: 0 | Status: SHIPPED | OUTPATIENT
Start: 2019-03-15 | End: 2019-03-22

## 2019-03-15 ASSESSMENT — PATIENT HEALTH QUESTIONNAIRE - PHQ9: CLINICAL INTERPRETATION OF PHQ2 SCORE: 0

## 2019-03-15 NOTE — PROGRESS NOTES
Subjective:   CC: HTN f/u     HPI:     Juan Berumen is a 68 y.o. male who is an established patient of the clinic, presents with the following concerns:     Patient complains of acute development of mildly erythematous and blistering rash on his right upper arm and the right side of his back. He states it is a little itchy but not painful. Patient reports he had the shingles vaccine in 2014.    The patient has a history of chronic hypertension, hyperlipidemia, coarctation of aorta, benign essential tremor, and obesity. He is complaint with his medications and denies any side effects from them.     Patient reports history of coarctation of aorta and is status post aortic valve replacement. He continues to follow with cardiology and vascular medicine. He has CTA and ECHO every 2 years. He is not anticoagulated.    Current medicines (including changes today)  Current Outpatient Prescriptions   Medication Sig Dispense Refill   • valacyclovir (VALTREX) 1 GM Tab Take 1 Tab by mouth 3 times a day for 7 days. 21 Tab 0   • atorvastatin (LIPITOR) 80 MG tablet Take 1 Tab by mouth every evening. 90 Tab 3   • lisinopril (PRINIVIL) 20 MG Tab Take 1 Tab by mouth every day. 90 Tab 3   • metoprolol (LOPRESSOR) 25 MG Tab Take 1 Tab by mouth 2 times a day. TWICE DAILY 180 Tab 3   • coenzyme Q-10 100 MG Cap capsule Take 1 Cap by mouth every day. 30 Cap 11   • Omega-3 Fatty Acids (FISH OIL) 1000 MG Cap capsule Take 1,000 mg by mouth every day.     • aspirin (ASA) 81 MG Chew Tab chewable tablet Take 1 Tab by mouth every day. 100 Tab 11   • therapeutic multivitamin-minerals (THERAGRAN-M) TABS Take 1 Tab by mouth every day.       No current facility-administered medications for this visit.      He  has a past medical history of Anxiety; Aortic coarctation; Aortic valve stenosis; Chest pain (4/23/2016); Depression; Headache(784.0); Heart murmur; Hyperlipidemia; Hypertension; Migraine; Muscle, jerky movements (uncontrolled) (1974);  "Nodular hyperplasia of prostate gland (4/11/2011); NSTEMI (non-ST elevated myocardial infarction) (HCC) (4/25/2016); Prostate cancer (HCC); Prostate cancer (HCC); RLQ abdominal pain (7/1/2016); and Tachycardia (4/23/2016). He also has no past medical history of GERD (gastroesophageal reflux disease); Thyroid disease; Tremor, essential; Ulcer; or Urinary tract infection, site not specified.    I personally reviewed patient's problem list, allergies, medications, family hx, social hx with patient and update EPIC.     REVIEW OF SYSTEMS:  CONSTITUTIONAL:  Denies night sweats, fatigue, malaise, lethargy, fever or chills.  RESPIRATORY:  Denies cough, wheeze, hemoptysis, or shortness of breath.  CARDIOVASCULAR:  Denies chest pains, palpitations, pedal edema     Objective:     Blood pressure 132/86, pulse (!) 50, temperature 36.6 °C (97.9 °F), temperature source Temporal, height 1.702 m (5' 7\"), weight 82.2 kg (181 lb 3.2 oz), SpO2 98 %. Body mass index is 28.38 kg/m².    Physical Exam:  Constitutional: awake, alert, in no distress.  Skin: Warm, dry, good turgor, bruises, ulcers in visible areas.  - mildly erythematous, nontender, blistering rash at the right forearm near the elbow.  Eye: conjunctiva clear, lids neg for edema or lesions.  ENMT: TM and auditory canals wnl. Oral and nasal mucosa wnl. Lips without lesions, good dentition, oropharynx clear.  Neck: Trachea midline, no masses, no thyromegaly. No cervical or supraclavicular lymphadenopathy  Respiratory: Unlabored respiratory effort, lungs clear to auscultation, no wheezes, no rales.  Cardiovascular: Normal S1, S2, no murmur, no pedal edema.  Psych: Oriented x3, affect and mood wnl, intact judgement and insight.       Assessment and Plan:   The following treatment plan was discussed    1. Essential hypertension  Chronic, controlled with lisinopril and metoprolol, blood pressure is 132/86 today.  Plans:  -Continue lisinopril and metoprolol at current dosage  - Comp " Metabolic Panel; Future  - MICROALBUMIN CREAT RATIO URINE; Future  - CBC WITH DIFFERENTIAL; Future  - Pt was counseled on dietary modification, weight loss, smoking cessation, and avoidance of excessive alcohol consumption.    - Recommended moderate intensity exercise at least 30 minutes per day x 5 days per week.     2. Mixed hyperlipidemia  Chronic, responding well to Lipitor 80 mg daily, denies any side effect.  -Continue Lipitor 80 mg daily  - Lipid Profile; Future    3. Obesity (BMI 30-39.9)  - Patient was counseled on dietary modification and exercises. Topic includes: portion control, restricted calories to less than 1386-3429 daily, low-carb/low-fat diet, healthy eating habits, food choices, eating pattern, exercises for weight loss and to promote physical and mental health.      4. Ectasis aorta (HCC)  Incidental findings of mild aorta ectasia per ECHO done in 2017. There is no mention of this on most recent ECHO done in 1/2019. I assume that it is stable. Will continue to monitor.     5. Personal history of malignant neoplasm of prostate  Diagnosed in 2011, s/p prostatectomy in 2011. Pt suffered elevated PSA in 2017, status post salvage radiation + Lupron,  F/u consistently with Dr. Shook. PSA has returned to normal per pt's report. He is asymptomatic.   - f/u with urology as directed.     6. Herpes zoster without complication  Exam consistent with shingle outbreak affecting the right arm. Plans:   - valacyclovir (VALTREX) 1 GM Tab; Take 1 Tab by mouth 3 times a day for 7 days.  Dispense: 21 Tab; Refill: 0       Ly ABNER Love M.D.    Followup: Return in about 6 months (around 9/15/2019) for Annual wellness visit.    Please note that this dictation was created using voice recognition software and/or scribes. I have made every reasonable attempt to correct obvious errors, but I expect that there are errors of grammar and possibly content that I did not discover before finalizing the note.     Bridgette WHEELER  Arnav (Angella), am scribing for, and in the presence of, Ekaterina Love M.D.    Electronically signed by: Bridgette José (Angella), 3/15/2019    IEkaterina M.D. personally performed the services described in this documentation, as scribed by Bridgette José in my presence, and it is both accurate and complete.

## 2019-03-27 ENCOUNTER — PATIENT MESSAGE (OUTPATIENT)
Dept: MEDICAL GROUP | Age: 69
End: 2019-03-27

## 2019-03-28 RX ORDER — TRIAMCINOLONE ACETONIDE 1 MG/G
1 OINTMENT TOPICAL 2 TIMES DAILY
Qty: 30 TUBE | Refills: 1 | Status: SHIPPED | OUTPATIENT
Start: 2019-03-28 | End: 2019-09-17

## 2019-04-09 DIAGNOSIS — E78.2 MIXED HYPERLIPIDEMIA: ICD-10-CM

## 2019-04-09 DIAGNOSIS — I10 ESSENTIAL HYPERTENSION: ICD-10-CM

## 2019-04-09 RX ORDER — ATORVASTATIN CALCIUM 80 MG/1
80 TABLET, FILM COATED ORAL EVERY EVENING
Qty: 100 TAB | Refills: 2 | Status: SHIPPED | OUTPATIENT
Start: 2019-04-09 | End: 2019-09-17 | Stop reason: SDUPTHER

## 2019-04-09 RX ORDER — LISINOPRIL 20 MG/1
20 TABLET ORAL DAILY
Qty: 100 TAB | Refills: 3 | Status: SHIPPED | OUTPATIENT
Start: 2019-04-09 | End: 2019-09-17 | Stop reason: SDUPTHER

## 2019-06-27 ENCOUNTER — HOSPITAL ENCOUNTER (OUTPATIENT)
Dept: LAB | Facility: MEDICAL CENTER | Age: 69
End: 2019-06-27
Attending: UROLOGY
Payer: MEDICARE

## 2019-06-27 LAB — PSA SERPL-MCNC: <0.01 NG/ML (ref 0–4)

## 2019-06-27 PROCEDURE — 84153 ASSAY OF PSA TOTAL: CPT

## 2019-06-27 PROCEDURE — 36415 COLL VENOUS BLD VENIPUNCTURE: CPT

## 2019-07-11 ENCOUNTER — TELEPHONE (OUTPATIENT)
Dept: VASCULAR LAB | Facility: MEDICAL CENTER | Age: 69
End: 2019-07-11

## 2019-07-12 NOTE — TELEPHONE ENCOUNTER
Echo reviewed.  Gradient across aortic valve has increased a bit.  Pending further cards recs we will repeat echo in one year.    Michael Bloch, MD  [facility]

## 2019-09-16 SDOH — HEALTH STABILITY: MENTAL HEALTH: HOW OFTEN DO YOU HAVE 6 OR MORE DRINKS ON ONE OCCASION?: WEEKLY

## 2019-09-16 NOTE — PROGRESS NOTES
ANNUAL WELLNESS VISIT PRE-VISIT PLANNING WITHOUT OUTREACH    1.  Reviewed note from last office visit with PCP: YES    2.  If any orders were placed at last visit, do we have Results/Consult Notes?        •  Labs - Labs ordered, but not to be completed until future.  Note: If patient appointment is for lab review and patient did not complete labs, check with provider if OK to reschedule patient until labs completed.       •  Imaging - Imaging was not ordered at last office visit.       •  Referrals - No referrals were ordered at last office visit.    3.  Immunizations were updated in Epic using WebIZ?: Epic matches WebIZ       •  WebIZ Recommendations: FLU and SHINGRIX (Shingles)        •  Is patient due for Tdap? NO       •  Is patient due for Shingrix? YES. Patient was not notified of copay/out of pocket cost.     4.  Patient is due for the following Health Maintenance Topics:   Health Maintenance Due   Topic Date Due   • IMM ZOSTER VACCINES (2 of 3) 12/21/2014   • IMM INFLUENZA (1) 09/01/2019   • Annual Wellness Visit  09/07/2019           5.  Reviewed/Updated the following with patient:       •   Preferred Pharmacy? YES       •   Preferred Lab? YES       •   Preferred Communication? YES       •   Allergies? YES       •   Medications? YES. Was Abstract Encounter opened and chart updated? YES       •   Social History? YES. Was Abstract Encounter opened and chart updated? YES       •   Family History (document living status of immediate family members and if + hx of  cancer, diabetes, hypertension, hyperlipidemia, heart attack, stroke) YES. Was Abstract Encounter opened and chart updated? YES    6.  Care Team Updated:       •   DME Company (gait device, O2, CPAP, etc.): N\A       •   Other Specialists (eye doctor, derm, GYN, cardiology, endo, etc): YES    7. Orders for overdue Health Maintenance topics pended in Pre-Charting? N\A    8.  Patient has the following Care Path diagnoses on Problem List:  NONE    9.   Patient was advised: “This is a free wellness visit. The provider will screen for medical conditions to help you stay healthy. If you have other concerns to address you may be asked to discuss these at a separate visit or there may be an additional fee.”     10.  Patient was informed to arrive 15 min prior to their scheduled appointment and bring in their medication bottles.

## 2019-09-17 ENCOUNTER — OFFICE VISIT (OUTPATIENT)
Dept: MEDICAL GROUP | Age: 69
End: 2019-09-17
Payer: MEDICARE

## 2019-09-17 VITALS
WEIGHT: 173.4 LBS | HEART RATE: 54 BPM | HEIGHT: 67 IN | BODY MASS INDEX: 27.21 KG/M2 | DIASTOLIC BLOOD PRESSURE: 66 MMHG | SYSTOLIC BLOOD PRESSURE: 116 MMHG | TEMPERATURE: 97.1 F | OXYGEN SATURATION: 98 %

## 2019-09-17 DIAGNOSIS — E66.9 OBESITY (BMI 30-39.9): ICD-10-CM

## 2019-09-17 DIAGNOSIS — I10 ESSENTIAL HYPERTENSION, BENIGN: ICD-10-CM

## 2019-09-17 DIAGNOSIS — Z23 NEED FOR VACCINATION: ICD-10-CM

## 2019-09-17 DIAGNOSIS — Q25.1 COARCTATION OF AORTA: ICD-10-CM

## 2019-09-17 DIAGNOSIS — E78.2 MIXED HYPERLIPIDEMIA: ICD-10-CM

## 2019-09-17 DIAGNOSIS — G25.0 BENIGN ESSENTIAL TREMOR: ICD-10-CM

## 2019-09-17 DIAGNOSIS — I10 ESSENTIAL HYPERTENSION: ICD-10-CM

## 2019-09-17 DIAGNOSIS — Z00.00 MEDICARE ANNUAL WELLNESS VISIT, SUBSEQUENT: Primary | ICD-10-CM

## 2019-09-17 DIAGNOSIS — Z85.46 PERSONAL HISTORY OF MALIGNANT NEOPLASM OF PROSTATE: ICD-10-CM

## 2019-09-17 PROCEDURE — G0008 ADMIN INFLUENZA VIRUS VAC: HCPCS | Performed by: FAMILY MEDICINE

## 2019-09-17 PROCEDURE — 90662 IIV NO PRSV INCREASED AG IM: CPT | Performed by: FAMILY MEDICINE

## 2019-09-17 PROCEDURE — G0439 PPPS, SUBSEQ VISIT: HCPCS | Performed by: FAMILY MEDICINE

## 2019-09-17 RX ORDER — ATORVASTATIN CALCIUM 80 MG/1
80 TABLET, FILM COATED ORAL EVERY EVENING
Qty: 100 TAB | Refills: 3 | Status: SHIPPED | OUTPATIENT
Start: 2019-09-17 | End: 2020-10-29

## 2019-09-17 RX ORDER — LISINOPRIL 20 MG/1
20 TABLET ORAL DAILY
Qty: 100 TAB | Refills: 3 | Status: SHIPPED | OUTPATIENT
Start: 2019-09-17 | End: 2020-10-29

## 2019-09-17 ASSESSMENT — ACTIVITIES OF DAILY LIVING (ADL): BATHING_REQUIRES_ASSISTANCE: 0

## 2019-09-17 ASSESSMENT — ENCOUNTER SYMPTOMS: GENERAL WELL-BEING: GOOD

## 2019-09-17 ASSESSMENT — PAIN SCALES - GENERAL: PAINLEVEL: NO PAIN

## 2019-09-17 ASSESSMENT — PATIENT HEALTH QUESTIONNAIRE - PHQ9: CLINICAL INTERPRETATION OF PHQ2 SCORE: 0

## 2019-09-17 NOTE — PROGRESS NOTES
Chief Complaint   Patient presents with   • Annual Wellness Visit     SCP     HPI:  Juan Berumen is a 69 y.o. here for Medicare Annual Wellness Visit     Since his last visit on 3/15/19 he has lost 8 lbs.    Patient has a history of malignant neoplasm of his prostate which was diagnosed in 2011. He underwent a prostatectomy in 2011. In 2017, he was found to have an elevated PSA status post salvage radiation + Lupron. He is currently following Dr. Shook, and undergoing PSA monitoring. His most recent PSA was undetectable at less than 0.01.     Patient has biprosthetic aortic valve.  He also has history of coarctation of aorta.  He is followed by Dr. Pantoja, Cardiology and attends an annual appointment with him every year. He was last seen in January 2019. Pt continues to take aspirin 81 mg, Lipitor 80 mg, Lisinopril 20 mg, and Metoprolol 25 mg BID. He toelrates all medications well, denies any side effects.  Denies active chest pain, shortness of breath, unusual pedal edema, orthopnea.  Patient is recently retired.  Patient has been more active and feels well with overall.    ECHO 1/2019:   Normal left ventricular size, thickness, systolic function, and diastolic function. Left ventricular ejection fraction is visually estimated to be 60%. Normal regional wall motion. Known bioprosthetic aortic valve with normal motion. The valve leaflets appear moderately what is at what is doing calcified. Transvalvular gradients are - Peak:  34mmHg, Mean:  16mmHg. No aortic insufficiency. Structurally normal mitral valve without significant stenosis or regurgitation. Mildly dilated left atrium. Structurally normal tricuspid valve without significant stenosis or regurgitation. Normal inferior vena cava size and inspiratory collapse. Normal right ventricular size and systolic function. Normal pericardium without effusion. Compared to prior study 2017 the gradient across the aortic valve are slightly increased but otherwise  unchanged    Patient has a history of chronic hypertension, hyperlipidemia, benign essential tremor, and obesity. He is taking all his medications as prescribed without any side effects.    Patient agrees to receive his flu shot after the risks and benefits of doing so were discussed.      Patient Active Problem List    Diagnosis Date Noted   • History of cardiac cath, 2015, no CAD 09/08/2018   • Obesity (BMI 30-39.9) 07/27/2018   • Personal history of malignant neoplasm of prostate 07/12/2018   • Benign essential tremor 07/12/2017   • S/P AVR (aortic valve replacement), bioprosthetic, 2016 05/13/2016   • Coarctation of aorta, CTA and ECHO every 2 years, f/u vascular medicine (Dr. Bloch) 04/26/2016   • Mixed hyperlipidemia 02/01/2013   • Essential hypertension 02/01/2013       Current Outpatient Medications   Medication Sig Dispense Refill   • atorvastatin (LIPITOR) 80 MG tablet Take 1 Tab by mouth every evening. 100 Tab 3   • metoprolol (LOPRESSOR) 25 MG Tab Take 1 Tab by mouth 2 times a day. TWICE DAILY 180 Tab 3   • lisinopril (PRINIVIL) 20 MG Tab Take 1 Tab by mouth every day. 100 Tab 3   • coenzyme Q-10 100 MG Cap capsule Take 1 Cap by mouth every day. 30 Cap 11   • Omega-3 Fatty Acids (FISH OIL) 1000 MG Cap capsule Take 1,000 mg by mouth every day.     • aspirin (ASA) 81 MG Chew Tab chewable tablet Take 1 Tab by mouth every day. 100 Tab 11   • therapeutic multivitamin-minerals (THERAGRAN-M) TABS Take 1 Tab by mouth every day.       No current facility-administered medications for this visit.             Current supplements as per medication list.       Allergies: Zoloft    Current social contact/activities: visits with neighbors and goes to [employer] with wife     He  reports that he quit smoking about 11 years ago. His smoking use included cigarettes. He has a 7.50 pack-year smoking history. He has never used smokeless tobacco. He reports that he drinks alcohol. He reports that he does not use drugs.  Counseling  given: Not Answered      DPA/Advanced Directive:  Patient does not have an Advanced Directive.  A packet and workshop information was given on Advanced Directives.    ROS:    Gait: Uses no assistive device  Ostomy: No  Other tubes: No  Amputations: No  Chronic oxygen use: No  Last eye exam: 4/2019  Wears hearing aids: No   : Reports urinary leakage during the last 6 months that has not interfered at all with their daily activities or sleep.    Screening:  Depression Screening    Little interest or pleasure in doing things?  0 - not at all  Feeling down, depressed , or hopeless? 0 - not at all  Patient Health Questionnaire Score: 0     Screening for Cognitive Impairment    Three Minute Recall (village, kitchen, baby) 3/3    Alberto clock face with all 12 numbers and set the hands to show 10 past 10.  Yes      Fall Risk Assessment    Has the patient had two or more falls in the last year or any fall with injury in the last year?  No    Safety Assessment    Throw rugs on floor.  Yes  Handrails on all stairs.  No  Good lighting in all hallways.  Yes  Difficulty hearing.  No  Patient counseled about all safety risks that were identified.    Functional Assessment ADLs    Are there any barriers preventing you from cooking for yourself or meeting nutritional needs?  No.    Are there any barriers preventing you from driving safely or obtaining transportation?  No.    Are there any barriers preventing you from using a telephone or calling for help?  No.    Are there any barriers preventing you from shopping?  No.    Are there any barriers preventing you from taking care of your own finances?  No.    Are there any barriers preventing you from managing your medications?  No.    Are there any barriers preventing you from showering, bathing or dressing yourself?  No.    Are you currently engaging in any exercise or physical activity?  Yes.  Walks dogs twice a day  What is your perception of your health?  Good.      Health  Maintenance Summary                IMM ZOSTER VACCINES Overdue 2014      Done 10/26/2014 Imm Admin: Zoster Vaccine Live (ZVL) (Zostavax)     Patient has more history with this topic...    Annual Wellness Visit Next Due 2020      Done 2019 SUBSEQUENT ANNUAL WELLNESS VISIT-INCLUDES PPPS ()     Patient has more history with this topic...    COLOGUARD STOOL DNA Next Due 2021      Done 2018 COLOGUARD COLON CANCER SCREENING    IMM DTaP/Tdap/Td Vaccine Next Due 2025      Done 2015 Imm Admin: Tdap Vaccine          Patient Care Team:  Ekaterina Love M.D. as PCP - General (Family Medicine)  Michoacano Pantoja M.D. as Consulting Physician (Cardiology)  Bora Shook M.D. as Consulting Physician (Urology)  Michael J Bloch, M.D. as Consulting Physician (Cardiology)  Mehdi Anderson M.D. (Radiation Oncology)  [facility] as Consulting Physician  Ivy Clark        Social History     Tobacco Use   • Smoking status: Former Smoker     Packs/day: 0.50     Years: 15.00     Pack years: 7.50     Types: Cigarettes     Last attempt to quit: 2008     Years since quittin.4   • Smokeless tobacco: Never Used   Substance Use Topics   • Alcohol use: Yes     Alcohol/week: 0.0 oz     Binge frequency: Weekly     Comment: 0   • Drug use: No     Family History   Problem Relation Age of Onset   • Lung Disease Mother         Severe, chronic bronchitis   • Hyperlipidemia Mother    • GI Disease Mother         colon polyps   • Other Mother         heart valve/migraine   • Psychiatric Illness Father         Depression   • Hyperlipidemia Brother    • Stroke Maternal Grandmother 55   • GI Disease Brother         colon polyps   • Stroke Maternal Aunt 55   • Cancer Neg Hx      He  has a past medical history of Anxiety, Aortic coarctation, Aortic valve stenosis, Chest pain (2016), Depression, Headache(784.0), Heart murmur, Hyperlipidemia, Hypertension, Migraine, Muscle, jerky movements  "(uncontrolled) (1974), Nodular hyperplasia of prostate gland (4/11/2011), NSTEMI (non-ST elevated myocardial infarction) (HCC) (4/25/2016), Prostate cancer (HCC), Prostate cancer (HCC), RLQ abdominal pain (7/1/2016), and Tachycardia (4/23/2016). He also has no past medical history of GERD (gastroesophageal reflux disease), Thyroid disease, Tremor, essential, Ulcer, or Urinary tract infection, site not specified.   Past Surgical History:   Procedure Laterality Date   • AORTIC VALVE REPLACEMENT  4/28/2016    Procedure: AORTIC VALVE REPLACEMENT WITH JENNIFER;  Surgeon: Guero Bradford M.D.;  Location: SURGERY [facility];  Service:    • HERNIA REPAIR      Umbilical   • OTHER      prostate surgery       Exam:   /66 (BP Location: Left arm, Patient Position: Sitting, BP Cuff Size: Adult)   Pulse (!) 54   Temp 36.2 °C (97.1 °F)   Ht 1.702 m (5' 7\")   Wt 78.7 kg (173 lb 6.4 oz)   SpO2 98%  Body mass index is 27.16 kg/m².    Hearing excellent.    Dentition good  Alert, oriented in no acute distress.  Eye contact is good, speech goal directed, affect calm    Assessment and Plan. The following treatment and monitoring plan is recommended:      1. Obesity (BMI 30-39.9)  - Patient identified as having weight management issue.  Appropriate orders and counseling provided.     2. Mixed hyperlipidemia  - Lipid Profile; Future  - atorvastatin (LIPITOR) 80 MG tablet; Take 1 Tab by mouth every evening.  Dispense: 100 Tab; Refill: 3  - recommended dietary modification, exercise, and weight loss     3. Essential hypertension  Chronic, controlled with metoprolol 20 mg daily, metoprolol 25 mg twice daily, no side effect reported.  Blood pressure is 116/66 today.  Heart rate is 54.  Patient denies any side effects.  Plans:  - Comp Metabolic Panel; Future  - MICROALBUMIN CREAT RATIO URINE; Future  - CBC WITH DIFFERENTIAL; Future  - metoprolol (LOPRESSOR) 25 MG Tab; Take 1 Tab by mouth 2 times a day. TWICE DAILY  Dispense: 180 Tab; " Refill: 3  - lisinopril (PRINIVIL) 20 MG Tab; Take 1 Tab by mouth every day.  Dispense: 100 Tab; Refill: 3  - recommended dietary modification, exercise, and weight loss    4. Benign essential tremor  Mild, not bothersome, will monitor.    5. Personal history of malignant neoplasm of prostate  Diagnosed in 2011, s/p prostatectomy in 2011. Pt suffered elevated PSA in 2017, status post salvage radiation + Lupron,  F/u Dr. Shook.  Last PSA couple months ago was undetectable.  Patient is doing well.  -Follow-up with urology as directed    6. Coarctation of aorta, CTA and ECHO every 2 years, f/u vascular medicine (Dr. Bloch)  Stable, follow-up with cardiology and vascular medicine    7. Need for vaccination  - INFLUENZA VACCINE, HIGH DOSE (65+ ONLY)    9. Medicare annual wellness visit, subsequent  - Subsequent Annual Wellness Visit - Includes PPPS     Services suggested: No services needed at this time  Health Care Screening: Age-appropriate preventive services recommended by USPTF and ACIP covered by Medicare were discussed today. Services ordered if indicated and agreed upon by the patient.  Referrals offered: Community-based lifestyle interventions to reduce health risks and promote self-management and wellness, fall prevention, nutrition, physical activity, tobacco-use cessation, weight loss, and mental health services as per orders if indicated.    Discussion today about general wellness and lifestyle habits:    · Prevent falls and reduce trip hazards; Cautioned about securing or removing rugs.  · Have a working fire alarm and carbon monoxide detector;   · Engage in regular physical activity and social activities     Follow-up: Return in about 6 months (around 3/17/2020).

## 2019-09-18 ENCOUNTER — HOSPITAL ENCOUNTER (OUTPATIENT)
Dept: LAB | Facility: MEDICAL CENTER | Age: 69
End: 2019-09-18
Attending: FAMILY MEDICINE
Payer: MEDICARE

## 2019-09-18 DIAGNOSIS — E78.2 MIXED HYPERLIPIDEMIA: ICD-10-CM

## 2019-09-18 DIAGNOSIS — I10 ESSENTIAL HYPERTENSION: ICD-10-CM

## 2019-09-18 PROBLEM — I77.819 ECTASIS AORTA (HCC): Status: RESOLVED | Noted: 2019-03-15 | Resolved: 2019-09-18

## 2019-09-18 LAB
ALBUMIN SERPL BCP-MCNC: 4.2 G/DL (ref 3.2–4.9)
ALBUMIN/GLOB SERPL: 1.8 G/DL
ALP SERPL-CCNC: 81 U/L (ref 30–99)
ALT SERPL-CCNC: 25 U/L (ref 2–50)
ANION GAP SERPL CALC-SCNC: 6 MMOL/L (ref 0–11.9)
AST SERPL-CCNC: 20 U/L (ref 12–45)
BASOPHILS # BLD AUTO: 0.6 % (ref 0–1.8)
BASOPHILS # BLD: 0.03 K/UL (ref 0–0.12)
BILIRUB SERPL-MCNC: 0.6 MG/DL (ref 0.1–1.5)
BUN SERPL-MCNC: 13 MG/DL (ref 8–22)
CALCIUM SERPL-MCNC: 9.1 MG/DL (ref 8.5–10.5)
CHLORIDE SERPL-SCNC: 108 MMOL/L (ref 96–112)
CHOLEST SERPL-MCNC: 170 MG/DL (ref 100–199)
CO2 SERPL-SCNC: 27 MMOL/L (ref 20–33)
CREAT SERPL-MCNC: 1.14 MG/DL (ref 0.5–1.4)
CREAT UR-MCNC: 152.9 MG/DL
EOSINOPHIL # BLD AUTO: 0.1 K/UL (ref 0–0.51)
EOSINOPHIL NFR BLD: 2.1 % (ref 0–6.9)
ERYTHROCYTE [DISTWIDTH] IN BLOOD BY AUTOMATED COUNT: 44.8 FL (ref 35.9–50)
FASTING STATUS PATIENT QL REPORTED: NORMAL
GLOBULIN SER CALC-MCNC: 2.4 G/DL (ref 1.9–3.5)
GLUCOSE SERPL-MCNC: 95 MG/DL (ref 65–99)
HCT VFR BLD AUTO: 47.4 % (ref 42–52)
HDLC SERPL-MCNC: 38 MG/DL
HGB BLD-MCNC: 15.3 G/DL (ref 14–18)
IMM GRANULOCYTES # BLD AUTO: 0.01 K/UL (ref 0–0.11)
IMM GRANULOCYTES NFR BLD AUTO: 0.2 % (ref 0–0.9)
LDLC SERPL CALC-MCNC: 115 MG/DL
LYMPHOCYTES # BLD AUTO: 1.28 K/UL (ref 1–4.8)
LYMPHOCYTES NFR BLD: 26.4 % (ref 22–41)
MCH RBC QN AUTO: 30.4 PG (ref 27–33)
MCHC RBC AUTO-ENTMCNC: 32.3 G/DL (ref 33.7–35.3)
MCV RBC AUTO: 94.2 FL (ref 81.4–97.8)
MICROALBUMIN UR-MCNC: 0.9 MG/DL
MICROALBUMIN/CREAT UR: 6 MG/G (ref 0–30)
MONOCYTES # BLD AUTO: 0.41 K/UL (ref 0–0.85)
MONOCYTES NFR BLD AUTO: 8.5 % (ref 0–13.4)
NEUTROPHILS # BLD AUTO: 3.02 K/UL (ref 1.82–7.42)
NEUTROPHILS NFR BLD: 62.2 % (ref 44–72)
NRBC # BLD AUTO: 0 K/UL
NRBC BLD-RTO: 0 /100 WBC
PLATELET # BLD AUTO: 247 K/UL (ref 164–446)
PMV BLD AUTO: 9.4 FL (ref 9–12.9)
POTASSIUM SERPL-SCNC: 4.2 MMOL/L (ref 3.6–5.5)
PROT SERPL-MCNC: 6.6 G/DL (ref 6–8.2)
RBC # BLD AUTO: 5.03 M/UL (ref 4.7–6.1)
SODIUM SERPL-SCNC: 141 MMOL/L (ref 135–145)
TRIGL SERPL-MCNC: 85 MG/DL (ref 0–149)
WBC # BLD AUTO: 4.9 K/UL (ref 4.8–10.8)

## 2019-09-18 PROCEDURE — 36415 COLL VENOUS BLD VENIPUNCTURE: CPT

## 2019-09-18 PROCEDURE — 82043 UR ALBUMIN QUANTITATIVE: CPT

## 2019-09-18 PROCEDURE — 85025 COMPLETE CBC W/AUTO DIFF WBC: CPT

## 2019-09-18 PROCEDURE — 80061 LIPID PANEL: CPT

## 2019-09-18 PROCEDURE — 80053 COMPREHEN METABOLIC PANEL: CPT

## 2019-09-18 PROCEDURE — 82570 ASSAY OF URINE CREATININE: CPT

## 2019-09-23 ENCOUNTER — OFFICE VISIT (OUTPATIENT)
Dept: MEDICAL GROUP | Age: 69
End: 2019-09-23
Payer: MEDICARE

## 2019-09-23 ENCOUNTER — HOSPITAL ENCOUNTER (OUTPATIENT)
Dept: LAB | Facility: MEDICAL CENTER | Age: 69
End: 2019-09-23
Attending: FAMILY MEDICINE
Payer: MEDICARE

## 2019-09-23 VITALS
HEART RATE: 67 BPM | BODY MASS INDEX: 27.31 KG/M2 | SYSTOLIC BLOOD PRESSURE: 120 MMHG | TEMPERATURE: 97 F | DIASTOLIC BLOOD PRESSURE: 68 MMHG | WEIGHT: 174 LBS | HEIGHT: 67 IN | OXYGEN SATURATION: 97 %

## 2019-09-23 DIAGNOSIS — E66.9 OBESITY (BMI 30-39.9): ICD-10-CM

## 2019-09-23 DIAGNOSIS — L98.9 SKIN LESION OF FACE: ICD-10-CM

## 2019-09-23 DIAGNOSIS — I10 ESSENTIAL HYPERTENSION: ICD-10-CM

## 2019-09-23 DIAGNOSIS — E78.2 MIXED HYPERLIPIDEMIA: ICD-10-CM

## 2019-09-23 DIAGNOSIS — D48.5 NEOPLASM OF UNCERTAIN BEHAVIOR OF SKIN: ICD-10-CM

## 2019-09-23 PROCEDURE — 99000 SPECIMEN HANDLING OFFICE-LAB: CPT | Performed by: FAMILY MEDICINE

## 2019-09-23 PROCEDURE — 11441 EXC FACE-MM B9+MARG 0.6-1 CM: CPT | Performed by: FAMILY MEDICINE

## 2019-09-23 PROCEDURE — 99213 OFFICE O/P EST LOW 20 MIN: CPT | Mod: 25 | Performed by: FAMILY MEDICINE

## 2019-09-23 PROCEDURE — 88342 IMHCHEM/IMCYTCHM 1ST ANTB: CPT

## 2019-09-23 PROCEDURE — 88305 TISSUE EXAM BY PATHOLOGIST: CPT | Mod: 59

## 2019-09-23 PROCEDURE — 11622 EXC S/N/H/F/G MAL+MRG 1.1-2: CPT | Performed by: FAMILY MEDICINE

## 2019-09-23 NOTE — PROGRESS NOTES
Subjective:   CC: Skin lesion on right lateral neck and right side of his nose.   HPI:     Juan Berumen is a 69 y.o. male who is an established patient of the clinic, presents with the following concerns:     Pt c/o a dry, rough, hyperpigmented lesion on this R lateral neck. The lesion is enlarging. He has hx of skin cancer and prolonged unprotected sun exposure during his youth. This lesion bled then healed multiple times but does not resolve.     Pt also c/o large verrucous lesion on the right side of his nose. This lesion has been enlarging in size and intermittent tender. He tried cryotherapy in the past w/o relief.    Pt has hx of HTN and HLD. He is taking Lipitor 80 mg daily and Lisinopril 20 mg qd as well as Metoprolol 25 mg BID. He tolerates all medications well, no s/e reported. He has blood tests done on 9/18/2019. The results discussed with patient.     Current medicines (including changes today)  Current Outpatient Medications   Medication Sig Dispense Refill   • atorvastatin (LIPITOR) 80 MG tablet Take 1 Tab by mouth every evening. 100 Tab 3   • metoprolol (LOPRESSOR) 25 MG Tab Take 1 Tab by mouth 2 times a day. TWICE DAILY 180 Tab 3   • lisinopril (PRINIVIL) 20 MG Tab Take 1 Tab by mouth every day. 100 Tab 3   • coenzyme Q-10 100 MG Cap capsule Take 1 Cap by mouth every day. 30 Cap 11   • Omega-3 Fatty Acids (FISH OIL) 1000 MG Cap capsule Take 1,000 mg by mouth every day.     • aspirin (ASA) 81 MG Chew Tab chewable tablet Take 1 Tab by mouth every day. 100 Tab 11   • therapeutic multivitamin-minerals (THERAGRAN-M) TABS Take 1 Tab by mouth every day.       No current facility-administered medications for this visit.      He  has a past medical history of Anxiety, Aortic coarctation, Aortic valve stenosis, Chest pain (4/23/2016), Depression, Headache(784.0), Heart murmur, Hyperlipidemia, Hypertension, Migraine, Muscle, jerky movements (uncontrolled) (1974), Nodular hyperplasia of prostate gland  "(4/11/2011), NSTEMI (non-ST elevated myocardial infarction) (HCC) (4/25/2016), Prostate cancer (HCC), Prostate cancer (HCC), RLQ abdominal pain (7/1/2016), and Tachycardia (4/23/2016). He also has no past medical history of GERD (gastroesophageal reflux disease), Thyroid disease, Tremor, essential, Ulcer, or Urinary tract infection, site not specified.    I personally reviewed patient's problem list, allergies, medications, family hx, social hx with patient and update EPIC.     REVIEW OF SYSTEMS:  CONSTITUTIONAL:  Denies night sweats, fatigue, malaise, lethargy, fever or chills.  RESPIRATORY:  Denies cough, wheeze, hemoptysis, or shortness of breath.  CARDIOVASCULAR:  Denies chest pains, palpitations, pedal edema     Objective:     /68 (BP Location: Right arm, Patient Position: Sitting, BP Cuff Size: Adult)   Pulse 67   Temp 36.1 °C (97 °F) (Temporal)   Ht 1.702 m (5' 7\")   Wt 78.9 kg (174 lb)   SpO2 97%  Body mass index is 27.25 kg/m².    Physical Exam:  Constitutional: awake, alert, in no distress.  Skin:   - 1 x .4 cm dry scally, non tender, hyperpigmented papule at right lateral neck.   - 0.7 cm mildly tender, mildly erythematous, verrucous papule at the right nasal bridge approximetly 1 cm from right eye.   Neck: Trachea midline, no masses, no thyromegaly. No cervical or supraclavicular lymphadenopathy  Respiratory: Unlabored respiratory effort, lungs clear to auscultation, no wheezes, no rales.  Cardiovascular: Normal S1, S2, no murmur, no pedal edema.  Psych: Oriented x3, affect and mood wnl, intact judgement and insight.     Assessment and Plan:   The following treatment plan was discussed    1. Neoplasm of uncertain behavior of skin, right lateral neck  Pt c/o skin lesion on right lateral neck that bled and healed multiple times but does not completely resolved. Hx of skin cancer (?type) and excessive, unprotected sun exposure in his youth.   - excision, see procedure note below.   - Pathology " Specimen; Future    2. Skin lesion of face  Pt c/o enlarging, mildly tender papule at the right side of his nose. Exam was notable for 0.7 cm mildly tender, mildly erythematous, verrucous papule at the right nasal bridge approximetly 1 cm from right eye.   - excision, see procedure note below    3. Mixed hyperlipidemia  Chronic, stable, controlled with Lipitor 80 mg qd, no s/e reported, will continue.   - recommended dietary modification, exercise, and weight loss.      4. Essential hypertension  Chronic, controlled with Lisinopril 20 mg qd and Metoprolol 25 mg BID. /68.   - cont Lisinopril and Metoprolol as directed.   - Pt was counseled on dietary modification, weight loss, smoking cessation, and avoidance of excessive alcohol consumption.    - Recommended moderate intensity exercise at least 30 minutes per day x 5 days per week.      5. Obesity  - Patient identified as having weight management issue.  Appropriate orders and counseling given.     Procedure note: EXCISION OF SUSPICIOUS SKIN LESION   Location: R lateral neck   Risks, benefits, potential complications, and alternative options discussed with patient. Patient consented to the procedure.   Local anesthesia is achieved with 2% Lidocaine w/o EPI.   The lesion was identified, marked, and cleansed thoroughly with Povidone-Iodine sticks x3. The surgical site was prepared and draped in the usual sterile manner. 2-1.5 cm elliptical skin incision was made in a linear fashion. The lesion was dissected and excised using #15 blade.  Hemostasis was achieved with direct pressure. The wound was closed by  interrupted sutures. Patient tolerates the procedure well. No immediate complications noted.    EBL: minimal  Surgical specimen sent to pathology.   Follow-up: Standard post-procedure care is explained and return precautions are given. Patient will return to the clinic in 14 days for suture removal.       Procedure note: EXCISION OF SUSPICIOUS SKIN LESION    Indication: right lateral nose, approx 1 cm from the right eye  Risks, benefits, potential complications, and alternative options discussed with patient. Patient consented to the procedure.   Local anesthesia is achieved with 2% Lidocaine w/o EPI.   The lesion was identified, marked, and cleansed thoroughly with Povidone-Iodine sticks x3. The surgical site was prepared and draped in the usual sterile manner. The lesion was completely excised using Dermo-blade. Hemostasis was achieved with direct pressure. The wound was closed by interrupted sutures. Patient tolerates the procedure well. No immediate complications noted.    EBL: minimal  Surgical specimen sent to pathology.   Follow-up: Standard post-procedure care is explained and return precautions are given. Patient will return to the clinic in 14 days for suture removal.     Ekaterina Love M.D.    Followup: Return in about 2 weeks (around 10/7/2019) for Suture removal.    Please note that this dictation was created using voice recognition software and/or scribes. I have made every reasonable attempt to correct obvious errors, but I expect that there are errors of grammar and possibly content that I did not discover before finalizing the note.     Mary WHEELER (Angella), am scribing for, and in the presence of, Ekaterina Love M.D.    Electronically signed by: Mary Ayala (Angella), 9/23/2019    Ekaterina WHEELER M.D. personally performed the services described in this documentation, as scribed by Mary Ayala in my presence, and it is both accurate and complete.

## 2019-09-24 LAB — PATHOLOGY CONSULT NOTE: NORMAL

## 2019-10-03 ENCOUNTER — TELEPHONE (OUTPATIENT)
Dept: MEDICAL GROUP | Age: 69
End: 2019-10-03

## 2019-10-03 NOTE — TELEPHONE ENCOUNTER
ESTABLISHED PATIENT PRE-VISIT PLANNING     Patient was NOT contacted to complete PVP.     Note: Patient will not be contacted if there is no indication to call.     1.  Reviewed notes from the last few office visits within the medical group: Yes    2.  If any orders were placed at last visit or intended to be done for this visit (i.e. 6 mos follow-up), do we have Results/Consult Notes?        •  Labs - Labs ordered, completed on 9/23/19 and results are in chart.   Note: If patient appointment is for lab review and patient did not complete labs, check with provider if OK to reschedule patient until labs completed.       •  Imaging - Imaging was not ordered at last office visit.       •  Referrals - No referrals were ordered at last office visit.    3. Is this appointment scheduled as a Hospital Follow-Up? No    4.  Immunizations were updated in Epic using WebIZ?: Epic matches WebIZ       •  Web Iz Recommendations: SHINGRIX (Shingles)    5.  Patient is due for the following Health Maintenance Topics:   Health Maintenance Due   Topic Date Due   • IMM ZOSTER VACCINES (2 of 3) 12/01/2014           6. Orders for overdue Health Maintenance topics pended in Pre-Charting? N\A    7.  AHA (MDX) form printed for Provider? No, already completed    8.  Patient was NOT informed to arrive 15 min prior to their scheduled appointment and bring in their medication bottles.

## 2019-10-07 ENCOUNTER — OFFICE VISIT (OUTPATIENT)
Dept: MEDICAL GROUP | Age: 69
End: 2019-10-07
Payer: MEDICARE

## 2019-10-07 VITALS
BODY MASS INDEX: 26.81 KG/M2 | OXYGEN SATURATION: 97 % | DIASTOLIC BLOOD PRESSURE: 86 MMHG | WEIGHT: 170.8 LBS | HEIGHT: 67 IN | SYSTOLIC BLOOD PRESSURE: 120 MMHG | TEMPERATURE: 97.2 F | HEART RATE: 51 BPM | RESPIRATION RATE: 16 BRPM

## 2019-10-07 DIAGNOSIS — I10 ESSENTIAL HYPERTENSION: ICD-10-CM

## 2019-10-07 DIAGNOSIS — Z48.02 VISIT FOR SUTURE REMOVAL: ICD-10-CM

## 2019-10-07 DIAGNOSIS — D09.9 SQUAMOUS CELL CARCINOMA IN SITU: Primary | ICD-10-CM

## 2019-10-07 DIAGNOSIS — D48.5 NEOPLASM OF UNCERTAIN BEHAVIOR OF SKIN: ICD-10-CM

## 2019-10-07 DIAGNOSIS — L82.1 SEBORRHEIC KERATOSIS: ICD-10-CM

## 2019-10-07 DIAGNOSIS — R00.1 BRADYCARDIA: ICD-10-CM

## 2019-10-07 PROCEDURE — 99214 OFFICE O/P EST MOD 30 MIN: CPT | Performed by: FAMILY MEDICINE

## 2019-10-07 RX ORDER — CARVEDILOL 3.12 MG/1
3.12 TABLET ORAL 2 TIMES DAILY WITH MEALS
Qty: 180 TAB | Refills: 1 | Status: SHIPPED | OUTPATIENT
Start: 2019-10-07 | End: 2020-06-18

## 2019-10-07 NOTE — PROGRESS NOTES
Subjective:   CC: skin lesion     HPI:     Juan Berumen is a 69 y.o. male who is an established patient of the clinic, presents with the following concerns:     Patient presents for suture removal following excision of skin lesions on his nose and right neck. Patient had a lesion on his neck which was removed 9/23/19. Pathology report shows squamous cell carcinoma in situ with clear margin. He additionally had a lesion on his nose which resulted to be seborrheic keratosis. He denied any drainage or redness from the wounds. He does have a history of skin cancer and prolonged unprotected sun exposure during his youth. On exam, we noticed a suspicious lesion on his right upper back. Patient is unsure of its original size or color over the past several years.     Pt has hx of HTN Lisinopril 20 mg qd as well as Metoprolol 25 mg BID. He tolerates all medications well, no s/e reported. Today his heart rate is 51 BPM. Blood pressure controlled at 120/86.      Current medicines (including changes today)  Current Outpatient Medications   Medication Sig Dispense Refill   • carvedilol (COREG) 3.125 MG Tab Take 1 Tab by mouth 2 times a day, with meals. 180 Tab 1   • atorvastatin (LIPITOR) 80 MG tablet Take 1 Tab by mouth every evening. 100 Tab 3   • lisinopril (PRINIVIL) 20 MG Tab Take 1 Tab by mouth every day. 100 Tab 3   • coenzyme Q-10 100 MG Cap capsule Take 1 Cap by mouth every day. 30 Cap 11   • Omega-3 Fatty Acids (FISH OIL) 1000 MG Cap capsule Take 1,000 mg by mouth every day.     • aspirin (ASA) 81 MG Chew Tab chewable tablet Take 1 Tab by mouth every day. 100 Tab 11   • therapeutic multivitamin-minerals (THERAGRAN-M) TABS Take 1 Tab by mouth every day.       No current facility-administered medications for this visit.      He  has a past medical history of Anxiety, Aortic coarctation, Aortic valve stenosis, Chest pain (4/23/2016), Depression, Headache(784.0), Heart murmur, Hyperlipidemia, Hypertension, Migraine,  "Muscle, jerky movements (uncontrolled) (1974), Nodular hyperplasia of prostate gland (4/11/2011), NSTEMI (non-ST elevated myocardial infarction) (HCC) (4/25/2016), Prostate cancer (HCC), Prostate cancer (HCC), RLQ abdominal pain (7/1/2016), and Tachycardia (4/23/2016). He also has no past medical history of GERD (gastroesophageal reflux disease), Thyroid disease, Tremor, essential, Ulcer, or Urinary tract infection, site not specified.    I personally reviewed patient's problem list, allergies, medications, family hx, social hx with patient and update EPIC.     REVIEW OF SYSTEMS:  CONSTITUTIONAL:  Denies night sweats, fatigue, malaise, lethargy, fever or chills.  RESPIRATORY:  Denies cough, wheeze, hemoptysis, or shortness of breath.  CARDIOVASCULAR:  Denies chest pains, palpitations, pedal edema     Objective:     /86   Pulse (!) 51   Temp 36.2 °C (97.2 °F) (Temporal)   Resp 16   Ht 1.702 m (5' 7\")   Wt 77.5 kg (170 lb 12.8 oz)   SpO2 97%  Body mass index is 26.75 kg/m².    Physical Exam:  Constitutional: awake, alert, in no distress.  Skin: Warm, dry, good turgor, no rashes, bruises, ulcers in visible areas.  -1.5cm x 1.6cm hyperpigmented patch on right upper back with irregular border and non uniform distribution of color, non tender no erythema  - incision on right lateral neck and right lateral nose are healing appropriately, no bleeding, discharge or wound dehiscence.   Eye: conjunctiva clear, lids neg for edema or lesions.  ENMT: TM and auditory canals wnl. Oral and nasal mucosa wnl. Lips without lesions, good dentition, oropharynx clear.  Neck: Trachea midline, no masses, no thyromegaly. No cervical or supraclavicular lymphadenopathy  Respiratory: Unlabored respiratory effort, lungs clear to auscultation, no wheezes, no rales.  Cardiovascular: Normal S1, S2, no murmur, no pedal edema.  -Bradycardic heart rate  Psych: Oriented x3, affect and mood wnl, intact judgement and insight.       Assessment " and Plan:   The following treatment plan was discussed    1. Seborrheic keratosis  Lesion removed on R lateral nose is consistent with SK.  I discussed pathogenesis of this condition with patient.  Appropriate counseling provided.    2. Squamous cell carcinoma in situ  The lesion removed from patient's right lateral neck was diagnosed with squamous cell carcinoma in situ with clear margins.  I discussed pathogenesis of this condition with patient.  Recommended routine skin examination and avoidance of sun exposure.  Consistent use of sunscreen was also recommended.    3. Neoplasm of uncertain behavior of skin  Exam was notable for hyperpigmented patch on the right upper back with irregular borders and nonuniform distribution of color.  The lesion is not tender.  Given history of skin cancer, will schedule appointment for removal.    4. Visit for suture removal  Suture was removed from incisions from the right lateral neck and right lateral nose.  Patient tolerated the procedure well, no immediate complication noted.    5. Essential hypertension  6. Bradycardia  Patient has history of essential hypertension, currently taking lisinopril 20 mg daily and metoprolol 25 mg twice daily.  He does have persistent bradycardia without symptoms.  I will discontinue metoprolol at this time.  Plans:  - Continue lisinopril 20 mg daily  - start carvedilol (COREG) 3.125 MG Tab; Take 1 Tab by mouth 2 times a day, with meals.  Dispense: 180 Tab; Refill: 1  -We will repeat blood pressure check at follow-up appointment.      Ekaterina Love M.D.    Followup: Return for As needed.    Please note that this dictation was created using voice recognition software and/or scribes. I have made every reasonable attempt to correct obvious errors, but I expect that there are errors of grammar and possibly content that I did not discover before finalizing the note.     I, Jagdeep Nassar (Scribe), am scribing for, and in the presence of, Ekaterina Love  M.D.    Electronically signed by: Jagdeep Nassar (Scribe), 10/7/2019    Ekaterina HWEELER M.D. personally performed the services described in this documentation, as scribed by Jagdeep Nassar in my presence, and it is both accurate and complete.

## 2019-10-11 ENCOUNTER — OFFICE VISIT (OUTPATIENT)
Dept: VASCULAR LAB | Facility: MEDICAL CENTER | Age: 69
End: 2019-10-11
Attending: INTERNAL MEDICINE
Payer: MEDICARE

## 2019-10-11 VITALS
SYSTOLIC BLOOD PRESSURE: 128 MMHG | HEART RATE: 51 BPM | BODY MASS INDEX: 26.53 KG/M2 | WEIGHT: 169 LBS | DIASTOLIC BLOOD PRESSURE: 62 MMHG | HEIGHT: 67 IN

## 2019-10-11 DIAGNOSIS — Q25.1 COARCTATION OF AORTA: ICD-10-CM

## 2019-10-11 DIAGNOSIS — I10 ESSENTIAL HYPERTENSION: ICD-10-CM

## 2019-10-11 DIAGNOSIS — Z95.2 S/P AVR (AORTIC VALVE REPLACEMENT): ICD-10-CM

## 2019-10-11 DIAGNOSIS — E78.2 MIXED HYPERLIPIDEMIA: ICD-10-CM

## 2019-10-11 PROCEDURE — 99214 OFFICE O/P EST MOD 30 MIN: CPT | Performed by: NURSE PRACTITIONER

## 2019-10-11 PROCEDURE — 99212 OFFICE O/P EST SF 10 MIN: CPT | Performed by: NURSE PRACTITIONER

## 2019-10-11 ASSESSMENT — ENCOUNTER SYMPTOMS
DEPRESSION: 0
WEIGHT LOSS: 0
MYALGIAS: 0
LOSS OF CONSCIOUSNESS: 0
HEADACHES: 0
NERVOUS/ANXIOUS: 0
CLAUDICATION: 0
SHORTNESS OF BREATH: 0
COUGH: 0
SENSORY CHANGE: 0
PALPITATIONS: 0
FOCAL WEAKNESS: 0
DIZZINESS: 0
SPEECH CHANGE: 0

## 2019-10-11 NOTE — PROGRESS NOTES
"  Follow Up VASCULAR VISIT  Subjective:   Juan Berumen is a 69 y.o. male who presents today 10/11/19 for   Chief Complaint   Patient presents with   • Follow-Up     HPI:  Here for f/u of coarctation, valvular heart disease, hypertension, and dyslipidemia  BP at home usually 120's/70's  Taking cardedilol instead of metoprolol- changed by PCP due to low HR   Denies fatigue   On atorvastatin 80mg-- had some upper arm and back muscle soreness--   No CP or SOB or palpitations  No LE swelling  On ASA-- no bleeding issues    Social History     Tobacco Use   Smoking Status Former Smoker   • Packs/day: 0.50   • Years: 15.00   • Pack years: 7.50   • Types: Cigarettes   • Last attempt to quit: 2008   • Years since quittin.5   Smokeless Tobacco Never Used     Allergies   Allergen Reactions   • Zoloft      Increased anxiety.     DIET AND EXERCISE:  Weight Change: up about 10 pounds  Diet: common adult  Exercise: moderate regular exercise program     Review of Systems   Constitutional: Negative for malaise/fatigue and weight loss.   Respiratory: Negative for cough and shortness of breath.    Cardiovascular: Negative for chest pain, palpitations, claudication and leg swelling.   Musculoskeletal: Positive for joint pain. Negative for myalgias.   Neurological: Negative for dizziness, sensory change, speech change, focal weakness, loss of consciousness and headaches.   Psychiatric/Behavioral: Negative for depression. The patient is not nervous/anxious.       Objective:     Vitals:    10/11/19 1333   BP: 128/62   BP Location: Left arm   Patient Position: Sitting   BP Cuff Size: Adult   Pulse: (!) 51   Weight: 76.7 kg (169 lb)   Height: 1.702 m (5' 7\")      Body mass index is 26.47 kg/m².  Physical Exam   Constitutional: He is oriented to person, place, and time. No distress.   Cardiovascular: Normal rate, regular rhythm and intact distal pulses.   Murmur heard.  Pulmonary/Chest: Effort normal and breath sounds normal. No " respiratory distress. He has no wheezes. He has no rales.   Musculoskeletal: He exhibits no edema or tenderness.   Neurological: He is alert and oriented to person, place, and time. Coordination normal.   Skin: Skin is warm and dry. He is not diaphoretic.   Psychiatric: He has a normal mood and affect. His behavior is normal.   Vitals reviewed.    Lab Results   Component Value Date    CHOLSTRLTOT 170 09/18/2019     (H) 09/18/2019    HDL 38 (A) 09/18/2019    TRIGLYCERIDE 85 09/18/2019      Lab Results   Component Value Date    SODIUM 141 09/18/2019    POTASSIUM 4.2 09/18/2019    CHLORIDE 108 09/18/2019    CO2 27 09/18/2019    GLUCOSE 95 09/18/2019    BUN 13 09/18/2019    CREATININE 1.14 09/18/2019    IFAFRICA >60 09/18/2019    IFNOTAFR >60 09/18/2019      Lab Results   Component Value Date    WBC 4.9 09/18/2019    RBC 5.03 09/18/2019    HEMOGLOBIN 15.3 09/18/2019    HEMATOCRIT 47.4 09/18/2019    MCV 94.2 09/18/2019    MCH 30.4 09/18/2019    MCHC 32.3 (L) 09/18/2019    MPV 9.4 09/18/2019      CT angiogram April 2016 - focal bandlike narrowing of the descending thoracic aorta without distal flow limitation or compromise. No evidence of aneurysm. Extensive calcification within the aortic valve.    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 Maximum dilation of the aorta proximal to the lesion was 3.2 cm and distal to the lesion was 3.8 cm    Carotid duplex April 2016 - mild plaque bilaterally without significant stenosis. Antegrade flow in both vertebral arteries    Echo september 2017  Prior echo done 06/03/16.  Normal left ventricular systolic function.   Known bioprosthetic aortic valve that is functioning normally with   normal transvalvular gradients. Vmax is 2.62  m/s. Transvalvular   gradients are - Peak: 27 mmHg, Mean: 15  mmHg.  Compared to the images of the prior study done -  there has been no   significant change.     CTA march 2018:  1.  There is no interval change in the focal bandlike area of narrowing in the proximal descending thoracic aorta consistent with history of coarctation.  2.  There is no change in the size of the descending thoracic aorta above or below the area of narrowing.  3.  There has been interval aortic valve replacement with sternotomy changes.  4.  Decreased size of small mediastinal nodes consistent with inflammatory change.  5.  Interval development of pancreatic ductal dilatation in the distal body and tail with a proximal calcification measuring 4 mm. This could be post inflammatory related to interval pancreatitis. Underlying pancreatic body mass is not excluded.    Echo 1/22/19  CONCLUSIONS  Normal left ventricular size, thickness, systolic function, and   diastolic function.  Left ventricular ejection fraction is visually estimated to be 60%.  Normal regional wall motion.  Known bioprosthetic aortic valve with normal motion.  The valve leaflets appear moderately calcified.  Transvalvular gradients are - Peak:  34mmHg, Mean:  16mmHg.  No aortic insufficiency.  Structurally normal mitral valve without significant stenosis or   regurgitation.  Mildly dilated left atrium.  Structurally normal tricuspid valve without significant stenosis or   regurgitation.  Normal inferior vena cava size and inspiratory collapse.  Normal right ventricular size and systolic function.  Normal pericardium without effusion.  Compared to prior study 2017 the gradient across the aortic valve are   slightly increased but otherwise unchanged  Medical Decision Making:  Today's Assessment / Status / Plan:     1. Mixed hyperlipidemia  Lipid Profile   2. Essential hypertension  Comp Metabolic Panel   3. Coarctation of aorta, CTA and ECHO every 2 years, f/u vascular medicine (Dr. Bloch)  REFERRAL TO VASCULAR MEDICINE Reason for  Referral? Established Vascular Disease   4. S/P AVR (aortic valve replacement), bioprosthetic, 2016  REFERRAL TO VASCULAR MEDICINE Reason for Referral? Established Vascular Disease     Patient Type: Secondary Prevention    Etiology of Established CVD if Present:   1.  Aortic coarctation with mild aneurysmal change  2.  Aortic valve disease status post bovine aVR in 2016    Lipid Management: Qualifies for Statin Therapy Based on 2013 ACC/AHA Guidelines: yes  Calculated 10-Year Risk of ASCVD: N/A  Currently on Statin: Yes  Patient with vascular disease, although not atherosclerotic - nonetheless would like to treat aggressively  Improved, after change from prava to atorva 80  Has been taking half tab (40mg) and admits to missing some doses-- recent labs show suboptimal control   Plan:  - Take full tablet of atorva 80mg daily   - Repeat lipid panel in 6-8 weeks  - Consider addition of zetia depending on results  - TLC per below    Blood Pressure Management:  Acc/aha bp goal <130/80  BP seems well controlled both in office and at home  PCP changed metoprolol to carvedilol in favor of lower dose due to HR <50  HR now in 50's  - Continue carvedilol for now  - Monitor HR daily-- if HR <50, call our office  - Continue lisinopril  - Continue home blood pressure monitoring    Glycemic Status: Prediabetes  Most recent glucose c/w IFG  - Lifestyle mod per below  - Continue to follow glucose over time    Anti-Platelet/Anti-Coagulant Tx: yes  Patient has completed 3 month course of anticoagulation after valve replacement   - Continue indefinite aspirin    Smoking: Continue complete avoidance    Physical Activity: Increase frequency and duration but avoid heavy lifting    Weight Management and Nutrition: Dietary plan was discussed with patient at this visit including focus on low sodium and low simple carbohydrates    Other:     1.  Aortic coarctation, asymptomatic, long-standing, mild aneurysmal dilatation distal to the lesion,  appears to have a familial component  - Medical management as above  - Continue surveillance imaging with CTA/MRA every two years  - Again recommend a screening echocardiogram for all first-degree relatives  - Since he does not have a true aortic aneurysm or dissection I think that genetic testing would be of low yield and not indicated at present    2.  Aortic valve disease status post bovine aVR in 2016, associated with aortic coarctation as above. No mention that his valve was bicuspid  - Medical management as above  - Echo every 2 years unless cards recommends otherwise  - Recommended screening echocardiogram for all first-degree relatives as above    Will defer future medical management to cardiology a patient request  We'll continue to partner with cardiology and ensuring appropriate surveillance    Instructed to follow-up with PCP for remainder of adult medical needs: yes  We will partner with other providers in the management of established vascular disease and cardiometabolic risk factors.    Studies to Be Obtained:    1.  Echocardiogram Sep 2020  2.  CTA thoracic aorta March 2020    Labs to Be Obtained: as above    Follow up in: 6 months    MICAELA Joiner     Cc:  RHEA Love

## 2019-10-14 ENCOUNTER — OFFICE VISIT (OUTPATIENT)
Dept: MEDICAL GROUP | Age: 69
End: 2019-10-14
Payer: MEDICARE

## 2019-10-14 ENCOUNTER — HOSPITAL ENCOUNTER (OUTPATIENT)
Dept: LAB | Facility: MEDICAL CENTER | Age: 69
End: 2019-10-14
Attending: FAMILY MEDICINE
Payer: MEDICARE

## 2019-10-14 VITALS
DIASTOLIC BLOOD PRESSURE: 72 MMHG | HEART RATE: 48 BPM | BODY MASS INDEX: 26.53 KG/M2 | OXYGEN SATURATION: 89 % | TEMPERATURE: 97.5 F | HEIGHT: 67 IN | SYSTOLIC BLOOD PRESSURE: 114 MMHG | WEIGHT: 169 LBS

## 2019-10-14 DIAGNOSIS — D48.5 NEOPLASM OF UNCERTAIN BEHAVIOR OF SKIN: ICD-10-CM

## 2019-10-14 PROCEDURE — 88305 TISSUE EXAM BY PATHOLOGIST: CPT

## 2019-10-14 PROCEDURE — 11402 EXC TR-EXT B9+MARG 1.1-2 CM: CPT | Performed by: FAMILY MEDICINE

## 2019-10-14 SDOH — HEALTH STABILITY: MENTAL HEALTH: HOW OFTEN DO YOU HAVE 6 OR MORE DRINKS ON ONE OCCASION?: LESS THAN MONTHLY

## 2019-10-14 ASSESSMENT — PAIN SCALES - GENERAL: PAINLEVEL: NO PAIN
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
## 2019-10-17 ENCOUNTER — APPOINTMENT (OUTPATIENT)
Dept: RADIOLOGY | Facility: MEDICAL CENTER | Age: 69
End: 2019-10-17
Payer: MEDICARE

## 2019-10-17 ENCOUNTER — OFFICE VISIT (OUTPATIENT)
Dept: URGENT CARE | Facility: CLINIC | Age: 69
End: 2019-10-17
Payer: MEDICARE

## 2019-10-17 ENCOUNTER — TELEPHONE (OUTPATIENT)
Dept: CARDIOLOGY | Facility: MEDICAL CENTER | Age: 69
End: 2019-10-17

## 2019-10-17 ENCOUNTER — APPOINTMENT (OUTPATIENT)
Dept: RADIOLOGY | Facility: MEDICAL CENTER | Age: 69
End: 2019-10-17
Attending: EMERGENCY MEDICINE
Payer: MEDICARE

## 2019-10-17 ENCOUNTER — HOSPITAL ENCOUNTER (EMERGENCY)
Facility: MEDICAL CENTER | Age: 69
End: 2019-10-17
Attending: EMERGENCY MEDICINE
Payer: MEDICARE

## 2019-10-17 VITALS
BODY MASS INDEX: 26.57 KG/M2 | OXYGEN SATURATION: 99 % | HEIGHT: 67 IN | RESPIRATION RATE: 18 BRPM | TEMPERATURE: 98.7 F | DIASTOLIC BLOOD PRESSURE: 88 MMHG | WEIGHT: 169.31 LBS | SYSTOLIC BLOOD PRESSURE: 167 MMHG | HEART RATE: 54 BPM

## 2019-10-17 VITALS
RESPIRATION RATE: 14 BRPM | DIASTOLIC BLOOD PRESSURE: 62 MMHG | OXYGEN SATURATION: 96 % | HEART RATE: 68 BPM | TEMPERATURE: 98.3 F | WEIGHT: 168 LBS | BODY MASS INDEX: 26.37 KG/M2 | SYSTOLIC BLOOD PRESSURE: 100 MMHG | HEIGHT: 67 IN

## 2019-10-17 DIAGNOSIS — F41.9 ANXIETY: ICD-10-CM

## 2019-10-17 DIAGNOSIS — R06.00 DYSPNEA, UNSPECIFIED TYPE: ICD-10-CM

## 2019-10-17 DIAGNOSIS — R94.31 ABNORMAL EKG: ICD-10-CM

## 2019-10-17 DIAGNOSIS — R06.02 SHORTNESS OF BREATH: ICD-10-CM

## 2019-10-17 LAB
ALBUMIN SERPL BCP-MCNC: 4.3 G/DL (ref 3.2–4.9)
ALBUMIN/GLOB SERPL: 1.5 G/DL
ALP SERPL-CCNC: 98 U/L (ref 30–99)
ALT SERPL-CCNC: 34 U/L (ref 2–50)
ANION GAP SERPL CALC-SCNC: 13 MMOL/L (ref 0–11.9)
AST SERPL-CCNC: 25 U/L (ref 12–45)
BASOPHILS # BLD AUTO: 0.7 % (ref 0–1.8)
BASOPHILS # BLD: 0.04 K/UL (ref 0–0.12)
BILIRUB SERPL-MCNC: 0.5 MG/DL (ref 0.1–1.5)
BUN SERPL-MCNC: 12 MG/DL (ref 8–22)
CALCIUM SERPL-MCNC: 9.4 MG/DL (ref 8.4–10.2)
CHLORIDE SERPL-SCNC: 104 MMOL/L (ref 96–112)
CO2 SERPL-SCNC: 23 MMOL/L (ref 20–33)
CREAT SERPL-MCNC: 1.06 MG/DL (ref 0.5–1.4)
EKG IMPRESSION: NORMAL
EOSINOPHIL # BLD AUTO: 0.06 K/UL (ref 0–0.51)
EOSINOPHIL NFR BLD: 1 % (ref 0–6.9)
ERYTHROCYTE [DISTWIDTH] IN BLOOD BY AUTOMATED COUNT: 40 FL (ref 35.9–50)
GLOBULIN SER CALC-MCNC: 2.8 G/DL (ref 1.9–3.5)
GLUCOSE SERPL-MCNC: 107 MG/DL (ref 65–99)
HCT VFR BLD AUTO: 46.7 % (ref 42–52)
HGB BLD-MCNC: 16 G/DL (ref 14–18)
IMM GRANULOCYTES # BLD AUTO: 0.01 K/UL (ref 0–0.11)
IMM GRANULOCYTES NFR BLD AUTO: 0.2 % (ref 0–0.9)
LYMPHOCYTES # BLD AUTO: 2.22 K/UL (ref 1–4.8)
LYMPHOCYTES NFR BLD: 37.1 % (ref 22–41)
MCH RBC QN AUTO: 30.6 PG (ref 27–33)
MCHC RBC AUTO-ENTMCNC: 34.3 G/DL (ref 33.7–35.3)
MCV RBC AUTO: 89.3 FL (ref 81.4–97.8)
MONOCYTES # BLD AUTO: 0.52 K/UL (ref 0–0.85)
MONOCYTES NFR BLD AUTO: 8.7 % (ref 0–13.4)
NEUTROPHILS # BLD AUTO: 3.14 K/UL (ref 1.82–7.42)
NEUTROPHILS NFR BLD: 52.3 % (ref 44–72)
NRBC # BLD AUTO: 0 K/UL
NRBC BLD-RTO: 0 /100 WBC
PATHOLOGY CONSULT NOTE: NORMAL
PLATELET # BLD AUTO: 249 K/UL (ref 164–446)
PMV BLD AUTO: 9 FL (ref 9–12.9)
POTASSIUM SERPL-SCNC: 4 MMOL/L (ref 3.6–5.5)
PROT SERPL-MCNC: 7.1 G/DL (ref 6–8.2)
RBC # BLD AUTO: 5.23 M/UL (ref 4.7–6.1)
SODIUM SERPL-SCNC: 140 MMOL/L (ref 135–145)
TROPONIN T SERPL-MCNC: 9 NG/L (ref 6–19)
WBC # BLD AUTO: 6 K/UL (ref 4.8–10.8)

## 2019-10-17 PROCEDURE — 93005 ELECTROCARDIOGRAM TRACING: CPT

## 2019-10-17 PROCEDURE — 99214 OFFICE O/P EST MOD 30 MIN: CPT | Performed by: PHYSICIAN ASSISTANT

## 2019-10-17 PROCEDURE — 80053 COMPREHEN METABOLIC PANEL: CPT

## 2019-10-17 PROCEDURE — 85025 COMPLETE CBC W/AUTO DIFF WBC: CPT

## 2019-10-17 PROCEDURE — 71045 X-RAY EXAM CHEST 1 VIEW: CPT

## 2019-10-17 PROCEDURE — 93005 ELECTROCARDIOGRAM TRACING: CPT | Performed by: EMERGENCY MEDICINE

## 2019-10-17 PROCEDURE — 93000 ELECTROCARDIOGRAM COMPLETE: CPT | Performed by: PHYSICIAN ASSISTANT

## 2019-10-17 PROCEDURE — 99284 EMERGENCY DEPT VISIT MOD MDM: CPT

## 2019-10-17 PROCEDURE — 84484 ASSAY OF TROPONIN QUANT: CPT

## 2019-10-17 ASSESSMENT — ENCOUNTER SYMPTOMS
SPUTUM PRODUCTION: 0
PALPITATIONS: 0
SHORTNESS OF BREATH: 1
TREMORS: 1
WHEEZING: 0
SORE THROAT: 0
FEVER: 0
DIZZINESS: 1
COUGH: 0
HEMOPTYSIS: 0
CHILLS: 0

## 2019-10-17 NOTE — TELEPHONE ENCOUNTER
Pt's wife was warm transferred by . Pt went to urgent care today with SOB, and was told that his EKG was abnormal and he should go to the ER for further evaluation. Pt however does not want to go to the ER, and was wondering if Dr. Pantoja could look at the EKG and advise him. Explained that Dr. Pantoja is out of the office this week, and that if pt was evaluated by provider in urgent care and advised to go to the emergency room, then it would be a good idea to follow through with this.

## 2019-10-17 NOTE — ED TRIAGE NOTES
"Chief Complaint   Patient presents with   • Abnormal EKG     Went to  about an hour ago for possible panic attack last night; had a EKG performed and was told it was abnormal and to come to the ER; denies CP or SOB       /92   Pulse (!) 57   Temp 36.6 °C (97.8 °F) (Temporal)   Resp 18   Ht 1.702 m (5' 7\")   Wt 76.8 kg (169 lb 5 oz)   SpO2 99%   BMI 26.52 kg/m²     EKG in progress    "
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
## 2019-10-17 NOTE — ED NOTES
PT to ED today for evaluation of weakness, confusion, and near syncopal last night after hot bath. Pt states BP, Pulse ox, and HR were normal and symptoms resolved after 10minutes without intervention.
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
## 2019-10-18 ENCOUNTER — PATIENT OUTREACH (OUTPATIENT)
Dept: HEALTH INFORMATION MANAGEMENT | Facility: OTHER | Age: 69
End: 2019-10-18

## 2019-10-18 ENCOUNTER — PATIENT MESSAGE (OUTPATIENT)
Dept: HEALTH INFORMATION MANAGEMENT | Facility: OTHER | Age: 69
End: 2019-10-18

## 2019-10-18 LAB — PATHOLOGY CONSULT NOTE: NORMAL

## 2019-10-18 NOTE — PROGRESS NOTES
completed intro for SCP PA and pt was on ED list, stated he was feeling better and no HF needed.  Adv $90 copayment for each Medicare-covered [place] emergency room visit.             1. [facility] Verified: yes    2. Verify PCP: yes    3. Review and add  to Care Team: yes    5. Reviewed/Updated the following with patient:       •   Communication Preference Obtained? YES  • MyChart Activation: already active       •   E-Mail Address Obtained? YES       •   Appointment Day and Time Preferences? YES       •   Preferred Pharmacy? YES       •   Preferred Lab? YES

## 2019-10-21 ENCOUNTER — TELEPHONE (OUTPATIENT)
Dept: MEDICAL GROUP | Age: 69
End: 2019-10-21

## 2019-10-21 NOTE — TELEPHONE ENCOUNTER
VOICEMAIL  1. Caller Name: Juan Berumen                        Call Back Number: [phone] (home)       2. Message: Patient's wife left voicemail stating that patient is admitted in the hospital for difficulty breathing and had an X-ray and states that patient has bleeding from the heart and wanted to inform PCP.     3. Patient approves office to leave a detailed voicemail/MyChart message: yes

## 2019-10-22 NOTE — TELEPHONE ENCOUNTER
I reviewed patient's chart.  I do not see that he was admitted recently by [facility].  The patient was admitted elsewhere, please contact patient's family to have records sent to your office.  Thank you

## 2019-10-24 NOTE — TELEPHONE ENCOUNTER
Phone Number Called: [phone] (home)       Call outcome: left message for patient to call back regarding message below    Message: Left voicemail for patient wife to call back on behalf of patient.

## 2019-10-28 ENCOUNTER — OFFICE VISIT (OUTPATIENT)
Dept: MEDICAL GROUP | Age: 69
End: 2019-10-28
Payer: MEDICARE

## 2019-10-28 VITALS
HEART RATE: 53 BPM | BODY MASS INDEX: 27.47 KG/M2 | HEIGHT: 67 IN | DIASTOLIC BLOOD PRESSURE: 70 MMHG | WEIGHT: 175 LBS | SYSTOLIC BLOOD PRESSURE: 136 MMHG | TEMPERATURE: 97.2 F | OXYGEN SATURATION: 97 %

## 2019-10-28 DIAGNOSIS — I10 ESSENTIAL HYPERTENSION: ICD-10-CM

## 2019-10-28 DIAGNOSIS — E78.2 MIXED HYPERLIPIDEMIA: ICD-10-CM

## 2019-10-28 DIAGNOSIS — F43.0 ACUTE STRESS DISORDER: ICD-10-CM

## 2019-10-28 DIAGNOSIS — F41.0 PANIC ATTACK: Primary | ICD-10-CM

## 2019-10-28 DIAGNOSIS — Z48.02 VISIT FOR SUTURE REMOVAL: ICD-10-CM

## 2019-10-28 PROCEDURE — 99214 OFFICE O/P EST MOD 30 MIN: CPT | Performed by: FAMILY MEDICINE

## 2019-10-28 RX ORDER — ALPRAZOLAM 0.25 MG/1
0.25 TABLET ORAL
Qty: 30 TAB | Refills: 0 | Status: SHIPPED | OUTPATIENT
Start: 2019-10-28 | End: 2019-11-27

## 2019-10-28 ASSESSMENT — PATIENT HEALTH QUESTIONNAIRE - PHQ9: CLINICAL INTERPRETATION OF PHQ2 SCORE: 0

## 2019-10-28 NOTE — PROGRESS NOTES
Subjective:   CC: Panic attack    HPI:     Juan Berumen is a 69 y.o. male who is an established patient of the clinic, presents with the following concerns:     Patient was recently seen by ED 10/17/19 after having an abnormal EKG while at urgent care. He reports being very stressed, anxious, lightheaded, and short of breath prior to his urgent care visit. His EKG mainly showed RBBB with flipped T waves, but no ST elevation or other signs of ischemia. His troponin was negative and his chest xray was negative. He was discharged from ER on the same day. He was told that he has anxiety attack. He agrees with the diagnosis stating that there has been escalation of social stress at home. He has never had anxiety disorder before. He currently denies anxiety, lightheadedness, shortness of breath, chest pain or any other symptoms.     Patient had a irregular lesion on his back excised by me 10/14/19. Pathology showed just actinic keratosis, no evidence of melanocyte or malignancy. Sutures will be removed today.     Pt has hx of HTN, high cholesterol Lisinopril 20 mg qd as well as lipitor 80 mg qd and carvediol 3.125 mg BID. He tolerates all medications well, no s/e reported. Today his heart rate is 53. Blood pressure controlled at 136/70.    Current medicines (including changes today)  Current Outpatient Medications   Medication Sig Dispense Refill   • ALPRAZolam (XANAX) 0.25 MG Tab Take 1 Tab by mouth 1 time daily as needed for Anxiety for up to 30 days. 30 Tab 0   • carvedilol (COREG) 3.125 MG Tab Take 1 Tab by mouth 2 times a day, with meals. 180 Tab 1   • atorvastatin (LIPITOR) 80 MG tablet Take 1 Tab by mouth every evening. 100 Tab 3   • lisinopril (PRINIVIL) 20 MG Tab Take 1 Tab by mouth every day. 100 Tab 3   • coenzyme Q-10 100 MG Cap capsule Take 1 Cap by mouth every day. 30 Cap 11   • Omega-3 Fatty Acids (FISH OIL) 1000 MG Cap capsule Take 1,000 mg by mouth every day.     • aspirin (ASA) 81 MG Chew Tab  "chewable tablet Take 1 Tab by mouth every day. 100 Tab 11   • therapeutic multivitamin-minerals (THERAGRAN-M) TABS Take 1 Tab by mouth every day.       No current facility-administered medications for this visit.      He  has a past medical history of Anxiety, Aortic coarctation, Aortic valve stenosis, Chest pain (4/23/2016), Depression, Headache(784.0), Heart murmur, Hyperlipidemia, Hypertension, Migraine, Muscle, jerky movements (uncontrolled) (1974), Nodular hyperplasia of prostate gland (4/11/2011), NSTEMI (non-ST elevated myocardial infarction) (HCC) (4/25/2016), Prostate cancer (HCC), Prostate cancer (HCC), RLQ abdominal pain (7/1/2016), and Tachycardia (4/23/2016). He also has no past medical history of GERD (gastroesophageal reflux disease), Thyroid disease, Tremor, essential, Ulcer, or Urinary tract infection, site not specified.    I personally reviewed patient's problem list, allergies, medications, family hx, social hx with patient and update EPIC.     REVIEW OF SYSTEMS:  CONSTITUTIONAL:  Denies night sweats, fatigue, malaise, lethargy, fever or chills.  RESPIRATORY:  Denies cough, wheeze, hemoptysis, or shortness of breath.  CARDIOVASCULAR:  Denies chest pains, palpitations, pedal edema     Objective:     /70   Pulse (!) 53   Temp 36.2 °C (97.2 °F)   Ht 1.702 m (5' 7\")   Wt 79.4 kg (175 lb)   SpO2 97%  Body mass index is 27.41 kg/m².    Physical Exam:  Constitutional: awake, alert, in no distress.  Skin: Warm, dry, good turgor, no rashes, bruises, ulcers in visible areas.  -Well healed incision on R upper back  Eye: conjunctiva clear, lids neg for edema or lesions.  ENMT: TM and auditory canals wnl. Oral and nasal mucosa wnl. Lips without lesions, good dentition, oropharynx clear.  Neck: Trachea midline, no masses, no thyromegaly. No cervical or supraclavicular lymphadenopathy  Respiratory: Unlabored respiratory effort, lungs clear to auscultation, no wheezes, no rales.  Cardiovascular: " Normal S1, S2, no murmur, no pedal edema.  Psych: Oriented x3, affect and mood wnl, intact judgement and insight.       Assessment and Plan:   The following treatment plan was discussed    1. Essential hypertension  Chronic, controlled with carvedilol 3.125 mg twice daily and lisinopril 20 mg daily, no side effect reported, will continue.  - Comp Metabolic Panel; Future  - MICROALBUMIN CREAT RATIO URINE; Future  - CBC WITH DIFFERENTIAL; Future  - Pt was counseled on dietary modification, weight loss, smoking cessation, and avoidance of excessive alcohol consumption.    - Recommended moderate intensity exercise at least 30 minutes per day x 5 days per week.     2. Panic attack  3. Acute stress disorder  Patient was recently seen by ER and was diagnosed with acute panic attack.  Patient has never had anxiety or any mental health disorder in the past.  However, he complains of escalation of social stress leading to panic attack.  I discussed pathogenesis of this condition with patient.  I will prescribe Xanax 0.25 mg to be taken as needed for worsening anxiety.  - ALPRAZolam (XANAX) 0.25 MG Tab; Take 1 Tab by mouth 1 time daily as needed for Anxiety for up to 30 days.  Dispense: 30 Tab; Refill: 0  - Appropriate counseling provided. Topics might include: regular exercise, well-balanced diet, multi-vitamin daily, weight loss, adequate sleep, meditation, stress management, avoidance of excessive consumption of caffeine, alcohol, illicit drugs, tobacco.        4. Visit for suture removal  Patient has a large lesion removed from his right upper back 2 weeks ago.  Pathology report is consistent with pigmented actinic keratosis.  Wound appears to heal well on exam.  Suture were removed during office visit.  Patient tolerated the procedure well, no immediate complication noted.    Ekaterina Love M.D.    Followup: Return for As needed.    Please note that this dictation was created using voice recognition software and/or scribes. I  have made every reasonable attempt to correct obvious errors, but I expect that there are errors of grammar and possibly content that I did not discover before finalizing the note.     I, Jagdeep Nassar (Robinibroselia), am scribing for, and in the presence of, Ekaterina Love M.D.    Electronically signed by: Jagdeep Nassar (Robinibroselia), 10/28/2019    IEkaterina M.D. personally performed the services described in this documentation, as scribed by Jagdeep Nassar in my presence, and it is both accurate and complete.

## 2020-01-02 ENCOUNTER — OFFICE VISIT (OUTPATIENT)
Dept: URGENT CARE | Facility: CLINIC | Age: 70
End: 2020-01-02
Payer: MEDICARE

## 2020-01-02 VITALS
RESPIRATION RATE: 16 BRPM | HEIGHT: 66 IN | BODY MASS INDEX: 27.16 KG/M2 | OXYGEN SATURATION: 96 % | SYSTOLIC BLOOD PRESSURE: 122 MMHG | WEIGHT: 169 LBS | HEART RATE: 60 BPM | TEMPERATURE: 97.9 F | DIASTOLIC BLOOD PRESSURE: 60 MMHG

## 2020-01-02 DIAGNOSIS — J22 LRTI (LOWER RESPIRATORY TRACT INFECTION): ICD-10-CM

## 2020-01-02 PROCEDURE — 99214 OFFICE O/P EST MOD 30 MIN: CPT | Performed by: FAMILY MEDICINE

## 2020-01-02 RX ORDER — BENZONATATE 200 MG/1
200 CAPSULE ORAL 3 TIMES DAILY PRN
Qty: 45 CAP | Refills: 0 | Status: SHIPPED
Start: 2020-01-02 | End: 2020-03-24

## 2020-01-02 RX ORDER — AZITHROMYCIN 250 MG/1
TABLET, FILM COATED ORAL
Qty: 6 TAB | Refills: 0 | Status: SHIPPED
Start: 2020-01-02 | End: 2020-03-24

## 2020-01-02 NOTE — PROGRESS NOTES
CC:  cough        Cough  This is a new problem. The current episode started 1.5 weeks ago. The problem has been gradually worsening. The problem occurs constantly. The cough is productive of sputum. Associated symptoms include ear congestion, ear pain and nasal congestion. Pertinent negatives include no fever, headaches, sweats, weight loss or wheezing. Nothing aggravates the symptoms.  Patient has tried decongestant for the symptoms - minimal relief. There is no history of asthma.     Social History     Tobacco Use   • Smoking status: Former Smoker     Packs/day: 0.50     Years: 15.00     Pack years: 7.50     Types: Cigarettes     Last attempt to quit: 2008     Years since quittin.7   • Smokeless tobacco: Never Used   Substance Use Topics   • Alcohol use: Yes     Alcohol/week: 0.0 oz     Binge frequency: Less than monthly     Comment: 0   • Drug use: No         Current Outpatient Medications on File Prior to Visit   Medication Sig Dispense Refill   • guaiFENesin (MUCINEX PO) Take  by mouth.     • Pseudoeph-Doxylamine-DM-APAP (NYQUIL PO) Take  by mouth.     • carvedilol (COREG) 3.125 MG Tab Take 1 Tab by mouth 2 times a day, with meals. 180 Tab 1   • atorvastatin (LIPITOR) 80 MG tablet Take 1 Tab by mouth every evening. 100 Tab 3   • lisinopril (PRINIVIL) 20 MG Tab Take 1 Tab by mouth every day. 100 Tab 3   • coenzyme Q-10 100 MG Cap capsule Take 1 Cap by mouth every day. 30 Cap 11   • Omega-3 Fatty Acids (FISH OIL) 1000 MG Cap capsule Take 1,000 mg by mouth every day.     • aspirin (ASA) 81 MG Chew Tab chewable tablet Take 1 Tab by mouth every day. 100 Tab 11   • therapeutic multivitamin-minerals (THERAGRAN-M) TABS Take 1 Tab by mouth every day.       No current facility-administered medications on file prior to visit.                 Review of Systems   Constitutional: Negative for fever and weight loss.   HENT: negative for ear pain.    Cardiovascular - denies chest pain or dyspnea  Respiratory:  "Positive for cough.  Cough is productive.  Negative for wheezing.    Neurological: Negative for headaches.   GI - denies nausea, vomiting or diarrhea  Neuro - denies numbness or tingling.            Objective:     /60 (BP Location: Left arm, Patient Position: Sitting, BP Cuff Size: Adult)   Pulse 60   Temp 36.6 °C (97.9 °F) (Temporal)   Resp 16   Ht 1.676 m (5' 6\")   Wt 76.7 kg (169 lb)   SpO2 96%     Physical Exam   Constitutional: patient is oriented to person, place, and time. Patient appears well-developed and well-nourished. No distress.   HENT:   Head: Normocephalic and atraumatic.   Right Ear: External ear normal.   Left Ear: External ear normal.   Nose: Mucosal edema present. Right sinus exhibits no maxillary sinus tenderness. Left sinus exhibits no maxillary sinus tenderness.   Mouth/Throat: Mucous membranes are normal. No oral lesions. Posterior oropharyngeal erythema present. No oropharyngeal exudate or posterior oropharyngeal edema.   Eyes: Conjunctivae and EOM are normal. Pupils are equal, round, and reactive to light. Right eye exhibits no discharge. Left eye exhibits no discharge. No scleral icterus.   Neck: Normal range of motion. Neck supple. No tracheal deviation present.   Cardiovascular: Normal rate, regular rhythm and normal heart sounds.  Exam reveals no friction rub.    Pulmonary/Chest: Effort normal. No respiratory distress. Patient has no wheezes. Patient has rhonchi. Patient has no rales.    Musculoskeletal:  exhibits no edema.   Lymphadenopathy:     Patient has no cervical adenopathy  Neurological: patient is alert and oriented to person, place, and time.   Skin: Skin is warm and dry. No rash noted. No erythema.   Psychiatric: patient  has a normal mood and affect.  behavior is normal.   Nursing note and vitals reviewed.              Assessment/Plan:     1. LRTI (lower respiratory tract infection)     - azithromycin (ZITHROMAX) 250 MG Tab; Take as directed  Dispense: 6 Tab; " Refill: 0  - benzonatate (TESSALON) 200 MG capsule; Take 1 Cap by mouth 3 times a day as needed for Cough.  Dispense: 45 Cap; Refill: 0    Follow up in one week if no improvement, sooner if symptoms worsen.

## 2020-01-27 DIAGNOSIS — F41.1 GAD (GENERALIZED ANXIETY DISORDER): ICD-10-CM

## 2020-01-28 RX ORDER — ALPRAZOLAM 0.25 MG/1
0.25 TABLET ORAL
Qty: 30 TAB | Refills: 0 | OUTPATIENT
Start: 2020-01-28 | End: 2020-02-27

## 2020-01-30 ENCOUNTER — TELEPHONE (OUTPATIENT)
Dept: VASCULAR LAB | Facility: MEDICAL CENTER | Age: 70
End: 2020-01-30

## 2020-01-30 DIAGNOSIS — Q25.1 COARCTATION OF AORTA: ICD-10-CM

## 2020-01-30 DIAGNOSIS — I71.20 THORACIC AORTIC ANEURYSM WITHOUT RUPTURE (HCC): ICD-10-CM

## 2020-01-31 ENCOUNTER — TELEPHONE (OUTPATIENT)
Dept: VASCULAR LAB | Facility: MEDICAL CENTER | Age: 70
End: 2020-01-31

## 2020-01-31 NOTE — TELEPHONE ENCOUNTER
Left vm to inform them that their imaging studies have been ordered for CTaorta. They can call scheduling office to book the apt at [phone] option #2. If they need the test done in another facility they need to contact us at [phone] and leave their name and date of birth with the facility they would like the orders sent to

## 2020-02-26 ENCOUNTER — HOSPITAL ENCOUNTER (OUTPATIENT)
Dept: LAB | Facility: MEDICAL CENTER | Age: 70
End: 2020-02-26
Attending: UROLOGY
Payer: MEDICARE

## 2020-02-26 ENCOUNTER — HOSPITAL ENCOUNTER (OUTPATIENT)
Dept: LAB | Facility: MEDICAL CENTER | Age: 70
End: 2020-02-26
Attending: FAMILY MEDICINE
Payer: MEDICARE

## 2020-02-26 DIAGNOSIS — E78.2 MIXED HYPERLIPIDEMIA: ICD-10-CM

## 2020-02-26 DIAGNOSIS — I10 ESSENTIAL HYPERTENSION: ICD-10-CM

## 2020-02-26 LAB
ALBUMIN SERPL BCP-MCNC: 4.1 G/DL (ref 3.2–4.9)
ALBUMIN/GLOB SERPL: 1.6 G/DL
ALP SERPL-CCNC: 80 U/L (ref 30–99)
ALT SERPL-CCNC: 33 U/L (ref 2–50)
ANION GAP SERPL CALC-SCNC: 4 MMOL/L (ref 0–11.9)
AST SERPL-CCNC: 28 U/L (ref 12–45)
BASOPHILS # BLD AUTO: 0.6 % (ref 0–1.8)
BASOPHILS # BLD: 0.03 K/UL (ref 0–0.12)
BILIRUB SERPL-MCNC: 0.7 MG/DL (ref 0.1–1.5)
BUN SERPL-MCNC: 17 MG/DL (ref 8–22)
CALCIUM SERPL-MCNC: 8.6 MG/DL (ref 8.5–10.5)
CHLORIDE SERPL-SCNC: 105 MMOL/L (ref 96–112)
CHOLEST SERPL-MCNC: 151 MG/DL (ref 100–199)
CO2 SERPL-SCNC: 27 MMOL/L (ref 20–33)
CREAT SERPL-MCNC: 1.12 MG/DL (ref 0.5–1.4)
CREAT UR-MCNC: 211.9 MG/DL
EOSINOPHIL # BLD AUTO: 0.07 K/UL (ref 0–0.51)
EOSINOPHIL NFR BLD: 1.5 % (ref 0–6.9)
ERYTHROCYTE [DISTWIDTH] IN BLOOD BY AUTOMATED COUNT: 43.2 FL (ref 35.9–50)
FASTING STATUS PATIENT QL REPORTED: NORMAL
GLOBULIN SER CALC-MCNC: 2.5 G/DL (ref 1.9–3.5)
GLUCOSE SERPL-MCNC: 110 MG/DL (ref 65–99)
HCT VFR BLD AUTO: 45.9 % (ref 42–52)
HDLC SERPL-MCNC: 32 MG/DL
HGB BLD-MCNC: 15.4 G/DL (ref 14–18)
IMM GRANULOCYTES # BLD AUTO: 0.01 K/UL (ref 0–0.11)
IMM GRANULOCYTES NFR BLD AUTO: 0.2 % (ref 0–0.9)
LDLC SERPL CALC-MCNC: 99 MG/DL
LYMPHOCYTES # BLD AUTO: 1.82 K/UL (ref 1–4.8)
LYMPHOCYTES NFR BLD: 38.8 % (ref 22–41)
MCH RBC QN AUTO: 31.3 PG (ref 27–33)
MCHC RBC AUTO-ENTMCNC: 33.6 G/DL (ref 33.7–35.3)
MCV RBC AUTO: 93.3 FL (ref 81.4–97.8)
MICROALBUMIN UR-MCNC: 1.3 MG/DL
MICROALBUMIN/CREAT UR: 6 MG/G (ref 0–30)
MONOCYTES # BLD AUTO: 0.49 K/UL (ref 0–0.85)
MONOCYTES NFR BLD AUTO: 10.4 % (ref 0–13.4)
NEUTROPHILS # BLD AUTO: 2.27 K/UL (ref 1.82–7.42)
NEUTROPHILS NFR BLD: 48.5 % (ref 44–72)
NRBC # BLD AUTO: 0 K/UL
NRBC BLD-RTO: 0 /100 WBC
PLATELET # BLD AUTO: 261 K/UL (ref 164–446)
PMV BLD AUTO: 9.4 FL (ref 9–12.9)
POTASSIUM SERPL-SCNC: 4.1 MMOL/L (ref 3.6–5.5)
PROT SERPL-MCNC: 6.6 G/DL (ref 6–8.2)
RBC # BLD AUTO: 4.92 M/UL (ref 4.7–6.1)
SODIUM SERPL-SCNC: 136 MMOL/L (ref 135–145)
TRIGL SERPL-MCNC: 101 MG/DL (ref 0–149)
WBC # BLD AUTO: 4.7 K/UL (ref 4.8–10.8)

## 2020-02-26 PROCEDURE — 85025 COMPLETE CBC W/AUTO DIFF WBC: CPT

## 2020-02-26 PROCEDURE — 84153 ASSAY OF PSA TOTAL: CPT

## 2020-02-26 PROCEDURE — 82043 UR ALBUMIN QUANTITATIVE: CPT

## 2020-02-26 PROCEDURE — 80061 LIPID PANEL: CPT

## 2020-02-26 PROCEDURE — 82570 ASSAY OF URINE CREATININE: CPT

## 2020-02-26 PROCEDURE — 80053 COMPREHEN METABOLIC PANEL: CPT

## 2020-02-26 PROCEDURE — 36415 COLL VENOUS BLD VENIPUNCTURE: CPT

## 2020-02-27 LAB — PSA SERPL-MCNC: <0.01 NG/ML (ref 0–4)

## 2020-03-05 ENCOUNTER — TELEPHONE (OUTPATIENT)
Dept: VASCULAR LAB | Facility: MEDICAL CENTER | Age: 70
End: 2020-03-05

## 2020-03-05 NOTE — TELEPHONE ENCOUNTER
Called pt to schedule the CTA of chest. Was not able to schedule the test. He will call scheduling office instead to get It scheduled. KATE Finnegan.

## 2020-03-16 ENCOUNTER — HOSPITAL ENCOUNTER (OUTPATIENT)
Dept: RADIOLOGY | Facility: MEDICAL CENTER | Age: 70
End: 2020-03-16
Attending: NURSE PRACTITIONER
Payer: MEDICARE

## 2020-03-16 ENCOUNTER — TELEPHONE (OUTPATIENT)
Dept: VASCULAR LAB | Facility: MEDICAL CENTER | Age: 70
End: 2020-03-16

## 2020-03-16 DIAGNOSIS — I71.20 THORACIC AORTIC ANEURYSM WITHOUT RUPTURE (HCC): ICD-10-CM

## 2020-03-16 DIAGNOSIS — Q25.1 COARCTATION OF AORTA: ICD-10-CM

## 2020-03-16 PROCEDURE — 71275 CT ANGIOGRAPHY CHEST: CPT

## 2020-03-16 PROCEDURE — 700117 HCHG RX CONTRAST REV CODE 255: Performed by: NURSE PRACTITIONER

## 2020-03-16 RX ADMIN — IOHEXOL 75 ML: 350 INJECTION, SOLUTION INTRAVENOUS at 09:18

## 2020-03-17 ENCOUNTER — TELEPHONE (OUTPATIENT)
Dept: VASCULAR LAB | Facility: MEDICAL CENTER | Age: 70
End: 2020-03-17

## 2020-03-17 NOTE — TELEPHONE ENCOUNTER
CTA reviewed  Coarctation with stenosis and aneurysm appear stable from previous exam    We will repeat CTA in 2 years    Repeat echo later this year as per previous notes    Michael Bloch, MD  Vascular Medicine

## 2020-03-17 NOTE — TELEPHONE ENCOUNTER
Spoke with the pt regarding the CTA thoracic results. Informed that the degree of stenosis and poststenotic dilatation is unchanged from previous.  We will keep an eye on it. Next test due in 2 years. KATE Finnegan.

## 2020-03-24 ENCOUNTER — OFFICE VISIT (OUTPATIENT)
Dept: MEDICAL GROUP | Age: 70
End: 2020-03-24
Payer: MEDICARE

## 2020-03-24 DIAGNOSIS — E78.2 MIXED HYPERLIPIDEMIA: ICD-10-CM

## 2020-03-24 DIAGNOSIS — R73.01 IMPAIRED FASTING BLOOD SUGAR: ICD-10-CM

## 2020-03-24 DIAGNOSIS — Z85.46 PERSONAL HISTORY OF MALIGNANT NEOPLASM OF PROSTATE: ICD-10-CM

## 2020-03-24 DIAGNOSIS — E66.9 OBESITY (BMI 30-39.9): ICD-10-CM

## 2020-03-24 DIAGNOSIS — I10 ESSENTIAL HYPERTENSION: ICD-10-CM

## 2020-03-24 DIAGNOSIS — F41.0 PANIC ATTACK: ICD-10-CM

## 2020-03-24 DIAGNOSIS — Q25.1 COARCTATION OF AORTA: ICD-10-CM

## 2020-03-24 PROCEDURE — 99443 PR PHYSICIAN TELEPHONE EVALUATION 21-30 MIN: CPT | Mod: CR | Performed by: FAMILY MEDICINE

## 2020-03-24 PROCEDURE — 8041 PR SCP AHA: Performed by: FAMILY MEDICINE

## 2020-03-24 RX ORDER — ALPRAZOLAM 0.25 MG/1
0.25 TABLET ORAL
Qty: 30 TAB | Refills: 0 | Status: SHIPPED
Start: 2020-03-24 | End: 2020-04-23

## 2020-03-24 RX ORDER — ALPRAZOLAM 0.25 MG/1
0.25 TABLET ORAL NIGHTLY PRN
Qty: 30 TAB | Refills: 0 | Status: SHIPPED | OUTPATIENT
Start: 2020-03-24 | End: 2020-03-24 | Stop reason: SDUPTHER

## 2020-03-24 NOTE — PROGRESS NOTES
Telephone Appointment Visit   As a means of avoiding spread of COVID-19, this visit is being conducted by telephone. This telephone visit was initiated by the patient and they verbally consented.     Time at start of call: 8:28 AM     Reason for Call:  medication refill      Patient Comments / History:   Patient has history of hypertension for which he continues to take lisinopril 20 mg once daily with good results. He is supposed to take carvedilol 3.125 mg BID. However, he has only been taking it once each night. For his hyperlipidemia, he continues to take atorvastatin 20 mg once daily. He also continues to take the same dosages of CoQ10, fish oil, and aspirin.     Pt has hx of coarctation of aorta, currently working with vascular medicine. he was last seen by vascular medicine on October 2019. CTA in 3/2020 showed the following:     CTA, 3/2020:   Coarctations aorta similar to previous findings.  Interval sternotomy and placement aortic valvular prosthesis. The ascending aorta measures 3.1 x 3.4 cm as compared to 3.3 x 3.6 cm previously.  Hyperexpanded lungs and scattered linear atelectasis. No consolidation or pleural effusions.      Patient has history of elevated blood sugars on prior labs. He had a glucose of 95 in 9/2019. His most recent labs in 2/26/2020 showed glucose of 110. Patient states he has been eating a lot of ice cream lately. However, he notes that he has been walking regularly. 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     Patient has history of intermittent panic attacks. On once such episode that occurred in October 2019, he ended up in the ER. He attributes his panic attacks to increased social stress.  He takes xanax 0.25 mg as needed without side effects. He is requesting for refill of his xanax. No history of drug of alcohol abuse.    Patient's BMI indicates that he is in the obese range. He has been losing a few pounds over the course of the past year. His most recent weight was 165 lbs  which is down 4 lbs compared to January when he was 169 lbs.     Patient has history of prostate cancer which was diagnosed in 2011. He suffered elevated PSA in 2017. He is status post salvage radiation + Lupron. He continues to follow with Dr. Anderson and Dr. Shook at [facility]. Last PSA was normal. Denies any urinary symptoms.    Patient's vital signs taken at home on 3/23 at 7:30 PM showed weight 165 lbs, temperature 97.6 °F, blood pressure 129/56, pulse 82.      Annual Health Assessment Questions:    1.  Are you currently engaging in any exercise or physical activity? Yes    2.  How would you describe your mood or emotional well-being today? good    3.  Have you had any falls in the last year? No    4.  Have you noticed any problems with your balance or had difficulty walking? No    5.  In the last six months have you experienced any leakage of urine? No    6. DPA/Advanced Directive: Patient does not have an Advanced Directive.  A packet and workshop information was given on Advanced Directives.    Labs / Images Reviewed   I personally reviewed patient's blood work done on 2/26/2020 as well as his previous blood tests.    I reviewed CT scan done in 3/2020.     Assessment and Plan:   1. Essential hypertension  Chronic, controlled with Lisinopril 20 mg and Coreg 3.125 mg BID.   No s/e reported, will cont.   - Pt was counseled on lifestyle modification       2. Mixed hyperlipidemia  Chronic, controlled with Lipitor 80 mg qd, no s/e reported, will continue.    - recommended dietary modification, exercise, and weight loss.      3. Coarctation of aorta, Ascending aorta ectasia   Chronic, stable, asymptomatic, working with vascular medicine. Most recent CTA showed stable ascending aorta.   - f/u with vascular medicine  - cont Coreg and Lisinopril for HTN     4. Impaired fasting blood sugar  - Pt was counseled on dietary modification, weight loss   - Recommended moderate intensity exercise at least 30 minutes per  day x 5 days per week.  - Follow up in 6 months.   - HEMOGLOBIN A1C; Future    5. Personal history of malignant neoplasm of prostate  Patient has history of prostate cancer which was diagnosed in 2011. He suffered elevated PSA in 2017. He is status post salvage radiation + Lupron. He continues to follow with Dr. Anderson and Dr. Shook at [facility]. Last PSA was normal. Denies any urinary symptoms.  - f/u with [facility]     6. Obesity (BMI 30-39.9)  - Patient identified as having weight management issue.  Appropriate orders and counseling given.     7. Panic attack  Intermittent panic attacks, likely secondary to increasing social stress. Pt takes Xanax PRN for symptoms. I reviewed [place] , no concerns for abuse. Pt denies hx of drugs/alcohol abuse.   - ALPRAZolam (XANAX) 0.25 MG Tab; Take 1 Tab by mouth 1 time daily as needed for Anxiety for up to 30 days.  Dispense: 30 Tab; Refill: 0    - Risks, benefits, side effects, as well as potential health complications associated with Xanax discussed with patient. Appropriate counseling provided.      Follow-up: Return in about 6 months (around 9/24/2020) for Multiple issues.      Time at end of call: 8:52 AM  Total Time Spent: 28 minutes      Yan WHEELER (Scribe), am scribing for, and in the presence of, Ekaterina Love M.D.    Electronically signed by: Yan Allan (Angella), 3/24/2020    Ekaterina WHEELER M.D. personally performed the services described in this documentation, as scribed by Yan Allan in my presence, and it is both accurate and complete.

## 2020-04-22 ENCOUNTER — OFFICE VISIT (OUTPATIENT)
Dept: VASCULAR LAB | Facility: MEDICAL CENTER | Age: 70
End: 2020-04-22
Attending: INTERNAL MEDICINE
Payer: MEDICARE

## 2020-04-22 DIAGNOSIS — Z95.2 S/P AVR (AORTIC VALVE REPLACEMENT): ICD-10-CM

## 2020-04-22 DIAGNOSIS — I10 ESSENTIAL HYPERTENSION: ICD-10-CM

## 2020-04-22 DIAGNOSIS — E78.2 MIXED HYPERLIPIDEMIA: ICD-10-CM

## 2020-04-22 DIAGNOSIS — Q25.1 COARCTATION OF AORTA: ICD-10-CM

## 2020-04-22 DIAGNOSIS — R73.01 IMPAIRED FASTING GLUCOSE: ICD-10-CM

## 2020-04-22 PROCEDURE — 99213 OFFICE O/P EST LOW 20 MIN: CPT | Mod: 95,CR | Performed by: NURSE PRACTITIONER

## 2020-04-22 ASSESSMENT — ENCOUNTER SYMPTOMS
WEIGHT LOSS: 1
SPEECH CHANGE: 0
BLOOD IN STOOL: 0
CLAUDICATION: 0
BRUISES/BLEEDS EASILY: 1
DIZZINESS: 0
NERVOUS/ANXIOUS: 0
DEPRESSION: 0
FOCAL WEAKNESS: 0
LOSS OF CONSCIOUSNESS: 0
PALPITATIONS: 0
MYALGIAS: 0
COUGH: 0
SENSORY CHANGE: 0
SHORTNESS OF BREATH: 0
HEADACHES: 0

## 2020-04-22 NOTE — PROGRESS NOTES
Follow Up VASCULAR VISIT  Subjective:   Juan Berumen is a 69 y.o. male who presents today 21 for   No chief complaint on file.    Visit completed via Facetime due to restrictions of COVID-19 pandemic.  All issues as below were discussed and addressed by no physical exam was performed unless allowed by visual confirmation on Facetime.  If it was felt that patient should be evalauted in the clinic, there were directed there.  Patient verbally consented to Facetime tele-video visit.     HPI:  Here for f/u of coarctation, valvular heart disease, hypertension, and dyslipidemia  BP at home usually < 120/80  Taking cardedilol instead of metoprolol- changed by PCP due to low HR   Denies fatigue   On atorvastatin 80mg--   No CP or SOB or palpitations  No LE swelling  On ASA-- no bleeding issues  Walking more about 5 miles a day up [place]    Social History     Tobacco Use   Smoking Status Former Smoker   • Packs/day: 0.50   • Years: 15.00   • Pack years: 7.50   • Types: Cigarettes   • Last attempt to quit: 2008   • Years since quittin.0   Smokeless Tobacco Never Used     Allergies   Allergen Reactions   • Zoloft      Increased anxiety.     DIET AND EXERCISE:  Weight Change: up about 10 pounds  Diet: common adult  Exercise: moderate regular exercise program     Review of Systems   Constitutional: Positive for weight loss. Negative for malaise/fatigue.   HENT: Positive for nosebleeds.    Respiratory: Negative for cough and shortness of breath.    Cardiovascular: Positive for chest pain. Negative for palpitations, claudication and leg swelling.   Gastrointestinal: Negative for blood in stool.   Genitourinary: Negative for hematuria.   Musculoskeletal: Negative for joint pain and myalgias.   Neurological: Negative for dizziness, sensory change, speech change, focal weakness, loss of consciousness and headaches.   Endo/Heme/Allergies: Bruises/bleeds easily.   Psychiatric/Behavioral: Negative for depression. The  patient is not nervous/anxious.       Objective:     There were no vitals filed for this visit.   There is no height or weight on file to calculate BMI.  Physical Exam   Constitutional: He is oriented to person, place, and time. He appears well-developed and well-nourished.   Neurological: He is alert and oriented to person, place, and time.   Skin: No pallor.   Psychiatric: He has a normal mood and affect. His behavior is normal.   Vitals reviewed.    Lab Results   Component Value Date    CHOLSTRLTOT 151 02/26/2020    LDL 99 02/26/2020    HDL 32 (A) 02/26/2020    TRIGLYCERIDE 101 02/26/2020      Lab Results   Component Value Date    SODIUM 136 02/26/2020    POTASSIUM 4.1 02/26/2020    CHLORIDE 105 02/26/2020    CO2 27 02/26/2020    GLUCOSE 110 (H) 02/26/2020    BUN 17 02/26/2020    CREATININE 1.12 02/26/2020    IFAFRICA >60 02/26/2020    IFNOTAFR >60 02/26/2020      Lab Results   Component Value Date    WBC 4.7 (L) 02/26/2020    RBC 4.92 02/26/2020    HEMOGLOBIN 15.4 02/26/2020    HEMATOCRIT 45.9 02/26/2020    MCV 93.3 02/26/2020    MCH 31.3 02/26/2020    MCHC 33.6 (L) 02/26/2020    MPV 9.4 02/26/2020      CT angiogram April 2016 - focal bandlike narrowing of the descending thoracic aorta without distal flow limitation or compromise. No evidence of aneurysm. Extensive calcification within the aortic valve.    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 Maximum dilation of the aorta proximal to the lesion was 3.2 cm and distal to the lesion was 3.8 cm    Carotid duplex April 2016 - mild plaque bilaterally without significant stenosis. Antegrade flow in both vertebral arteries    Echo september 2017  Prior echo done 06/03/16.  Normal left ventricular systolic function.   Known bioprosthetic aortic valve that is functioning normally with   normal  transvalvular gradients. Vmax is 2.62  m/s. Transvalvular   gradients are - Peak: 27 mmHg, Mean: 15 mmHg.  Compared to the images of the prior study done -  there has been no   significant change.     CTA march 2018:  1.  There is no interval change in the focal bandlike area of narrowing in the proximal descending thoracic aorta consistent with history of coarctation.  2.  There is no change in the size of the descending thoracic aorta above or below the area of narrowing.  3.  There has been interval aortic valve replacement with sternotomy changes.  4.  Decreased size of small mediastinal nodes consistent with inflammatory change.  5.  Interval development of pancreatic ductal dilatation in the distal body and tail with a proximal calcification measuring 4 mm. This could be post inflammatory related to interval pancreatitis. Underlying pancreatic body mass is not excluded.    Echo 1/22/19  CONCLUSIONS  Normal left ventricular size, thickness, systolic function, and   diastolic function.  Left ventricular ejection fraction is visually estimated to be 60%.  Normal regional wall motion.  Known bioprosthetic aortic valve with normal motion.  The valve leaflets appear moderately calcified.  Transvalvular gradients are - Peak:  34mmHg, Mean:  16mmHg.  No aortic insufficiency.  Structurally normal mitral valve without significant stenosis or   regurgitation.  Mildly dilated left atrium.  Structurally normal tricuspid valve without significant stenosis or   regurgitation.  Normal inferior vena cava size and inspiratory collapse.  Normal right ventricular size and systolic function.  Normal pericardium without effusion.  Compared to prior study 2017 the gradient across the aortic valve are   slightly increased but otherwise unchanged  CTA chest 3/16/20  Coarctations aorta similar to previous findings.  Interval sternotomy and placement aortic valvular prosthesis. The ascending aorta measures 3.1 x 3.4 cm as compared to  3.3 x 3.6 cm previously.  Hyperexpanded lungs and scattered linear atelectasis. No consolidation or pleural effusions.  Medical Decision Making:  Today's Assessment / Status / Plan:     1. S/P AVR (aortic valve replacement), bioprosthetic, 2016     2. Mixed hyperlipidemia     3. Impaired fasting blood sugar     4. Essential hypertension     5. Coarctation of aorta, CTA and ECHO every 2 years, f/u vascular medicine (Dr. Bloch)       Patient Type: Secondary Prevention    Etiology of Established CVD if Present:   1.  Aortic coarctation with mild aneurysmal change  2.  Aortic valve disease status post bovine aVR in 2016    Lipid Management: Qualifies for Statin Therapy Based on 2013 ACC/AHA Guidelines: yes  Calculated 10-Year Risk of ASCVD: N/A  Currently on Statin: Yes  Patient with vascular disease, although not atherosclerotic - nonetheless would like to treat aggressively  Improved, after change from prava to atorva 80  Has been taking half tab (40mg) and admits to missing some doses-- recent labs show suboptimal control   Plan:  - Take full tablet of atorva 80mg daily   - Repeat lipid panel in 6-8 weeks  - Consider addition of zetia depending on results  - TLC per below    Blood Pressure Management:  Acc/aha bp goal <130/80  BP seems well controlled both in office and at home  PCP changed metoprolol to carvedilol in favor of lower dose due to HR <50  HR now in 50's  - Continue carvedilol for now  - Monitor HR daily-- if HR <50, call our office  - Continue lisinopril  - Continue home blood pressure monitoring    Glycemic Status: Prediabetes  Most recent glucose c/w IFG  - Lifestyle mod per below  - Continue to follow glucose over time    Anti-Platelet/Anti-Coagulant Tx: yes  Patient has completed 3 month course of anticoagulation after valve replacement   - Continue indefinite aspirin    Smoking: Continue complete avoidance    Physical Activity: Increase frequency and duration but avoid heavy lifting    Weight  Management and Nutrition: Dietary plan was discussed with patient at this visit including focus on low sodium and low simple carbohydrates    Other:     1.  Aortic coarctation, asymptomatic, long-standing, mild aneurysmal dilatation distal to the lesion, appears to have a familial component  - Medical management as above  - Continue surveillance imaging with CTA/MRA every two years  - Again recommend a screening echocardiogram for all first-degree relatives  - Since he does not have a true aortic aneurysm or dissection I think that genetic testing would be of low yield and not indicated at present    2.  Aortic valve disease status post bovine aVR in 2016, associated with aortic coarctation as above. No mention that his valve was bicuspid  - Medical management as above  - Echo every 2 years unless cards recommends otherwise  - Recommended screening echocardiogram for all first-degree relatives as above    Will defer future medical management to cardiology a patient request  We'll continue to partner with cardiology and ensuring appropriate surveillance    Instructed to follow-up with PCP for remainder of adult medical needs: yes  We will partner with other providers in the management of established vascular disease and cardiometabolic risk factors.    Studies to Be Obtained:    1.  Echocardiogram july 2020  2.  CTA thoracic aorta March 2022    Labs to Be Obtained: as above    Follow up in: 6 months    MICAELA Finnegan     Cc:  RHEA Love

## 2020-05-28 ENCOUNTER — TELEPHONE (OUTPATIENT)
Dept: VASCULAR LAB | Facility: MEDICAL CENTER | Age: 70
End: 2020-05-28

## 2020-05-28 DIAGNOSIS — Q25.1 COARCTATION OF AORTA: ICD-10-CM

## 2020-05-28 DIAGNOSIS — Z95.2 S/P AVR (AORTIC VALVE REPLACEMENT): ICD-10-CM

## 2020-06-17 DIAGNOSIS — R00.1 BRADYCARDIA: ICD-10-CM

## 2020-06-18 RX ORDER — CARVEDILOL 3.12 MG/1
TABLET ORAL
Qty: 180 TAB | Refills: 0 | Status: SHIPPED | OUTPATIENT
Start: 2020-06-18 | End: 2020-09-21

## 2020-06-24 ENCOUNTER — TELEPHONE (OUTPATIENT)
Dept: VASCULAR LAB | Facility: MEDICAL CENTER | Age: 70
End: 2020-06-24

## 2020-06-24 ENCOUNTER — HOSPITAL ENCOUNTER (OUTPATIENT)
Dept: CARDIOLOGY | Facility: MEDICAL CENTER | Age: 70
End: 2020-06-24
Attending: NURSE PRACTITIONER
Payer: MEDICARE

## 2020-06-24 DIAGNOSIS — Z95.2 S/P AVR (AORTIC VALVE REPLACEMENT): ICD-10-CM

## 2020-06-24 LAB
LV EJECT FRACT  99904: 60
LV EJECT FRACT MOD 2C 99903: 60.38
LV EJECT FRACT MOD 4C 99902: 72.27
LV EJECT FRACT MOD BP 99901: 66.77

## 2020-06-24 PROCEDURE — 93306 TTE W/DOPPLER COMPLETE: CPT

## 2020-06-24 PROCEDURE — 93306 TTE W/DOPPLER COMPLETE: CPT | Mod: 26 | Performed by: INTERNAL MEDICINE

## 2020-06-25 ENCOUNTER — TELEPHONE (OUTPATIENT)
Dept: VASCULAR LAB | Facility: MEDICAL CENTER | Age: 70
End: 2020-06-25

## 2020-06-25 NOTE — TELEPHONE ENCOUNTER
Spoke with the pt regarding the results of the echo. Let patient know that the valve generally looks fine, but there may be just a bit of a leak around the valve. He has been having some cp off and on for the last month. Denies SOB or lightheadedness. He will contact the cardiologist for re-evaluation and to assess his symptoms.     Put on calendar for repeat echo in 1 year unless sooner by card. KATE Finnegan.

## 2020-06-25 NOTE — TELEPHONE ENCOUNTER
Echocardiogram reviewed  aVR appears to function normally except for possible mild perivalvular leak  Assuming patient remains asymptomatic, we will repeat an echocardiogram in 1 year    Michael Bloch, MD  Vascular Medicine

## 2020-07-16 ENCOUNTER — TELEPHONE (OUTPATIENT)
Dept: MEDICAL GROUP | Age: 70
End: 2020-07-16

## 2020-07-16 NOTE — TELEPHONE ENCOUNTER
ESTABLISHED PATIENT PRE-VISIT PLANNING     Patient was NOT contacted to complete PVP.     Note: Patient will not be contacted if there is no indication to call.     1.  Reviewed notes from the last few office visits within the medical group: Yes    2.  If any orders were placed at last visit or intended to be done for this visit (i.e. 6 mos follow-up), do we have Results/Consult Notes?        •  Labs - Labs were not ordered at last office visit.   Note: If patient appointment is for lab review and patient did not complete labs, check with provider if OK to reschedule patient until labs completed.       •  Imaging - Imaging was not ordered at last office visit.       •  Referrals - No referrals were ordered at last office visit.    3. Is this appointment scheduled as a Hospital Follow-Up? No    4.  Immunizations were updated in Epic using WebIZ?: Epic matches WebIZ       •  Web Iz Recommendations: SHINGRIX (Shingles)    5.  Patient is due for the following Health Maintenance Topics:   Health Maintenance Due   Topic Date Due   • IMM ZOSTER VACCINES (2 of 3) 12/01/2014           6. Orders for overdue Health Maintenance topics pended in Pre-Charting? N\A    7.  AHA (MDX) form printed for Provider? YES    8.  Patient was NOT informed to arrive 15 min prior to their scheduled appointment and bring in their medication bottles.

## 2020-07-20 ENCOUNTER — TELEMEDICINE (OUTPATIENT)
Dept: MEDICAL GROUP | Age: 70
End: 2020-07-20
Payer: MEDICARE

## 2020-07-20 VITALS — BODY MASS INDEX: 27.32 KG/M2 | WEIGHT: 170 LBS | HEIGHT: 66 IN

## 2020-07-20 DIAGNOSIS — Z85.46 PERSONAL HISTORY OF MALIGNANT NEOPLASM OF PROSTATE: ICD-10-CM

## 2020-07-20 DIAGNOSIS — F41.0 PANIC ATTACK: ICD-10-CM

## 2020-07-20 DIAGNOSIS — Z02.9 ADMINISTRATIVE ENCOUNTER: Primary | ICD-10-CM

## 2020-07-20 PROCEDURE — 99214 OFFICE O/P EST MOD 30 MIN: CPT | Mod: 95,CR | Performed by: FAMILY MEDICINE

## 2020-07-20 PROCEDURE — 8041 PR SCP AHA: Mod: 95 | Performed by: FAMILY MEDICINE

## 2020-07-20 RX ORDER — ALPRAZOLAM 0.25 MG/1
0.25 TABLET ORAL NIGHTLY PRN
Qty: 30 TAB | Refills: 0 | Status: SHIPPED | OUTPATIENT
Start: 2020-07-20 | End: 2020-08-19

## 2020-07-20 SDOH — HEALTH STABILITY: MENTAL HEALTH: HOW OFTEN DO YOU HAVE A DRINK CONTAINING ALCOHOL?: 2-3 TIMES A WEEK

## 2020-07-20 ASSESSMENT — FIBROSIS 4 INDEX: FIB4 SCORE: 1.29

## 2020-07-20 NOTE — NON-PROVIDER
Annual Health Assessment Questions:    1.  Are you currently engaging in any exercise or physical activity? Yes    2.  How would you describe your mood or emotional well-being today? good    3.  Have you had any falls in the last year? Yes, one with injury    4.  Have you noticed any problems with your balance or had difficulty walking? No    5.  In the last six months have you experienced any leakage of urine? No    6. DPA/Advanced Directive: Patient does not have an Advanced Directive.  A packet and workshop information was given on Advanced Directives.

## 2020-07-21 NOTE — PROGRESS NOTES
Telemedicine Visit: Established Patient     This encounter was conducted via [employer].   Verbal consent was obtained. Patient's identity was verified.    Subjective:   CC: administrative encounter   Juan Berumen is a 70 y.o. male presenting for evaluation and management of:    Pt recently stopped working due to concerns of covid 19 infection. He works for local [employer]. He states that he constantly exposes to the public. He also works with younger men who do not appear to take Covid infection seriously. He states that his coworkers sneeze/cough on one another for fun. Given past hx of prostate cancer and ongoing HTN, HLD, valvular disease, Coartation of the aorta, impaired fasting blood sugar, he feels that he would be at risks for complications and possibly death if he contracts covid-19. His manager does not enforce mask policy. He does not feel safe at work and forces to stop working. He requests documentation to be filled out to allow him to apply for unemployment.     He c/o intermittent panic attacks. He does not want to take medications of anxiety. He has been taking Xanax 0.25 mg PRN for acute panic attacks. He tolerates medication well, no side effect reported.  He denies history of breath, alcohol abuse.    Patient was diagnosed with prostate cancer in 2011, status post prostatectomy.  He was found to have elevated PSA in 2017.  He received salvage radiation and Lupron.  He continues to follow-up regularly with his urologist.  He is doing well, denies any urinary symptoms.    ROS     Denies any recent fevers or chills. No nausea or vomiting. No chest pains or shortness of breath.     Allergies   Allergen Reactions   • Zoloft      Increased anxiety.       Current medicines (including changes today)  Current Outpatient Medications   Medication Sig Dispense Refill   • ALPRAZolam (XANAX) 0.25 MG Tab Take 1 Tab by mouth at bedtime as needed for Anxiety for up to 30 days. 30 Tab 0   • carvedilol (COREG)  3.125 MG Tab TAKE 1 TABLET BY MOUTH TWICE DAILY WITH MEALS 180 Tab 0   • atorvastatin (LIPITOR) 80 MG tablet Take 1 Tab by mouth every evening. 100 Tab 3   • lisinopril (PRINIVIL) 20 MG Tab Take 1 Tab by mouth every day. 100 Tab 3   • Omega-3 Fatty Acids (FISH OIL) 1000 MG Cap capsule Take 1,000 mg by mouth every day.     • aspirin (ASA) 81 MG Chew Tab chewable tablet Take 1 Tab by mouth every day. 100 Tab 11   • therapeutic multivitamin-minerals (THERAGRAN-M) TABS Take 1 Tab by mouth every day.       No current facility-administered medications for this visit.        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 Bloch) 04/26/2016   • Mixed hyperlipidemia 02/01/2013   • Essential hypertension 02/01/2013       Family History   Problem Relation Age of Onset   • Lung Disease Mother         Severe, chronic bronchitis   • Hyperlipidemia Mother    • GI Disease Mother         colon polyps   • Other Mother         heart valve/migraine   • Psychiatric Illness Father         Depression   • Hyperlipidemia Brother    • Stroke Maternal Grandmother 55   • GI Disease Brother         colon polyps   • Stroke Maternal Aunt 55   • Cancer Neg Hx        He  has a past medical history of Anxiety, Aortic coarctation, Aortic valve stenosis, Chest pain (4/23/2016), Depression, Headache(784.0), Heart murmur, Hyperlipidemia, Hypertension, Migraine, Muscle, jerky movements (uncontrolled) (1974), Nodular hyperplasia of prostate  "gland (4/11/2011), NSTEMI (non-ST elevated myocardial infarction) (HCC) (4/25/2016), Prostate cancer (HCC), Prostate cancer (HCC), RLQ abdominal pain (7/1/2016), and Tachycardia (4/23/2016). He also has no past medical history of GERD (gastroesophageal reflux disease), Thyroid disease, Tremor, essential, Ulcer, or Urinary tract infection, site not specified.  He  has a past surgical history that includes hernia repair; other; and aortic valve replacement (4/28/2016).       Objective:   Ht 1.676 m (5' 6\")   Wt 77.1 kg (170 lb) Comment: pt stated  BMI 27.44 kg/m²     Physical Exam:  Constitutional: Alert, no distress, well-groomed.  Skin: No rashes in visible areas.  Eye: Round. Conjunctiva clear, lids normal. No icterus.   ENMT: Lips pink without lesions, good dentition, moist mucous membranes. Phonation normal.  Neck: No masses, no thyromegaly. Moves freely without pain.  CV: Pulse as reported by patient  Respiratory: Unlabored respiratory effort, no cough or audible wheeze  Psych: Alert and oriented x3, normal affect and mood.       Assessment and Plan:   The following treatment plan was discussed:     1. Administrative encounter  Pt is 71 yo male with multiple comorbidity, currently working for local [employer] with exposure to public and coworkers. His employer does not enforce mask policy. His coworkers do not take risk of Covid 19 infection and has been pulling blount on one another. Pt feels vulnerable and unsafe. He requests temporary removal from work. DETR formed filled out and scanned into media.     2. Personal history of malignant neoplasm of prostate[facility] [place]   Patient was diagnosed with prostate cancer in 2011, status post prostatectomy.  He was found to have elevated PSA in 2017.  He received salvage radiation and Lupron in 2017.  He continues to follow-up regularly with his urologist.  He is doing well, denies any urinary symptoms.  - f/u with urology as directed.     3. Panic attack  Chronic " intermittent panic attacks, declined the need to take daily medications. He has been taking Xanax 0.25 mg PRN for acute symptoms.  He tolerated medication well, no side effect reported.  He denies history of drugs or alcohol abuse.  [facility] reviewed, no concerns for drug abuse at this time.  - ALPRAZolam (XANAX) 0.25 MG Tab; Take 1 Tab by mouth at bedtime as needed for Anxiety for up to 30 days.  Dispense: 30 Tab; Refill: 0  - Risks, benefits, side effects, as well as potential health complications associated with Xanax discussed with patient. Appropriate counseling provided.         Follow-up: Return for As needed.

## 2020-07-27 ENCOUNTER — HOSPITAL ENCOUNTER (OUTPATIENT)
Dept: LAB | Facility: MEDICAL CENTER | Age: 70
End: 2020-07-27
Attending: UROLOGY
Payer: MEDICARE

## 2020-07-27 LAB — PSA SERPL-MCNC: <0.02 NG/ML (ref 0–4)

## 2020-07-27 PROCEDURE — 84153 ASSAY OF PSA TOTAL: CPT

## 2020-07-27 PROCEDURE — 36415 COLL VENOUS BLD VENIPUNCTURE: CPT

## 2020-08-05 ENCOUNTER — OFFICE VISIT (OUTPATIENT)
Dept: CARDIOLOGY | Facility: MEDICAL CENTER | Age: 70
End: 2020-08-05
Payer: MEDICARE

## 2020-08-05 VITALS
HEART RATE: 55 BPM | WEIGHT: 166 LBS | BODY MASS INDEX: 26.06 KG/M2 | OXYGEN SATURATION: 98 % | DIASTOLIC BLOOD PRESSURE: 72 MMHG | SYSTOLIC BLOOD PRESSURE: 134 MMHG | HEIGHT: 67 IN

## 2020-08-05 DIAGNOSIS — Z95.2 S/P AVR (AORTIC VALVE REPLACEMENT): ICD-10-CM

## 2020-08-05 DIAGNOSIS — Q25.1 COARCTATION OF AORTA: ICD-10-CM

## 2020-08-05 DIAGNOSIS — Z98.890 HISTORY OF CARDIAC CATH: ICD-10-CM

## 2020-08-05 DIAGNOSIS — E78.2 MIXED HYPERLIPIDEMIA: ICD-10-CM

## 2020-08-05 DIAGNOSIS — I10 ESSENTIAL HYPERTENSION: ICD-10-CM

## 2020-08-05 PROCEDURE — 99214 OFFICE O/P EST MOD 30 MIN: CPT | Performed by: NURSE PRACTITIONER

## 2020-08-05 ASSESSMENT — ENCOUNTER SYMPTOMS
CLAUDICATION: 0
PALPITATIONS: 0
SHORTNESS OF BREATH: 0
DIZZINESS: 0
WEAKNESS: 0
WEIGHT LOSS: 0
PND: 0
ORTHOPNEA: 0

## 2020-08-05 ASSESSMENT — FIBROSIS 4 INDEX: FIB4 SCORE: 1.29
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
## 2020-09-19 DIAGNOSIS — R00.1 BRADYCARDIA: ICD-10-CM

## 2020-09-21 RX ORDER — CARVEDILOL 3.12 MG/1
TABLET ORAL
Qty: 180 TAB | Refills: 1 | Status: SHIPPED | OUTPATIENT
Start: 2020-09-21 | End: 2021-01-22 | Stop reason: SDUPTHER

## 2020-09-21 NOTE — TELEPHONE ENCOUNTER
Received request via: Pharmacy    Was the patient seen in the last year in this department? Yes    Does the patient have an active prescription (recently filled or refills available) for medication(s) requested? No     No Yes

## 2020-10-27 ASSESSMENT — ENCOUNTER SYMPTOMS
NERVOUS/ANXIOUS: 0
SENSORY CHANGE: 0
BRUISES/BLEEDS EASILY: 1
SHORTNESS OF BREATH: 0
LOSS OF CONSCIOUSNESS: 0
BLOOD IN STOOL: 0
WEIGHT LOSS: 1
DEPRESSION: 0
DIZZINESS: 0
SPEECH CHANGE: 0
CLAUDICATION: 0
MYALGIAS: 0
PALPITATIONS: 0
HEADACHES: 0
COUGH: 0
FOCAL WEAKNESS: 0

## 2020-10-28 ENCOUNTER — OFFICE VISIT (OUTPATIENT)
Dept: VASCULAR LAB | Facility: MEDICAL CENTER | Age: 70
End: 2020-10-28
Attending: NURSE PRACTITIONER
Payer: MEDICARE

## 2020-10-28 VITALS
SYSTOLIC BLOOD PRESSURE: 110 MMHG | HEART RATE: 58 BPM | WEIGHT: 173 LBS | HEIGHT: 67 IN | DIASTOLIC BLOOD PRESSURE: 64 MMHG | BODY MASS INDEX: 27.15 KG/M2

## 2020-10-28 DIAGNOSIS — E78.2 MIXED HYPERLIPIDEMIA: ICD-10-CM

## 2020-10-28 DIAGNOSIS — I10 ESSENTIAL HYPERTENSION: ICD-10-CM

## 2020-10-28 DIAGNOSIS — R73.01 IMPAIRED FASTING GLUCOSE: ICD-10-CM

## 2020-10-28 DIAGNOSIS — Z13.29 SCREENING FOR THYROID DISORDER: ICD-10-CM

## 2020-10-28 DIAGNOSIS — Z95.2 S/P AVR (AORTIC VALVE REPLACEMENT): ICD-10-CM

## 2020-10-28 PROCEDURE — 99214 OFFICE O/P EST MOD 30 MIN: CPT | Performed by: NURSE PRACTITIONER

## 2020-10-28 PROCEDURE — 99212 OFFICE O/P EST SF 10 MIN: CPT | Performed by: NURSE PRACTITIONER

## 2020-10-28 ASSESSMENT — FIBROSIS 4 INDEX: FIB4 SCORE: 1.29

## 2020-10-28 NOTE — PROGRESS NOTES
"  Follow Up VASCULAR VISIT  Subjective:   Juan Berumen is a 70 y.o. male who presents today 10/28/20 for   Chief Complaint   Patient presents with   • Follow-Up         HPI:  Here for f/u of coarctation, valvular heart disease, hypertension, and dyslipidemia  BP at home usually  120-130/80   Taking cardedilol instead of metoprolol- changed by PCP due to low HR   Denies fatigue   On atorvastatin 80mg--   No CP or SOB or palpitations  No LE swelling  On ASA-- no bleeding issues  Walking more about 5 miles a day up hill    Social History     Tobacco Use   Smoking Status Former Smoker   • Packs/day: 0.50   • Years: 15.00   • Pack years: 7.50   • Types: Cigarettes   • Quit date: 2008   • Years since quittin.5   Smokeless Tobacco Never Used     Allergies   Allergen Reactions   • Zoloft      Increased anxiety.     DIET AND EXERCISE:  Weight Change: up about 10 pounds  Diet: common adult  Exercise: moderate regular exercise program     Review of Systems   Constitutional: Positive for weight loss. Negative for malaise/fatigue.   HENT: Negative for nosebleeds.    Respiratory: Negative for cough and shortness of breath.    Cardiovascular: Positive for chest pain (with walking ). Negative for palpitations, claudication and leg swelling.   Gastrointestinal: Negative for blood in stool.   Genitourinary: Negative for hematuria.   Musculoskeletal: Negative for joint pain and myalgias.   Neurological: Negative for dizziness, sensory change, speech change, focal weakness, loss of consciousness and headaches.   Endo/Heme/Allergies: Bruises/bleeds easily.   Psychiatric/Behavioral: Negative for depression. The patient is not nervous/anxious.       Objective:     Vitals:    10/28/20 0931   BP: 110/64   BP Location: Left arm   Patient Position: Sitting   BP Cuff Size: Adult   Pulse: (!) 58   Weight: 78.5 kg (173 lb)   Height: 1.702 m (5' 7\")      Body mass index is 27.1 kg/m².  Physical Exam   Constitutional: He is oriented to " person, place, and time. He appears well-developed and well-nourished.   Cardiovascular: Normal rate, regular rhythm and intact distal pulses.   Murmur heard.  Pulmonary/Chest: Effort normal and breath sounds normal. No respiratory distress. He has no wheezes. He has no rales.   Musculoskeletal:         General: No edema.   Neurological: He is alert and oriented to person, place, and time.   Skin: Skin is warm and dry.   Psychiatric: He has a normal mood and affect. His behavior is normal.   Vitals reviewed.    Lab Results   Component Value Date    CHOLSTRLTOT 151 02/26/2020    LDL 99 02/26/2020    HDL 32 (A) 02/26/2020    TRIGLYCERIDE 101 02/26/2020      Lab Results   Component Value Date    SODIUM 136 02/26/2020    POTASSIUM 4.1 02/26/2020    CHLORIDE 105 02/26/2020    CO2 27 02/26/2020    GLUCOSE 110 (H) 02/26/2020    BUN 17 02/26/2020    CREATININE 1.12 02/26/2020    IFAFRICA >60 02/26/2020    IFNOTAFR >60 02/26/2020      Lab Results   Component Value Date    WBC 4.7 (L) 02/26/2020    RBC 4.92 02/26/2020    HEMOGLOBIN 15.4 02/26/2020    HEMATOCRIT 45.9 02/26/2020    MCV 93.3 02/26/2020    MCH 31.3 02/26/2020    MCHC 33.6 (L) 02/26/2020    MPV 9.4 02/26/2020      CT angiogram April 2016 - focal bandlike narrowing of the descending thoracic aorta without distal flow limitation or compromise. No evidence of aneurysm. Extensive calcification within the aortic valve.    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 Maximum dilation of the aorta proximal to the lesion was 3.2 cm and distal to the lesion was 3.8 cm    Carotid duplex April 2016 - mild plaque bilaterally without significant stenosis. Antegrade flow in both vertebral arteries    Echo september 2017  Prior echo done 06/03/16.  Normal left ventricular systolic function.   Known  bioprosthetic aortic valve that is functioning normally with   normal transvalvular gradients. Vmax is 2.62  m/s. Transvalvular   gradients are - Peak: 27 mmHg, Mean: 15 mmHg.  Compared to the images of the prior study done -  there has been no   significant change.     CTA march 2018:  1.  There is no interval change in the focal bandlike area of narrowing in the proximal descending thoracic aorta consistent with history of coarctation.  2.  There is no change in the size of the descending thoracic aorta above or below the area of narrowing.  3.  There has been interval aortic valve replacement with sternotomy changes.  4.  Decreased size of small mediastinal nodes consistent with inflammatory change.  5.  Interval development of pancreatic ductal dilatation in the distal body and tail with a proximal calcification measuring 4 mm. This could be post inflammatory related to interval pancreatitis. Underlying pancreatic body mass is not excluded.    Echo 1/22/19  CONCLUSIONS  Normal left ventricular size, thickness, systolic function, and   diastolic function.  Left ventricular ejection fraction is visually estimated to be 60%.  Normal regional wall motion.  Known bioprosthetic aortic valve with normal motion.  The valve leaflets appear moderately calcified.  Transvalvular gradients are - Peak:  34mmHg, Mean:  16mmHg.  No aortic insufficiency.  Structurally normal mitral valve without significant stenosis or   regurgitation.  Mildly dilated left atrium.  Structurally normal tricuspid valve without significant stenosis or   regurgitation.  Normal inferior vena cava size and inspiratory collapse.  Normal right ventricular size and systolic function.  Normal pericardium without effusion.  Compared to prior study 2017 the gradient across the aortic valve are   slightly increased but otherwise unchanged  CTA chest 3/16/20  Coarctations aorta similar to previous findings.  Interval sternotomy and placement aortic valvular  prosthesis. The ascending aorta measures 3.1 x 3.4 cm as compared to 3.3 x 3.6 cm previously.  Hyperexpanded lungs and scattered linear atelectasis. No consolidation or pleurals effusions  Echo 6/24/20  .Left ventricular ejection fraction is visually estimated to be 60%.   Grade II diastolic dysfunction.  Normal right ventricular size with reduced systolic function.  Biatrial enlargement.  Known bioprosthetic aortic valve that is functioning normally with   normal transvalvular gradients.  Mild, possibly paravalvular aortic regurgitation is noted.  No pericardial effusion seen.     Compared to prior echo 01/22/19, mild aortic regurgitation appears new.   Consider JENNIFER to further assess if clinically warranted.    Medical Decision Making:  Today's Assessment / Status / Plan:     1. Essential hypertension     2. Impaired fasting blood sugar     3. Mixed hyperlipidemia     4. S/P AVR (aortic valve replacement), bioprosthetic, 2016       Patient Type: Secondary Prevention    Etiology of Established CVD if Present:   1.  Aortic coarctation with mild aneurysmal change  2.  Aortic valve disease status post bovine aVR in 2016    Lipid Management: Qualifies for Statin Therapy Based on 2013 ACC/AHA Guidelines: yes  Calculated 10-Year Risk of ASCVD: N/A  Currently on Statin: Yes  Patient with vascular disease, although not atherosclerotic - nonetheless would like to treat aggressively  Improved, after change from prava to atorva 80  Has been taking half tab (40mg) and admits to missing some doses-- recent labs show suboptimal control   Plan:  - Take full tablet of atorva 80mg daily   - Repeat lipid panel in 6-8 weeks  - Consider addition of zetia depending on results  - TLC per below    Blood Pressure Management:  Acc/aha bp goal <130/80  BP seems well controlled both in office and at home  PCP changed metoprolol to carvedilol in favor of lower dose due to HR <50  HR now in 50's  - Continue carvedilol for now  - Monitor HR  daily-- if HR <50, call our office  - Continue lisinopril  - Continue home blood pressure monitoring    Glycemic Status: Prediabetes  Most recent glucose c/w IFG  - Lifestyle mod per below  - Continue to follow glucose over time    Anti-Platelet/Anti-Coagulant Tx: yes  Patient has completed 3 month course of anticoagulation after valve replacement   - Continue indefinite aspirin    Smoking: Continue complete avoidance    Physical Activity: Increase frequency and duration but avoid heavy lifting    Weight Management and Nutrition: Dietary plan was discussed with patient at this visit including focus on low sodium and low simple carbohydrates    Other:     1.  Aortic coarctation, asymptomatic, long-standing, mild aneurysmal dilatation distal to the lesion, appears to have a familial component  - Medical management as above  - Continue surveillance imaging with CTA/MRA every two years  - Again recommend a screening echocardiogram for all first-degree relatives  - Since he does not have a true aortic aneurysm or dissection I think that genetic testing would be of low yield and not indicated at present  - encouraged to fu with cardiology regarding minor pain with exercise    2.  Aortic valve disease status post bovine aVR in 2016, associated with aortic coarctation as above. No mention that his valve was bicuspid. Mild perivalvular leak   - Medical management as above  - Echo June 2021 in 1 years unless cards recommends otherwise due to mild perivalvular leak   - Recommended screening echocardiogram for all first-degree relatives as above    Will defer future medical management to cardiology a patient request  We'll continue to partner with cardiology and ensuring appropriate surveillance    Instructed to follow-up with PCP for remainder of adult medical needs: yes  We will partner with other providers in the management of established vascular disease and cardiometabolic risk factors.    Studies to Be Obtained:    1.   Echocardiogram June 2021  2.  CTA thoracic aorta March 2022    Labs to Be Obtained: as above    Follow up in: 7 months after echo    MICAELA Finnegan     Cc:  RHEA Love

## 2020-10-29 RX ORDER — LISINOPRIL 20 MG/1
TABLET ORAL
Qty: 100 TAB | Refills: 1 | Status: SHIPPED | OUTPATIENT
Start: 2020-10-29 | End: 2021-05-17 | Stop reason: SDUPTHER

## 2020-10-29 RX ORDER — ATORVASTATIN CALCIUM 80 MG/1
80 TABLET, FILM COATED ORAL EVERY EVENING
Qty: 100 TAB | Refills: 1 | Status: SHIPPED | OUTPATIENT
Start: 2020-10-29 | End: 2021-06-01

## 2020-11-20 ENCOUNTER — TELEPHONE (OUTPATIENT)
Dept: MEDICAL GROUP | Age: 70
End: 2020-11-20

## 2020-11-20 NOTE — TELEPHONE ENCOUNTER
"ESTABLISHED PATIENT PRE-VISIT PLANNING   11/20/2020@9:35AM Called & spoke to pt.   Advised Virtual OV scheduled Mon. 11/23/2020@1:40PM. [employer] Active. Zoom char downloaded.    Patient was contacted to complete PVP.     Note: Patient will not be contacted if there is no indication to call.     1.  Reviewed notes from the last few office visits within the medical group: Yes    2.  If any orders were placed at last visit or intended to be done for this visit (i.e. 6 mos follow-up), do we have Results/Consult Notes?        •  Labs - Labs were not ordered at last office visit. Per Dr. Love's Telemedicine notes on 07/20/2020, \"Follow-up: Return for As needed.\"   Note: If patient appointment is for lab review and patient did not complete labs, check with provider if OK to reschedule patient until labs completed.       •  Imaging - Imaging ordered, completed and results are in chart. EC Echocardiogram previously ordered by SHIVAM FinneganPCHARISSE.       •  Referrals - No referrals were ordered at last office visit.    3. Is this appointment scheduled as a Hospital Follow-Up? No    4.  Immunizations were updated in Epic using WebIZ?: Epic matches WebIZ       •  Web [employer] Recommendations: FLU and SHINGRIX (Shingles)    5.  Patient is due for the following Health Maintenance Topics:   Health Maintenance Due   Topic Date Due   • IMM ZOSTER VACCINES (2 of 3) 12/01/2014   • IMM INFLUENZA (1) 09/01/2020   • Annual Wellness Visit  09/17/2020     6. Orders for overdue Health Maintenance topics pended in Pre-Charting? YES    7.  AHA (MDX) form printed for Provider? No, already completed    8.  Patient was informed to arrive 15 min prior to their scheduled appointment and bring in their medication bottles.  "

## 2020-11-23 ENCOUNTER — TELEMEDICINE (OUTPATIENT)
Dept: MEDICAL GROUP | Age: 70
End: 2020-11-23
Payer: MEDICARE

## 2020-11-23 VITALS
HEART RATE: 62 BPM | WEIGHT: 168 LBS | DIASTOLIC BLOOD PRESSURE: 64 MMHG | BODY MASS INDEX: 26.37 KG/M2 | HEIGHT: 67 IN | TEMPERATURE: 97.6 F | SYSTOLIC BLOOD PRESSURE: 126 MMHG

## 2020-11-23 DIAGNOSIS — F34.1 DYSTHYMIA: ICD-10-CM

## 2020-11-23 DIAGNOSIS — Z23 NEED FOR VACCINATION: ICD-10-CM

## 2020-11-23 DIAGNOSIS — F43.0 ACUTE STRESS DISORDER: Primary | ICD-10-CM

## 2020-11-23 PROCEDURE — 99214 OFFICE O/P EST MOD 30 MIN: CPT | Mod: 95,CR | Performed by: FAMILY MEDICINE

## 2020-11-23 RX ORDER — BENZONATATE 200 MG/1
CAPSULE ORAL
COMMUNITY
End: 2020-11-23

## 2020-11-23 RX ORDER — ALPRAZOLAM 0.25 MG/1
0.25 TABLET ORAL NIGHTLY PRN
Qty: 30 TAB | Refills: 0 | Status: SHIPPED | OUTPATIENT
Start: 2020-11-23 | End: 2021-02-08 | Stop reason: SDUPTHER

## 2020-11-23 RX ORDER — VENLAFAXINE HYDROCHLORIDE 37.5 MG/1
37.5 CAPSULE, EXTENDED RELEASE ORAL DAILY
Qty: 90 CAP | Refills: 1 | Status: SHIPPED | OUTPATIENT
Start: 2020-11-23 | End: 2021-07-19

## 2020-11-23 RX ORDER — ALPRAZOLAM 0.25 MG/1
TABLET ORAL
COMMUNITY
End: 2020-11-23

## 2020-11-23 RX ORDER — AZITHROMYCIN 250 MG/1
TABLET, FILM COATED ORAL
COMMUNITY
End: 2020-11-23

## 2020-11-23 ASSESSMENT — FIBROSIS 4 INDEX: FIB4 SCORE: 1.29

## 2020-11-23 NOTE — PROGRESS NOTES
Virtual Visit: Established Patient   This visit was conducted via Zoom using secure and encrypted videoconferencing technology. The patient was in a private location in the state of [place].    The patient's identity was confirmed and verbal consent was obtained for this virtual visit.    Subjective:   CC:   Chief Complaint   Patient presents with   • Mental Heatlh Problem       Juan Berumen is a 70 y.o. male presenting for evaluation and management of:    Pt had mild anxiety, previously controlled with Xanax 0.25 mg PRN. He c/o increasing social stress related to his daughter's mental health problems (ADHD and Paranoid). She kicked her own son out a few weeks ago. His grandson moved in with patient. He has some behavioral problems that bother pt. He has a few episodes of worsening anxiety flares to the point 4 tablets of Xanax 0.25 mg did not calm him down. He also c/o worsening stress related to national politics, election, Covid 19 infection. Pt recently retired. He denies hx of drugs, alcohol, tobacco abuse. She feels little depressed. He stopped watching the news, which is helping with his symptoms. He otherwise denies SI, HI, feeling worthlessness, insomnia, reduced/increased appetite, anhedonia.     ROS   Denies any recent fevers or chills. No nausea or vomiting. No chest pains or shortness of breath.     Allergies   Allergen Reactions   • Zoloft      Increased anxiety.       Current medicines (including changes today)  Current Outpatient Medications   Medication Sig Dispense Refill   • venlafaxine XR (EFFEXOR XR) 37.5 MG CAPSULE SR 24 HR Take 1 Cap by mouth every day. 90 Cap 1   • ALPRAZolam (XANAX) 0.25 MG Tab Take 1 Tab by mouth at bedtime as needed for Anxiety for up to 30 days. 30 Tab 0   • atorvastatin (LIPITOR) 80 MG tablet Take 1 Tab by mouth every evening. 100 Tab 1   • lisinopril (PRINIVIL) 20 MG Tab Take 1 tablet by mouth once daily 100 Tab 1   • carvedilol (COREG) 3.125 MG Tab TAKE 1 TABLET  BY MOUTH TWICE DAILY WITH MEALS 180 Tab 1   • Omega-3 Fatty Acids (FISH OIL) 1000 MG Cap capsule Take 1,000 mg by mouth every day.     • aspirin (ASA) 81 MG Chew Tab chewable tablet Take 1 Tab by mouth every day. 100 Tab 11   • therapeutic multivitamin-minerals (THERAGRAN-M) TABS Take 1 Tab by mouth every day.       No current facility-administered medications for this visit.        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 Bloch) 04/26/2016   • Mixed hyperlipidemia 02/01/2013   • Essential hypertension 02/01/2013       Family History   Problem Relation Age of Onset   • Lung Disease Mother         Severe, chronic bronchitis   • Hyperlipidemia Mother    • GI Disease Mother         colon polyps   • Other Mother         heart valve/migraine   • Psychiatric Illness Father         Depression   • Hyperlipidemia Brother    • Stroke Maternal Grandmother 55   • GI Disease Brother         colon polyps   • Stroke Maternal Aunt 55   • Cancer Neg Hx        He  has a past medical history of Anxiety, Aortic coarctation, Aortic valve stenosis, Chest pain (4/23/2016), Depression, Headache(784.0), Heart murmur, Hyperlipidemia, Hypertension, Migraine, Muscle, jerky movements (uncontrolled) (1974), Nodular hyperplasia of prostate gland (4/11/2011), NSTEMI (non-ST elevated myocardial infarction) (HCC) (4/25/2016), Prostate cancer (HCC), Prostate cancer (HCC), RLQ abdominal pain (7/1/2016), and Tachycardia (4/23/2016).  "He also has no past medical history of GERD (gastroesophageal reflux disease), Thyroid disease, Tremor, essential, Ulcer, or Urinary tract infection, site not specified.  He  has a past surgical history that includes hernia repair; other; and aortic valve replacement (4/28/2016).       Objective:   /64 (BP Location: Left arm, Patient Position: Sitting, BP Cuff Size: Adult) Comment: Pt. stated  Pulse 62 Comment: Pt. stated  Temp 36.4 °C (97.6 °F) Comment: Pt. stated  Ht 1.702 m (5' 7\") Comment: Pt. stated  Wt 76.2 kg (168 lb) Comment: Pt. stated  BMI 26.31 kg/m²     Physical Exam:  Constitutional: Alert, no distress, well-groomed.  Skin: No rashes in visible areas.  Eye: Round. Conjunctiva clear, lids normal. No icterus.   ENMT: Lips pink without lesions, good dentition, moist mucous membranes. Phonation normal.  Neck: No masses, no thyromegaly. Moves freely without pain.  Respiratory: Unlabored respiratory effort, no cough or audible wheeze  Psych: Alert and oriented x3, normal affect and mood.       Assessment and Plan:   The following treatment plan was discussed:     1. Acute stress disorder  2. Dysthymia  - venlafaxine XR (EFFEXOR XR) 37.5 MG CAPSULE SR 24 HR; Take 1 Cap by mouth every day.  Dispense: 90 Cap; Refill: 1  - ALPRAZolam (XANAX) 0.25 MG Tab; Take 1 Tab by mouth at bedtime as needed for Anxiety for up to 30 days.  Dispense: 30 Tab; Refill: 0  - Risks, benefits, side effects, as well as potential health complications associated with Xanax was previously discussed with patient. Appropriate counseling provided.    - s/e & expectation discussed with patient.   - regular exercises  - f/u in 3 months.     3. Need for vaccination  - Shingrix Vaccine; Future  - Influenza Vaccine, High Dose (65+ Only); Future       Follow-up: No follow-ups on file.           "

## 2021-01-11 ENCOUNTER — HOSPITAL ENCOUNTER (OUTPATIENT)
Dept: LAB | Facility: MEDICAL CENTER | Age: 71
End: 2021-01-11
Attending: NURSE PRACTITIONER
Payer: MEDICARE

## 2021-01-11 ENCOUNTER — TELEPHONE (OUTPATIENT)
Dept: VASCULAR LAB | Facility: MEDICAL CENTER | Age: 71
End: 2021-01-11

## 2021-01-11 DIAGNOSIS — E78.2 MIXED HYPERLIPIDEMIA: ICD-10-CM

## 2021-01-11 DIAGNOSIS — R73.01 IMPAIRED FASTING GLUCOSE: ICD-10-CM

## 2021-01-11 DIAGNOSIS — Z13.29 SCREENING FOR THYROID DISORDER: ICD-10-CM

## 2021-01-11 DIAGNOSIS — I10 ESSENTIAL HYPERTENSION: ICD-10-CM

## 2021-01-11 LAB
ALBUMIN SERPL BCP-MCNC: 3.8 G/DL (ref 3.2–4.9)
ALBUMIN/GLOB SERPL: 1.4 G/DL
ALP SERPL-CCNC: 84 U/L (ref 30–99)
ALT SERPL-CCNC: 35 U/L (ref 2–50)
ANION GAP SERPL CALC-SCNC: 9 MMOL/L (ref 7–16)
AST SERPL-CCNC: 25 U/L (ref 12–45)
BILIRUB SERPL-MCNC: 0.5 MG/DL (ref 0.1–1.5)
BUN SERPL-MCNC: 15 MG/DL (ref 8–22)
CALCIUM SERPL-MCNC: 9.1 MG/DL (ref 8.5–10.5)
CHLORIDE SERPL-SCNC: 106 MMOL/L (ref 96–112)
CHOLEST SERPL-MCNC: 176 MG/DL (ref 100–199)
CO2 SERPL-SCNC: 25 MMOL/L (ref 20–33)
CREAT SERPL-MCNC: 0.99 MG/DL (ref 0.5–1.4)
CREAT UR-MCNC: 113.48 MG/DL
EST. AVERAGE GLUCOSE BLD GHB EST-MCNC: 114 MG/DL
FASTING STATUS PATIENT QL REPORTED: NORMAL
GLOBULIN SER CALC-MCNC: 2.7 G/DL (ref 1.9–3.5)
GLUCOSE SERPL-MCNC: 106 MG/DL (ref 65–99)
HBA1C MFR BLD: 5.6 % (ref 0–5.6)
HDLC SERPL-MCNC: 35 MG/DL
LDLC SERPL CALC-MCNC: 117 MG/DL
MICROALBUMIN UR-MCNC: <1.2 MG/DL
MICROALBUMIN/CREAT UR: NORMAL MG/G (ref 0–30)
POTASSIUM SERPL-SCNC: 4.1 MMOL/L (ref 3.6–5.5)
PROT SERPL-MCNC: 6.5 G/DL (ref 6–8.2)
SODIUM SERPL-SCNC: 140 MMOL/L (ref 135–145)
TRIGL SERPL-MCNC: 120 MG/DL (ref 0–149)
TSH SERPL DL<=0.005 MIU/L-ACNC: 6.32 UIU/ML (ref 0.38–5.33)

## 2021-01-11 PROCEDURE — 82570 ASSAY OF URINE CREATININE: CPT

## 2021-01-11 PROCEDURE — 36415 COLL VENOUS BLD VENIPUNCTURE: CPT

## 2021-01-11 PROCEDURE — 80053 COMPREHEN METABOLIC PANEL: CPT

## 2021-01-11 PROCEDURE — 83036 HEMOGLOBIN GLYCOSYLATED A1C: CPT

## 2021-01-11 PROCEDURE — 82043 UR ALBUMIN QUANTITATIVE: CPT

## 2021-01-11 PROCEDURE — 84443 ASSAY THYROID STIM HORMONE: CPT

## 2021-01-11 PROCEDURE — 80061 LIPID PANEL: CPT

## 2021-01-11 NOTE — TELEPHONE ENCOUNTER
Spoke with the pt regarding his lab work. He prefers to make changes in his diet first and test again in June. Sent him the LDL handout so he can make changes in the diet. Consider Zetia if this does not work. Lab work sent to the pt's home.  KATE Finnegan.

## 2021-01-13 ENCOUNTER — TELEPHONE (OUTPATIENT)
Dept: VASCULAR LAB | Facility: MEDICAL CENTER | Age: 71
End: 2021-01-13

## 2021-01-13 DIAGNOSIS — Z13.29 SCREENING FOR THYROID DISORDER: ICD-10-CM

## 2021-01-14 ENCOUNTER — TELEPHONE (OUTPATIENT)
Dept: VASCULAR LAB | Facility: MEDICAL CENTER | Age: 71
End: 2021-01-14

## 2021-01-14 NOTE — TELEPHONE ENCOUNTER
Lm on pt's vm asking him to get the thyroid panel done per his PCP prior to the visit on 1/22. My number given for reference. Pt is to do the non fasting test only and save the fasting test for prior to the next visit with me. KATE Finnegan.

## 2021-01-20 ENCOUNTER — HOSPITAL ENCOUNTER (OUTPATIENT)
Dept: LAB | Facility: MEDICAL CENTER | Age: 71
End: 2021-01-20
Attending: NURSE PRACTITIONER
Payer: MEDICARE

## 2021-01-20 LAB
T4 FREE SERPL-MCNC: 1.09 NG/DL (ref 0.93–1.7)
TSH SERPL DL<=0.005 MIU/L-ACNC: 2.96 UIU/ML (ref 0.38–5.33)

## 2021-01-20 PROCEDURE — 84443 ASSAY THYROID STIM HORMONE: CPT

## 2021-01-20 PROCEDURE — 84439 ASSAY OF FREE THYROXINE: CPT

## 2021-01-20 PROCEDURE — 36415 COLL VENOUS BLD VENIPUNCTURE: CPT

## 2021-01-21 SDOH — HEALTH STABILITY: MENTAL HEALTH: HOW MANY STANDARD DRINKS CONTAINING ALCOHOL DO YOU HAVE ON A TYPICAL DAY?: 1 OR 2

## 2021-01-21 NOTE — PROGRESS NOTES
ESTABLISHED PATIENT PRE-VISIT PLANNING     01/21/21@8:35AM Called & Spoke to pt. Pt ok'd [employer]. [employer] active. Zoom char downloaded. Advised scheduled Friday, 1/22/21@1:30AM-.     While I was on the phone speaking with Patient, Patient  stated he was walking his dogs next to a busy street and had a fall and rolled. Patient declined medical assistance. Patient stated he was ok and not hurt and had walked back home by the end of the call.    Patient was contacted to complete PVP.     Note: Patient will not be contacted if there is no indication to call.     1.  Reviewed notes from the last few office visits within the medical group: Yes    2.  If any orders were placed at last visit or intended to be done for this visit (i.e. 6 mos follow-up), do we have Results/Consult Notes?         •  Labs - Labs ordered, NOT completed. Patient advised to complete prior to next appointment.  Note: If patient appointment is for lab review and patient did not complete labs, check with provider if OK to reschedule patient until labs completed.       •  Imaging - Imaging was not ordered at last office visit.       •  Referrals - Referral ordered, patient was seen and consult notes are in chart. Care Teams updated  YES.    3. Is this appointment scheduled as a Hospital Follow-Up? No    4.  Immunizations were updated in Epic using Reconcile Outside Information activity? Yes    5.  Patient is due for the following Health Maintenance Topics:   Health Maintenance Due   Topic Date Due   • Annual Wellness Visit  09/17/2020   • IMM ZOSTER VACCINES (3 of 3) 12/14/2020       6.  AHA (Pulse8) form printed for Provider? Email sent to [facility] requesting form

## 2021-01-22 ENCOUNTER — TELEMEDICINE (OUTPATIENT)
Dept: MEDICAL GROUP | Age: 71
End: 2021-01-22
Payer: MEDICARE

## 2021-01-22 VITALS
OXYGEN SATURATION: 97 % | HEART RATE: 72 BPM | BODY MASS INDEX: 25.11 KG/M2 | DIASTOLIC BLOOD PRESSURE: 84 MMHG | HEIGHT: 67 IN | WEIGHT: 160 LBS | SYSTOLIC BLOOD PRESSURE: 122 MMHG | TEMPERATURE: 97.6 F

## 2021-01-22 DIAGNOSIS — F34.1 DYSTHYMIA: ICD-10-CM

## 2021-01-22 DIAGNOSIS — E78.2 MIXED HYPERLIPIDEMIA: ICD-10-CM

## 2021-01-22 DIAGNOSIS — Q25.1 COARCTATION OF AORTA: ICD-10-CM

## 2021-01-22 DIAGNOSIS — R73.01 IMPAIRED FASTING GLUCOSE: ICD-10-CM

## 2021-01-22 DIAGNOSIS — G25.0 BENIGN ESSENTIAL TREMOR: ICD-10-CM

## 2021-01-22 DIAGNOSIS — F41.0 PANIC ATTACK: ICD-10-CM

## 2021-01-22 DIAGNOSIS — I10 ESSENTIAL HYPERTENSION: ICD-10-CM

## 2021-01-22 DIAGNOSIS — Z00.00 MEDICARE ANNUAL WELLNESS VISIT, SUBSEQUENT: Primary | ICD-10-CM

## 2021-01-22 PROBLEM — F43.0 ACUTE STRESS DISORDER: Status: RESOLVED | Noted: 2020-11-23 | Resolved: 2021-01-22

## 2021-01-22 PROBLEM — Z85.828 HISTORY OF SCC (SQUAMOUS CELL CARCINOMA) OF SKIN: Status: ACTIVE | Noted: 2019-10-07

## 2021-01-22 PROBLEM — E66.9 OBESITY (BMI 30-39.9): Status: RESOLVED | Noted: 2018-07-27 | Resolved: 2021-01-22

## 2021-01-22 PROCEDURE — G0439 PPPS, SUBSEQ VISIT: HCPCS | Mod: 95,CR | Performed by: FAMILY MEDICINE

## 2021-01-22 RX ORDER — MULTIVIT-MIN/IRON/FOLIC ACID/K 18-600-40
CAPSULE ORAL
COMMUNITY
Start: 2020-11-20 | End: 2023-07-20

## 2021-01-22 RX ORDER — CARVEDILOL 3.12 MG/1
3.12 TABLET ORAL 2 TIMES DAILY
Qty: 180 TAB | Refills: 1 | Status: SHIPPED | OUTPATIENT
Start: 2021-03-01 | End: 2021-07-19 | Stop reason: SDUPTHER

## 2021-01-22 ASSESSMENT — PATIENT HEALTH QUESTIONNAIRE - PHQ9
2. FEELING DOWN, DEPRESSED, IRRITABLE, OR HOPELESS: NOT AT ALL
9. THOUGHTS THAT YOU WOULD BE BETTER OFF DEAD, OR OF HURTING YOURSELF: NOT AT ALL
7. TROUBLE CONCENTRATING ON THINGS, SUCH AS READING THE NEWSPAPER OR WATCHING TELEVISION: NOT AT ALL
8. MOVING OR SPEAKING SO SLOWLY THAT OTHER PEOPLE COULD HAVE NOTICED. OR THE OPPOSITE, BEING SO FIGETY OR RESTLESS THAT YOU HAVE BEEN MOVING AROUND A LOT MORE THAN USUAL: NOT AT ALL
4. FEELING TIRED OR HAVING LITTLE ENERGY: NOT AT ALL
SUM OF ALL RESPONSES TO PHQ QUESTIONS 1-9: 0
3. TROUBLE FALLING OR STAYING ASLEEP OR SLEEPING TOO MUCH: NOT AT ALL
CLINICAL INTERPRETATION OF PHQ2 SCORE: 0
6. FEELING BAD ABOUT YOURSELF - OR THAT YOU ARE A FAILURE OR HAVE LET YOURSELF OR YOUR FAMILY DOWN: NOT AL ALL
1. LITTLE INTEREST OR PLEASURE IN DOING THINGS: NOT AT ALL
SUM OF ALL RESPONSES TO PHQ9 QUESTIONS 1 AND 2: 0
5. POOR APPETITE OR OVEREATING: NOT AT ALL

## 2021-01-22 ASSESSMENT — ENCOUNTER SYMPTOMS: GENERAL WELL-BEING: GOOD

## 2021-01-22 ASSESSMENT — ACTIVITIES OF DAILY LIVING (ADL): BATHING_REQUIRES_ASSISTANCE: 0

## 2021-01-22 ASSESSMENT — FIBROSIS 4 INDEX: FIB4 SCORE: 1.12

## 2021-01-22 NOTE — PROGRESS NOTES
Chief Complaint   Patient presents with   • Annual Exam     AWV     This evaluation was conducted via Zoom using secure and encrypted videoconferencing technology. The patient was in a private location in the state of [place].    The patient's identity was confirmed and verbal consent was obtained for this virtual visit.     HPI:  Juan is a 70 y.o. here for Medicare Annual Wellness Visit    Pt is doing well. He takes all medications as directed. He tolerates all medications well, no s/e reported.     He was recently started on Venlafaxine 37.5 mg qd for dysthymia. He states that his symptoms are better. He is able to enjoy life and does not let little things bother him.     Patient Active Problem List    Diagnosis Date Noted   • Dysthymia 11/23/2020   • Panic attack 03/24/2020   • History of SCC_R lateral neck_removed 9/2019 10/07/2019   • History of cardiac cath, 2015, no CAD 09/08/2018   • Personal history of malignant [facility]oplasm [facility]  07/12/2018   • Benign essential tremor 07/12/2017   • Impaired fasting blood sugar 07/01/2016   • S/P AVR (aortic valve replacement), bioprosthetic, 2016 05/13/2016   • Coarctation of aorta, CTA and ECHO every 2 years, f/u vascular medicine (Dr. Bloch) 04/26/2016   • Mixed hyperlipidemia 02/01/2013   • Essential hypertension 02/01/2013       Current Outpatient Medications   Medication Sig Dispense Refill   • Cholecalciferol (VITAMIN D) 50 MCG (2000 UT) Cap      • [START ON 3/1/2021] carvedilol (COREG) 3.125 MG Tab Take 1 Tab by mouth 2 times a day. 180 Tab 1   • venlafaxine XR (EFFEXOR XR) 37.5 MG CAPSULE SR 24 HR Take 1 Cap by mouth every day. 90 Cap 1   • atorvastatin (LIPITOR) 80 MG tablet Take 1 Tab by mouth every evening. 100 Tab 1   • lisinopril (PRINIVIL) 20 MG Tab Take 1 tablet by mouth once daily 100 Tab 1   • Omega-3 Fatty Acids (FISH OIL) 1000 MG Cap capsule Take 1,000 mg by mouth every day.     • aspirin (ASA) 81 MG Chew Tab chewable tablet Take 1  Tab by mouth every day. 100 Tab 11   • therapeutic multivitamin-minerals (THERAGRAN-M) TABS Take 1 Tab by mouth every day.       No current facility-administered medications for this visit.         Patient is taking medications as noted in medication list.  Current supplements as per medication list.     Allergies: Zoloft    Current social contact/activities: pt states none     Is patient current with immunizations? Yes.    He  reports that he quit smoking about 12 years ago. His smoking use included cigarettes. He has a 7.50 pack-year smoking history. He has never used smokeless tobacco. He reports current alcohol use. He reports that he does not use drugs.  Counseling given: Not Answered        DPA/Advanced directive: Patient does not have an Advanced Directive.  A packet and workshop information was given on Advanced Directives.    ROS:    Gait: Uses no assistive device   Ostomy: No   Other tubes: No   Amputations: No   Chronic oxygen use No   Last eye exam couple years ago   Wears hearing aids: No   : Denies any urinary leakage during the last 6 months    Screening:    Depression Screening    Little interest or pleasure in doing things?  0 - not at all  Feeling down, depressed, or hopeless? 0 - not at all    Screening for Cognitive Impairment    Three Minute Recall (river, jen, finger)  3/3    Draw clock face with all 12 numbers and set the hands to show 10 past 11.    Unable to complete due to VV    Fall Risk Assessment    Has the patient had two or more falls in the last year or any fall with injury in the last year?  Yes    Safety Assessment    Throw rugs on floor.  Yes  Handrails on all stairs.  Yes  Good lighting in all hallways.  Yes  Difficulty hearing.  No  Patient counseled about all safety risks that were identified.    Functional Assessment ADLs    Are there any barriers preventing you from cooking for yourself or meeting nutritional needs?  No.    Are there any barriers preventing you from  driving safely or obtaining transportation?  No.    Are there any barriers preventing you from using a telephone or calling for help?  No.    Are there any barriers preventing you from shopping?  No.    Are there any barriers preventing you from taking care of your own finances?  No.    Are there any barriers preventing you from managing your medications?    No.    Are there any barriers preventing you from showering, bathing or dressing yourself?  No.    Are you currently engaging in any exercise or physical activity?  Yes.     What is your perception of your health?  Good.    Health Maintenance Summary                IMM ZOSTER VACCINES Overdue 2020      Done 10/19/2020 Imm Admin: Zoster Vaccine Recombinant (RZV) (SHINGRIX)     Patient has more history with this topic...    COLOGUARD STOOL DNA Next Due 2021      Done 2018 COLOGUARD COLON CANCER SCREENING    Annual Wellness Visit Next Due 2022      Done 2021 Visit Dx: Medicare annual wellness visit, subsequent     Patient has more history with this topic...    IMM DTaP/Tdap/Td Vaccine Next Due 2025      Done 2015 Imm Admin: Tdap Vaccine          Patient Care Team:  Ekaterina Love M.D. as PCP - General (Family Medicine)  Michoacano Pantoja M.D. as Consulting Physician (Cardiology)  Bora Shook M.D. as Consulting Physician (Urology)  Michael J Bloch, M.D. as Consulting Physician (Cardiology)  Mehdi Anderson M.D. (Radiation Oncology)  [facility] (Inactive) as Consulting Physician  FELIBERTO Carmona as Attending Team Physician (Cardiology)      Social History     Tobacco Use   • Smoking status: Former Smoker     Packs/day: 0.50     Years: 15.00     Pack years: 7.50     Types: Cigarettes     Quit date: 2008     Years since quittin.7   • Smokeless tobacco: Never Used   Substance Use Topics   • Alcohol use: Yes     Alcohol/week: 0.0 oz     Frequency: 2-3 times a week     Drinks  "per session: 1 or 2     Binge frequency: Less than monthly     Comment: 0   • Drug use: No     Family History   Problem Relation Age of Onset   • Lung Disease Mother         Severe, chronic bronchitis   • Hyperlipidemia Mother    • GI Disease Mother         colon polyps   • Other Mother         heart valve/migraine   • Psychiatric Illness Father         Depression   • Psychiatric Illness Brother         depression   • Stroke Maternal Grandmother 55   • GI Disease Brother         colon polyps   • Hyperlipidemia Brother    • No Known Problems Maternal Grandfather    • No Known Problems Paternal Grandmother    • No Known Problems Paternal Grandfather    • Cancer Brother         Prostate   • Stroke Maternal Aunt 55     He  has a past medical history of Anxiety, Aortic coarctation, Aortic valve stenosis, Chest pain (4/23/2016), Depression, Headache(784.0), Heart murmur, Hyperlipidemia, Hypertension, Migraine, Muscle, jerky movements (uncontrolled) (1974), Nodular hyperplasia of prostate gland (4/11/2011), NSTEMI (non-ST elevated myocardial infarction) (HCC) (4/25/2016), Prostate cancer ([facility]), Prostate cancer ([facility]), RLQ abdominal pain (7/1/2016), and Tachycardia (4/23/2016). He also has no past medical history of GERD (gastroesophageal reflux disease), Thyroid disease, Tremor, essential, Ulcer, or Urinary tract infection, site not specified.   Past Surgical History:   Procedure Laterality Date   • AORTIC VALVE REPLACEMENT  4/28/2016    Procedure: AORTIC VALVE REPLACEMENT WITH JENNIFER;  Surgeon: Guero Bradford M.D.;  Location: SURGERY [facility];  Service:    • HERNIA REPAIR      Umbilical   • OTHER      prostate surgery         Exam:     /84 (BP Location: Left arm, Patient Position: Sitting, BP Cuff Size: Adult)   Pulse 72   Temp 36.4 °C (97.6 °F) (Temporal)   Ht 1.702 m (5' 7\")   Wt 72.6 kg (160 lb)   SpO2 97%  Body mass index is 25.06 kg/m².    Hearing excellent.    Dentition good  Alert, oriented in no " acute distress.  Eye contact is good, speech goal directed, affect calm    Assessment and Plan. The following treatment and monitoring plan is recommended:      1. Essential hypertension  Chronic, controlled with Coreg 3.125 mg BID and Lisinopril 20 mg qd, no s/e reported, will continue.    - carvedilol (COREG) 3.125 MG Tab; Take 1 Tab by mouth 2 times a day.  Dispense: 180 Tab; Refill: 1  - CBC WITH DIFFERENTIAL; Future  - Comp Metabolic Panel; Future  - MICROALBUMIN CREAT RATIO URINE; Future    2. Impaired fasting blood sugar  Improving with weight loss and lifestyle modification. His last A1C was 5.6   - Dietary/lifestyle modification and weight loss    - HEMOGLOBIN A1C; Future    3. Mixed hyperlipidemia  - cont Lipitor 80 mg qd per cardiology.   - Lipid Profile; Future  - dietary modification, exercise    - avoid alcohol, drugs, tobacco products     4. Dysthymia  Controlled with Venlafaxine 37.5 mg qd   No s/e reported, will cont.     5. Panic attack  Intermittent panic attacks secondary to increased life stressors and recent prison/lifestyle adjustment.   - Cont Xanax 0.25 mg PRN for symptoms.   - Risks, benefits, side effects, as well as potential health complications associated with Xanax 0.25 mg was previously discussed with patient. Appropriate counseling provided.      6. Benign essential tremor  Improving with Coreg, will cont to monitor.     7. Coarctation of aorta, CTA and ECHO every 2 years, f/u vascular medicine (Dr. Bloch)  Stable, working with vascular medicine, asymptomatic.   - f/u with vascular medicine.     8. Medicare annual wellness visit, subsequent  - Subsequent Annual Wellness Visit - Includes PPPS ()       Services suggested: No services needed at this time  Health Care Screening recommendations as per orders if indicated.  Referrals offered: PT/OT/Nutrition counseling/Behavioral Health/Smoking cessation as per orders if indicated.    Discussion today about general wellness and  lifestyle habits:    · Prevent falls and reduce trip hazards; Cautioned about securing or removing rugs.  · Have a working fire alarm and carbon monoxide detector;   · Engage in regular physical activity and social activities       Follow-up: Return in about 6 months (around 7/22/2021) for Multiple issues.

## 2021-02-07 ENCOUNTER — PATIENT MESSAGE (OUTPATIENT)
Dept: MEDICAL GROUP | Age: 71
End: 2021-02-07

## 2021-02-07 DIAGNOSIS — F43.0 ACUTE STRESS DISORDER: ICD-10-CM

## 2021-02-07 DIAGNOSIS — F34.1 DYSTHYMIA: ICD-10-CM

## 2021-02-08 RX ORDER — ALPRAZOLAM 0.25 MG/1
0.25 TABLET ORAL NIGHTLY PRN
Qty: 30 TAB | Refills: 0 | Status: SHIPPED | OUTPATIENT
Start: 2021-02-08 | End: 2021-03-10

## 2021-02-08 NOTE — TELEPHONE ENCOUNTER
From: Juan Berumen  To: Physician Ekaterina Love  Sent: 2/7/2021 10:18 AM PST  Subject: Non-Urgent Medical Question    Dr Love I would like to request a refill for the Alprazolam. I feel it would be better for me to just take it when I am feeling very stressed. I really dont to take another medication on a daily basis. I feel that I am on enough medications already. I actually did quite well with the Alprazolam. Thank you.

## 2021-03-03 DIAGNOSIS — Z23 NEED FOR VACCINATION: ICD-10-CM

## 2021-04-15 ENCOUNTER — TELEPHONE (OUTPATIENT)
Dept: VASCULAR LAB | Facility: MEDICAL CENTER | Age: 71
End: 2021-04-15

## 2021-04-15 DIAGNOSIS — Z95.2 S/P AVR (AORTIC VALVE REPLACEMENT): ICD-10-CM

## 2021-05-17 DIAGNOSIS — I10 ESSENTIAL HYPERTENSION: ICD-10-CM

## 2021-05-17 RX ORDER — LISINOPRIL 20 MG/1
20 TABLET ORAL DAILY
Qty: 100 TABLET | Refills: 2 | Status: SHIPPED | OUTPATIENT
Start: 2021-05-17 | End: 2022-03-29

## 2021-06-01 ENCOUNTER — HOSPITAL ENCOUNTER (OUTPATIENT)
Dept: CARDIOLOGY | Facility: MEDICAL CENTER | Age: 71
End: 2021-06-01
Attending: NURSE PRACTITIONER
Payer: MEDICARE

## 2021-06-01 DIAGNOSIS — Z95.2 S/P AVR (AORTIC VALVE REPLACEMENT): ICD-10-CM

## 2021-06-01 DIAGNOSIS — E78.2 MIXED HYPERLIPIDEMIA: ICD-10-CM

## 2021-06-01 LAB
LV EJECT FRACT  99904: 60
LV EJECT FRACT MOD 2C 99903: 73.52
LV EJECT FRACT MOD 4C 99902: 70.7
LV EJECT FRACT MOD BP 99901: 71.57

## 2021-06-01 PROCEDURE — 93306 TTE W/DOPPLER COMPLETE: CPT | Mod: 26 | Performed by: INTERNAL MEDICINE

## 2021-06-01 PROCEDURE — 93306 TTE W/DOPPLER COMPLETE: CPT

## 2021-06-01 RX ORDER — ATORVASTATIN CALCIUM 80 MG/1
TABLET, FILM COATED ORAL
Qty: 100 TABLET | Refills: 2 | Status: SHIPPED | OUTPATIENT
Start: 2021-06-01 | End: 2022-04-11

## 2021-06-02 ENCOUNTER — DOCUMENTATION (OUTPATIENT)
Dept: VASCULAR LAB | Facility: MEDICAL CENTER | Age: 71
End: 2021-06-02

## 2021-06-02 NOTE — PROGRESS NOTES
Echo reviewed  Normal EF  Well functioning bioprosthetic AVR.   Will not need repeat echo for a few years unless cards feels otherwise.  Will obtain CTA thoracic aorta in march 2022 per previous notes.     Michael Bloch, MD  [facility]

## 2021-07-09 ENCOUNTER — HOSPITAL ENCOUNTER (OUTPATIENT)
Dept: LAB | Facility: MEDICAL CENTER | Age: 71
End: 2021-07-09
Attending: UROLOGY
Payer: MEDICARE

## 2021-07-09 LAB — PSA SERPL-MCNC: <0.02 NG/ML (ref 0–4)

## 2021-07-09 PROCEDURE — 84153 ASSAY OF PSA TOTAL: CPT

## 2021-07-09 PROCEDURE — 36415 COLL VENOUS BLD VENIPUNCTURE: CPT

## 2021-07-14 ENCOUNTER — APPOINTMENT (OUTPATIENT)
Dept: VASCULAR LAB | Facility: MEDICAL CENTER | Age: 71
End: 2021-07-14
Attending: NURSE PRACTITIONER
Payer: MEDICARE

## 2021-07-15 ENCOUNTER — HOSPITAL ENCOUNTER (OUTPATIENT)
Dept: LAB | Facility: MEDICAL CENTER | Age: 71
End: 2021-07-15
Attending: FAMILY MEDICINE
Payer: MEDICARE

## 2021-07-15 ENCOUNTER — HOSPITAL ENCOUNTER (OUTPATIENT)
Dept: LAB | Facility: MEDICAL CENTER | Age: 71
End: 2021-07-15
Attending: NURSE PRACTITIONER
Payer: MEDICARE

## 2021-07-15 DIAGNOSIS — I10 ESSENTIAL HYPERTENSION: ICD-10-CM

## 2021-07-15 DIAGNOSIS — E78.2 MIXED HYPERLIPIDEMIA: ICD-10-CM

## 2021-07-15 DIAGNOSIS — R73.01 IMPAIRED FASTING GLUCOSE: ICD-10-CM

## 2021-07-15 LAB
ALBUMIN SERPL BCP-MCNC: 4.2 G/DL (ref 3.2–4.9)
ALBUMIN/GLOB SERPL: 1.6 G/DL
ALP SERPL-CCNC: 86 U/L (ref 30–99)
ALT SERPL-CCNC: 31 U/L (ref 2–50)
ANION GAP SERPL CALC-SCNC: 10 MMOL/L (ref 7–16)
AST SERPL-CCNC: 27 U/L (ref 12–45)
BASOPHILS # BLD AUTO: 0.7 % (ref 0–1.8)
BASOPHILS # BLD: 0.04 K/UL (ref 0–0.12)
BILIRUB SERPL-MCNC: 0.8 MG/DL (ref 0.1–1.5)
BUN SERPL-MCNC: 12 MG/DL (ref 8–22)
CALCIUM SERPL-MCNC: 9.1 MG/DL (ref 8.5–10.5)
CHLORIDE SERPL-SCNC: 104 MMOL/L (ref 96–112)
CHOLEST SERPL-MCNC: 164 MG/DL (ref 100–199)
CHOLEST SERPL-MCNC: 165 MG/DL (ref 100–199)
CO2 SERPL-SCNC: 25 MMOL/L (ref 20–33)
CREAT SERPL-MCNC: 1.02 MG/DL (ref 0.5–1.4)
CREAT UR-MCNC: 90.24 MG/DL
EOSINOPHIL # BLD AUTO: 0.09 K/UL (ref 0–0.51)
EOSINOPHIL NFR BLD: 1.6 % (ref 0–6.9)
ERYTHROCYTE [DISTWIDTH] IN BLOOD BY AUTOMATED COUNT: 43.2 FL (ref 35.9–50)
EST. AVERAGE GLUCOSE BLD GHB EST-MCNC: 126 MG/DL
GLOBULIN SER CALC-MCNC: 2.6 G/DL (ref 1.9–3.5)
GLUCOSE SERPL-MCNC: 97 MG/DL (ref 65–99)
HBA1C MFR BLD: 6 % (ref 4–5.6)
HCT VFR BLD AUTO: 49.5 % (ref 42–52)
HDLC SERPL-MCNC: 38 MG/DL
HDLC SERPL-MCNC: 38 MG/DL
HGB BLD-MCNC: 16.3 G/DL (ref 14–18)
IMM GRANULOCYTES # BLD AUTO: 0.01 K/UL (ref 0–0.11)
IMM GRANULOCYTES NFR BLD AUTO: 0.2 % (ref 0–0.9)
LDLC SERPL CALC-MCNC: 104 MG/DL
LDLC SERPL CALC-MCNC: 105 MG/DL
LYMPHOCYTES # BLD AUTO: 2 K/UL (ref 1–4.8)
LYMPHOCYTES NFR BLD: 35.4 % (ref 22–41)
MCH RBC QN AUTO: 31 PG (ref 27–33)
MCHC RBC AUTO-ENTMCNC: 32.9 G/DL (ref 33.7–35.3)
MCV RBC AUTO: 94.3 FL (ref 81.4–97.8)
MICROALBUMIN UR-MCNC: <1.2 MG/DL
MICROALBUMIN/CREAT UR: NORMAL MG/G (ref 0–30)
MONOCYTES # BLD AUTO: 0.55 K/UL (ref 0–0.85)
MONOCYTES NFR BLD AUTO: 9.7 % (ref 0–13.4)
NEUTROPHILS # BLD AUTO: 2.96 K/UL (ref 1.82–7.42)
NEUTROPHILS NFR BLD: 52.4 % (ref 44–72)
NRBC # BLD AUTO: 0 K/UL
NRBC BLD-RTO: 0 /100 WBC
PLATELET # BLD AUTO: 270 K/UL (ref 164–446)
PMV BLD AUTO: 9.3 FL (ref 9–12.9)
POTASSIUM SERPL-SCNC: 4.5 MMOL/L (ref 3.6–5.5)
PROT SERPL-MCNC: 6.8 G/DL (ref 6–8.2)
RBC # BLD AUTO: 5.25 M/UL (ref 4.7–6.1)
SODIUM SERPL-SCNC: 139 MMOL/L (ref 135–145)
T4 FREE SERPL-MCNC: 1.21 NG/DL (ref 0.93–1.7)
TRIGL SERPL-MCNC: 108 MG/DL (ref 0–149)
TRIGL SERPL-MCNC: 109 MG/DL (ref 0–149)
TSH SERPL DL<=0.005 MIU/L-ACNC: 8.34 UIU/ML (ref 0.38–5.33)
WBC # BLD AUTO: 5.7 K/UL (ref 4.8–10.8)

## 2021-07-15 PROCEDURE — 80053 COMPREHEN METABOLIC PANEL: CPT

## 2021-07-15 PROCEDURE — 82043 UR ALBUMIN QUANTITATIVE: CPT

## 2021-07-15 PROCEDURE — 85025 COMPLETE CBC W/AUTO DIFF WBC: CPT

## 2021-07-15 PROCEDURE — 83036 HEMOGLOBIN GLYCOSYLATED A1C: CPT

## 2021-07-15 PROCEDURE — 80061 LIPID PANEL: CPT | Mod: 91

## 2021-07-15 PROCEDURE — 84443 ASSAY THYROID STIM HORMONE: CPT

## 2021-07-15 PROCEDURE — 80061 LIPID PANEL: CPT

## 2021-07-15 PROCEDURE — 36415 COLL VENOUS BLD VENIPUNCTURE: CPT

## 2021-07-15 PROCEDURE — 82570 ASSAY OF URINE CREATININE: CPT

## 2021-07-15 PROCEDURE — 84439 ASSAY OF FREE THYROXINE: CPT

## 2021-07-16 ENCOUNTER — TELEPHONE (OUTPATIENT)
Dept: MEDICAL GROUP | Age: 71
End: 2021-07-16

## 2021-07-16 ENCOUNTER — OFFICE VISIT (OUTPATIENT)
Dept: VASCULAR LAB | Facility: MEDICAL CENTER | Age: 71
End: 2021-07-16
Attending: FAMILY MEDICINE
Payer: MEDICARE

## 2021-07-16 VITALS
HEIGHT: 67 IN | HEART RATE: 70 BPM | DIASTOLIC BLOOD PRESSURE: 70 MMHG | SYSTOLIC BLOOD PRESSURE: 131 MMHG | WEIGHT: 174 LBS | BODY MASS INDEX: 27.31 KG/M2

## 2021-07-16 DIAGNOSIS — I10 ESSENTIAL HYPERTENSION: ICD-10-CM

## 2021-07-16 DIAGNOSIS — E78.2 MIXED HYPERLIPIDEMIA: ICD-10-CM

## 2021-07-16 DIAGNOSIS — Q25.1 COARCTATION OF AORTA: ICD-10-CM

## 2021-07-16 DIAGNOSIS — Z95.2 S/P AVR (AORTIC VALVE REPLACEMENT): ICD-10-CM

## 2021-07-16 DIAGNOSIS — I71.20 THORACIC AORTIC ANEURYSM WITHOUT RUPTURE (HCC): ICD-10-CM

## 2021-07-16 PROCEDURE — 99214 OFFICE O/P EST MOD 30 MIN: CPT | Performed by: FAMILY MEDICINE

## 2021-07-16 PROCEDURE — 99212 OFFICE O/P EST SF 10 MIN: CPT

## 2021-07-16 ASSESSMENT — ENCOUNTER SYMPTOMS
LOSS OF CONSCIOUSNESS: 0
SEIZURES: 0
VOMITING: 0
NAUSEA: 0
NERVOUS/ANXIOUS: 0
SPEECH CHANGE: 0
SHORTNESS OF BREATH: 0
BLOOD IN STOOL: 0
FOCAL WEAKNESS: 0
HEADACHES: 0
DIZZINESS: 0
HEMOPTYSIS: 0
WHEEZING: 0
TREMORS: 0
WEIGHT LOSS: 0
ABDOMINAL PAIN: 0
BRUISES/BLEEDS EASILY: 1
DIARRHEA: 0
MYALGIAS: 0
SENSORY CHANGE: 0
CLAUDICATION: 0
CHILLS: 0
WEAKNESS: 0
FEVER: 0
DEPRESSION: 0
PALPITATIONS: 0
COUGH: 0

## 2021-07-16 ASSESSMENT — FIBROSIS 4 INDEX: FIB4 SCORE: 1.26

## 2021-07-16 NOTE — TELEPHONE ENCOUNTER
ESTABLISHED PATIENT PRE-VISIT PLANNING   Friday, 07/16/2021@4:19PM:    Patient was NOT contacted to complete PVP.     Note: Patient will not be contacted if there is no indication to call.     1.  Reviewed notes from the last few office visits within the medical group: Yes    2.  If any orders were placed at last visit or intended to be done for this visit (i.e. 6 mos follow-up), do we have Results/Consult Notes?         •  Labs - Labs ordered, completed on 07/15/2021 and results are in chart.  Note: If patient appointment is for lab review and patient did not complete labs, check with provider if OK to reschedule patient until labs completed.       •  Imaging - Imaging ordered, completed and results are in chart.       •  Referrals - No referrals were ordered at last office visit.    3. Is this appointment scheduled as a Hospital Follow-Up? No    4.  Immunizations were updated in Epic using Reconcile Outside Information activity? Yes    5.  Patient is due for the following Health Maintenance Topics:   Health Maintenance Due   Topic Date Due   • IMM ZOSTER VACCINES (3 of 3) 12/14/2020       6.  AHA (Pulse8) form printed for Provider? Yes

## 2021-07-16 NOTE — PROGRESS NOTES
Follow Up VASCULAR VISIT  Subjective:   Juan Berumen is a 70 y.o. male who presents today 10/28/20 for   Chief Complaint   Patient presents with   • Follow-Up   Here for f/u of coarctation, valvular heart disease, hypertension, and dyslipidemia      HPI:    Coarctation, s/p AVR:   Echo completed.  No symptoms.  Feeling well.   CTA 3/2020   Seeing cardiology annually     HTN:  BP at home usually  120-130/80   Tolerating all meds   Denies fatigue     HLD:  On atorvastatin 80mg-- no ADRs      On ASA-- no bleeding issues      Current Outpatient Medications:   •  atorvastatin, TAKE 1 TABLET BY MOUTH ONCE DAILY IN THE EVENING, Taking  •  lisinopril, 20 mg, Oral, DAILY, Taking  •  Vitamin D, , Taking  •  carvedilol, 3.125 mg, Oral, BID, Taking  •  venlafaxine XR, 37.5 mg, Oral, DAILY, Taking  •  fish oil, 1,000 mg, Oral, DAILY, Taking  •  aspirin, 81 mg, Oral, DAILY, Taking  •  therapeutic multivitamin-minerals, 1 tablet, Oral, DAILY, Taking     Social History     Tobacco Use   Smoking Status Former Smoker   • Packs/day: 0.50   • Years: 15.00   • Pack years: 7.50   • Types: Cigarettes   • Quit date: 2008   • Years since quittin.2   Smokeless Tobacco Never Used     DIET AND EXERCISE:  Weight Change: up about 10 pounds  BMI Readings from Last 5 Encounters:   21 27.25 kg/m²   21 25.06 kg/m²   20 26.31 kg/m²   10/28/20 27.10 kg/m²   20 26.00 kg/m²      Diet: common adult  Exercise: moderate regular exercise program     Review of Systems   Constitutional: Negative for chills, fever, malaise/fatigue and weight loss.   HENT: Negative for nosebleeds.    Respiratory: Negative for cough, hemoptysis, shortness of breath and wheezing.    Cardiovascular: Negative for chest pain, palpitations, claudication and leg swelling.   Gastrointestinal: Negative for abdominal pain, blood in stool, diarrhea, nausea and vomiting.   Genitourinary: Negative for hematuria.   Musculoskeletal: Negative for joint pain  "and myalgias.   Skin: Negative for itching and rash.   Neurological: Negative for dizziness, tremors, sensory change, speech change, focal weakness, seizures, loss of consciousness, weakness and headaches.   Endo/Heme/Allergies: Bruises/bleeds easily.   Psychiatric/Behavioral: Negative for depression. The patient is not nervous/anxious.       Objective:     Vitals:    07/16/21 1537   BP: 131/70   BP Location: Right arm   Patient Position: Sitting   BP Cuff Size: Adult   Pulse: 70   Weight: 78.9 kg (174 lb)   Height: 1.702 m (5' 7\")      BP Readings from Last 5 Encounters:   07/16/21 131/70   01/22/21 122/84   11/23/20 126/64   10/28/20 110/64   08/05/20 134/72      Body mass index is 27.25 kg/m².  Physical Exam  Vitals reviewed.   Constitutional:       Appearance: He is well-developed.   HENT:      Head: Normocephalic and atraumatic.   Eyes:      Conjunctiva/sclera: Conjunctivae normal.      Pupils: Pupils are equal, round, and reactive to light.   Neck:      Thyroid: No thyromegaly.      Vascular: No JVD.   Cardiovascular:      Rate and Rhythm: Normal rate and regular rhythm.      Pulses:           Radial pulses are 2+ on the right side and 2+ on the left side.        Dorsalis pedis pulses are 2+ on the right side and 2+ on the left side.        Posterior tibial pulses are 2+ on the right side and 2+ on the left side.      Heart sounds: Murmur heard.   No friction rub. No gallop.    Pulmonary:      Effort: Pulmonary effort is normal. No respiratory distress.      Breath sounds: Normal breath sounds. No wheezing or rales.   Musculoskeletal:      Cervical back: Normal range of motion and neck supple.   Lymphadenopathy:      Cervical: No cervical adenopathy.   Skin:     General: Skin is warm and dry.   Neurological:      Mental Status: He is alert and oriented to person, place, and time.      Cranial Nerves: No cranial nerve deficit.   Psychiatric:         Behavior: Behavior normal.       Lab Results   Component Value " Date    CHOLSTRLTOT 165 07/15/2021     (H) 07/15/2021    HDL 38 (A) 07/15/2021    TRIGLYCERIDE 109 07/15/2021      Lab Results   Component Value Date    SODIUM 139 07/15/2021    POTASSIUM 4.5 07/15/2021    CHLORIDE 104 07/15/2021    CO2 25 07/15/2021    GLUCOSE 97 07/15/2021    BUN 12 07/15/2021    CREATININE 1.02 07/15/2021    IFAFRICA >60 07/15/2021    IFNOTAFR >60 07/15/2021      Lab Results   Component Value Date    WBC 5.7 07/15/2021    RBC 5.25 07/15/2021    HEMOGLOBIN 16.3 07/15/2021    HEMATOCRIT 49.5 07/15/2021    MCV 94.3 07/15/2021    MCH 31.0 07/15/2021    MCHC 32.9 (L) 07/15/2021    MPV 9.3 07/15/2021      CTA chest 3/2020   Coarctations aorta similar to previous findings.  Interval sternotomy and placement aortic valvular prosthesis. The ascending aorta measures 3.1 x 3.4 cm as compared to 3.3 x 3.6 cm previously.  Hyperexpanded lungs and scattered linear atelectasis. No consolidation or pleural effusions.    Echo 6/1/21  Left ventricular ejection fraction is visually estimated to be 60%.  Moderately dilated left atrium.  Mild mitral annular calcification.  Known bioprosthetic aortic valve that is functioning normally: Vmax is   2.35 m/s, mean gradient 12 mmHg.  Normal estimated right atrial pressure.   Compared to the images of the prior study done 6/24/2020, no   significant change.    Medical Decision Making:  Today's Assessment / Status / Plan:     1. Essential hypertension     2. Coarctation of aorta, CTA and ECHO every 2 years, f/u vascular medicine (Dr. Bloch)     3. S/P AVR (aortic valve replacement), bioprosthetic, 2016     4. Thoracic aortic aneurysm without rupture (HCC)     5. Mixed hyperlipidemia       Patient Type: Secondary Prevention    Etiology of Established CVD if Present:     1.  Aortic coarctation, asymptomatic, long-standing, mild aneurysmal dilatation distal to the lesion, appears to have a familial component  - Medical management as above  - Continue surveillance imaging  with CTA/MRA every two years  - Again recommend a screening echocardiogram for all first-degree relatives  - Since he does not have a true aortic aneurysm or dissection I think that genetic testing would be of low yield and not indicated at present  - repeat CTA 3/2022, then Q2yr if stable     2.  Aortic valve disease status post bovine aVR in 2016, associated with aortic coarctation as above. No mention that his valve was bicuspid. Mild perivalvular leak   Stable echo 6/2021   - Medical management as above  - continue to partner with cardiology, defer to cardio for surveillance echo ordering     Lipid Management:   Qualifies for Statin Therapy Based on 2013 ACC/AHA Guidelines: yes  Calculated 10-Year Risk of ASCVD: N/A  Currently on Statin: Yes  -Patient with vascular disease, although not atherosclerotic - nonetheless would like to treat aggressively  Plan:  - continue atorva 80mg daily   - Consider addition of zetia depending on results  - TLC per below  - update labs in 6-12mo     Blood Pressure Management:   Acc/aha bp goal <130/80  BP seems well controlled both in office and at home  PCP changed metoprolol to carvedilol in favor of lower dose due to HR <50  HR now in 50's  - Continue carvedilol for now  - Monitor HR daily-- if HR <50, call our office  - Continue lisinopril  - Continue home blood pressure monitoring    Glycemic Status: Prediabetes  Lab Results   Component Value Date    HBA1C 6.0 (H) 07/15/2021      Plan:  - continue healthy diet, weight reduction, daily physical activity   - consider metformin up to 850mg BID to slow or offset progression to T2D as per DPP trial   - monitor labs Q6-12mo    Anti-Platelet/Anti-Coagulant Tx: yes  Patient has completed 3 month course of anticoagulation after valve replacement   - Continue indefinite aspirin    Smoking:  reports that he quit smoking about 13 years ago. His smoking use included cigarettes. He has a 7.50 pack-year smoking history. He has never used  smokeless tobacco.   continued complete avoidance of all tobacco products     Physical Activity: continue healthy activity to improve CV fitness, see care instructions for additional details     Weight Management and Nutrition: Heart healthy dietary patterns were discussed with patient at this visit including DASH, Mediterranean diet, low sodium and/or as outlined in care instructions    Other:  None     Instructed to follow-up with PCP for remainder of adult medical needs: yes  We will partner with other providers in the management of established vascular disease and cardiometabolic risk factors.    Studies to Be Obtained:    1.  Echocardiogram June 2023-24, or as directed by cardiology   2.  CTA thoracic aorta March 2022 - not ordered, aprn to track     Labs to Be Obtained: as above    Follow up in: march 2022     Yan Tang M.D.   [facility]   [facility]   [phone]   Cc:  RHEA Love

## 2021-07-19 ENCOUNTER — OFFICE VISIT (OUTPATIENT)
Dept: MEDICAL GROUP | Age: 71
End: 2021-07-19
Payer: MEDICARE

## 2021-07-19 VITALS
TEMPERATURE: 97.7 F | BODY MASS INDEX: 27.62 KG/M2 | SYSTOLIC BLOOD PRESSURE: 124 MMHG | HEIGHT: 67 IN | OXYGEN SATURATION: 94 % | WEIGHT: 176 LBS | HEART RATE: 52 BPM | DIASTOLIC BLOOD PRESSURE: 52 MMHG

## 2021-07-19 DIAGNOSIS — I10 ESSENTIAL HYPERTENSION: Primary | ICD-10-CM

## 2021-07-19 DIAGNOSIS — G25.0 BENIGN ESSENTIAL TREMOR: ICD-10-CM

## 2021-07-19 DIAGNOSIS — R73.01 IMPAIRED FASTING GLUCOSE: ICD-10-CM

## 2021-07-19 DIAGNOSIS — F41.0 PANIC ATTACK: ICD-10-CM

## 2021-07-19 DIAGNOSIS — F34.1 DYSTHYMIA: ICD-10-CM

## 2021-07-19 DIAGNOSIS — E78.2 MIXED HYPERLIPIDEMIA: ICD-10-CM

## 2021-07-19 DIAGNOSIS — E03.8 SUBCLINICAL HYPOTHYROIDISM: ICD-10-CM

## 2021-07-19 PROCEDURE — 99214 OFFICE O/P EST MOD 30 MIN: CPT | Performed by: FAMILY MEDICINE

## 2021-07-19 RX ORDER — CARVEDILOL 3.12 MG/1
3.12 TABLET ORAL 2 TIMES DAILY
Qty: 180 TABLET | Refills: 1 | Status: SHIPPED | OUTPATIENT
Start: 2021-07-19 | End: 2021-10-06

## 2021-07-19 RX ORDER — PRIMIDONE 50 MG/1
50 TABLET ORAL DAILY
Qty: 90 TABLET | Refills: 3 | Status: SHIPPED | OUTPATIENT
Start: 2021-07-19 | End: 2022-01-20

## 2021-07-19 RX ORDER — ALPRAZOLAM 0.25 MG/1
0.25 TABLET ORAL NIGHTLY PRN
Qty: 30 TABLET | Refills: 0 | Status: SHIPPED | OUTPATIENT
Start: 2021-07-19 | End: 2021-08-18

## 2021-07-19 ASSESSMENT — FIBROSIS 4 INDEX: FIB4 SCORE: 1.26

## 2021-07-19 NOTE — PROGRESS NOTES
Subjective:   CC: hypertension follow up     HPI:     Juan Berumen is a 71 y.o. male, established patient of the clinic, presents with the following concerns:     Pt has chronic hypertension, hyperlipidemia, prediabetes, dysthymia, panic attacks. He is taking all medications as directed. He tried Venlafaxine for mood disorders, but states that he had side effects with Venlafaxine. Therefore, he has stopped Venlafaxine. He takes Xanax 0.25 mg PRN for symptoms. Negative hx of drugs or alcohol abuse. He's retired and walks his dogs regularly. His most recent labs showed mild elevated TSH with normal T4.     He complained of worsening essential tremors. He wishes to start medication for this condition as it is becoming bothersome to pt.     Current medicines (including changes today)  Current Outpatient Medications   Medication Sig Dispense Refill   • ALPRAZolam (XANAX) 0.25 MG Tab Take 1 tablet by mouth at bedtime as needed for Anxiety for up to 30 days. 30 tablet 0   • carvedilol (COREG) 3.125 MG Tab Take 1 tablet by mouth 2 times a day. 180 tablet 1   • primidone (MYSOLINE) 50 MG Tab Take 1 tablet by mouth every day. 90 tablet 3   • atorvastatin (LIPITOR) 80 MG tablet TAKE 1 TABLET BY MOUTH ONCE DAILY IN THE EVENING 100 tablet 2   • lisinopril (PRINIVIL) 20 MG Tab Take 1 tablet by mouth every day. 100 tablet 2   • Cholecalciferol (VITAMIN D) 50 MCG (2000 UT) Cap      • Omega-3 Fatty Acids (FISH OIL) 1000 MG Cap capsule Take 1,000 mg by mouth every day.     • aspirin (ASA) 81 MG Chew Tab chewable tablet Take 1 Tab by mouth every day. 100 Tab 11   • therapeutic multivitamin-minerals (THERAGRAN-M) TABS Take 1 Tab by mouth every day.       No current facility-administered medications for this visit.     He  has a past medical history of Anxiety, Aortic coarctation, Aortic valve stenosis, Chest pain (4/23/2016), Depression, Headache(784.0), Heart murmur, Hyperlipidemia, Hypertension, Migraine, Muscle, jerky  "movements (uncontrolled) (1974), Nodular hyperplasia of prostate gland (4/11/2011), NSTEMI (non-ST elevated myocardial infarction) (HCC) (4/25/2016), Prostate cancer (HCC), Prostate cancer (HCC), RLQ abdominal pain (7/1/2016), and Tachycardia (4/23/2016). He also has no past medical history of GERD (gastroesophageal reflux disease), Thyroid disease, Tremor, essential, Ulcer, or Urinary tract infection, site not specified.    I personally reviewed patient's problem list, allergies, medications, family hx, social hx with patient and update EPIC.     REVIEW OF SYSTEMS:  CONSTITUTIONAL:  Denies night sweats, fatigue, malaise, lethargy, fever or chills.  RESPIRATORY:  Denies cough, wheeze, hemoptysis, or shortness of breath.  CARDIOVASCULAR:  Denies chest pains, palpitations, pedal edema     Objective:     /52 (BP Location: Right arm, Patient Position: Sitting, BP Cuff Size: Adult)   Pulse (!) 52   Temp 36.5 °C (97.7 °F) (Temporal)   Ht 1.702 m (5' 7\")   Wt 79.8 kg (176 lb)   SpO2 94%  Body mass index is 27.57 kg/m².    Physical Exam:  Constitutional: awake, alert, in no distress.  Skin: Warm, dry, good turgor, no rashes, bruises, ulcers in visible areas.  Eye: conjunctiva clear, lids neg for edema or lesions.  ENMT: TM and auditory canals within normal limits   Neck: Trachea midline, no masses, no thyromegaly. No cervical or supraclavicular lymphadenopathy  Respiratory: Unlabored respiratory effort, lungs clear to auscultation, no wheezes, no rales.  Cardiovascular: Normal S1, S2, no murmur, no pedal edema.  Psych: Oriented x3, affect and mood wnl, intact judgement and insight.   Neuro: action tremors noted on exam. The rest of neurological exam unremarkable.     Assessment and Plan:   The following treatment plan was discussed    1. Essential hypertension  Chronic, controlled with Coreg 3.125 mg BID as well as Lisinopril 20 mg qd daily, no s/e reported, will cont.   - Cont Lisinopril 20 mg qd   - carvedilol " (COREG) 3.125 MG Tab; Take 1 tablet by mouth 2 times a day.  Dispense: 180 tablet; Refill: 1  - CBC WITH DIFFERENTIAL; Future  - Comp Metabolic Panel; Future  - MICROALBUMIN CREAT RATIO URINE; Future    2. Mixed hyperlipidemia  Chronic, controlled with Lipitor 80 mg qd, no s/e reported, will continue.    - dietary modification, exercise, and weight loss.   - avoid alcohol, drugs, tobacco products     3. Impaired fasting blood sugar  Most recent A1C was 6.0  - Dietary/lifestyle modification and weight loss    - HEMOGLOBIN A1C; Future    4. Dysthymia  5. Panic attack  - ALPRAZolam (XANAX) 0.25 MG Tab; Take 1 tablet by mouth at bedtime as needed for Anxiety for up to 30 days.  Dispense: 30 tablet; Refill: 0  - Risks, benefits, side effects, as well as potential health complications associated with Xanax 0.25 mg PRN was previously discussed with patient. Appropriate counseling provided.      6. Subclinical hypothyroidism  - TSH; Future  - Shingles Vaccine (SHINGRIX); Future    7. Benign essential tremor  - primidone (MYSOLINE) 50 MG Tab; Take 1 tablet by mouth every day.  Dispense: 90 tablet; Refill: 3      Ly ABNER Love M.D.      Followup: Return in about 6 months (around 1/19/2022) for Annual wellness visit.    Please note that this dictation was created using voice recognition software. I have made every reasonable attempt to correct obvious errors, but I expect that there are errors of grammar and possibly content that I did not discover before finalizing the note.

## 2021-07-27 ENCOUNTER — NON-PROVIDER VISIT (OUTPATIENT)
Dept: MEDICAL GROUP | Age: 71
End: 2021-07-27
Payer: MEDICARE

## 2021-07-27 ENCOUNTER — PATIENT MESSAGE (OUTPATIENT)
Dept: HEALTH INFORMATION MANAGEMENT | Facility: OTHER | Age: 71
End: 2021-07-27

## 2021-07-27 DIAGNOSIS — E03.8 SUBCLINICAL HYPOTHYROIDISM: ICD-10-CM

## 2021-07-27 PROCEDURE — 90471 IMMUNIZATION ADMIN: CPT | Performed by: FAMILY MEDICINE

## 2021-07-27 PROCEDURE — 90750 HZV VACC RECOMBINANT IM: CPT | Performed by: FAMILY MEDICINE

## 2021-07-27 NOTE — PROGRESS NOTES
"Juan Berumen is a 71 y.o. male here for a non-provider visit for:   SHINGRIX (Shingles)    Reason for immunization: continue or complete series started at the office  Immunization records indicate need for vaccine: Yes, confirmed with Epic  Minimum interval has been met for this vaccine: Yes  ABN completed: Not Indicated    VIS Dated   was given to patient: Yes  All IAC Questionnaire questions were answered \"No.\"    Patient tolerated injection and no adverse effects were observed or reported: Yes    Pt scheduled for next dose in series: No  "

## 2021-09-27 ENCOUNTER — PATIENT MESSAGE (OUTPATIENT)
Dept: HEALTH INFORMATION MANAGEMENT | Facility: OTHER | Age: 71
End: 2021-09-27

## 2021-10-05 DIAGNOSIS — I10 ESSENTIAL HYPERTENSION: ICD-10-CM

## 2021-10-06 RX ORDER — CARVEDILOL 3.12 MG/1
TABLET ORAL
Qty: 180 TABLET | Refills: 1 | Status: SHIPPED | OUTPATIENT
Start: 2021-10-06 | End: 2022-04-15 | Stop reason: SDUPTHER

## 2021-12-24 ENCOUNTER — APPOINTMENT (OUTPATIENT)
Dept: URGENT CARE | Facility: CLINIC | Age: 71
End: 2021-12-24
Payer: MEDICARE

## 2022-01-18 SDOH — HEALTH STABILITY: PHYSICAL HEALTH: ON AVERAGE, HOW MANY MINUTES DO YOU ENGAGE IN EXERCISE AT THIS LEVEL?: 30 MIN

## 2022-01-18 SDOH — HEALTH STABILITY: PHYSICAL HEALTH: ON AVERAGE, HOW MANY DAYS PER WEEK DO YOU ENGAGE IN MODERATE TO STRENUOUS EXERCISE (LIKE A BRISK WALK)?: 7 DAYS

## 2022-01-18 SDOH — ECONOMIC STABILITY: HOUSING INSECURITY
IN THE LAST 12 MONTHS, WAS THERE A TIME WHEN YOU DID NOT HAVE A STEADY PLACE TO SLEEP OR SLEPT IN A SHELTER (INCLUDING NOW)?: NO

## 2022-01-18 SDOH — ECONOMIC STABILITY: INCOME INSECURITY: IN THE LAST 12 MONTHS, WAS THERE A TIME WHEN YOU WERE NOT ABLE TO PAY THE MORTGAGE OR RENT ON TIME?: NO

## 2022-01-18 SDOH — ECONOMIC STABILITY: INCOME INSECURITY: HOW HARD IS IT FOR YOU TO PAY FOR THE VERY BASICS LIKE FOOD, HOUSING, MEDICAL CARE, AND HEATING?: NOT VERY HARD

## 2022-01-18 SDOH — ECONOMIC STABILITY: FOOD INSECURITY: WITHIN THE PAST 12 MONTHS, YOU WORRIED THAT YOUR FOOD WOULD RUN OUT BEFORE YOU GOT MONEY TO BUY MORE.: NEVER TRUE

## 2022-01-18 SDOH — ECONOMIC STABILITY: HOUSING INSECURITY: IN THE LAST 12 MONTHS, HOW MANY PLACES HAVE YOU LIVED?: 1

## 2022-01-18 SDOH — ECONOMIC STABILITY: FOOD INSECURITY: WITHIN THE PAST 12 MONTHS, THE FOOD YOU BOUGHT JUST DIDN'T LAST AND YOU DIDN'T HAVE MONEY TO GET MORE.: NEVER TRUE

## 2022-01-18 SDOH — ECONOMIC STABILITY: TRANSPORTATION INSECURITY
IN THE PAST 12 MONTHS, HAS LACK OF TRANSPORTATION KEPT YOU FROM MEETINGS, WORK, OR FROM GETTING THINGS NEEDED FOR DAILY LIVING?: NO

## 2022-01-18 SDOH — ECONOMIC STABILITY: TRANSPORTATION INSECURITY
IN THE PAST 12 MONTHS, HAS LACK OF RELIABLE TRANSPORTATION KEPT YOU FROM MEDICAL APPOINTMENTS, MEETINGS, WORK OR FROM GETTING THINGS NEEDED FOR DAILY LIVING?: NO

## 2022-01-18 SDOH — HEALTH STABILITY: MENTAL HEALTH
STRESS IS WHEN SOMEONE FEELS TENSE, NERVOUS, ANXIOUS, OR CAN'T SLEEP AT NIGHT BECAUSE THEIR MIND IS TROUBLED. HOW STRESSED ARE YOU?: TO SOME EXTENT

## 2022-01-18 SDOH — ECONOMIC STABILITY: TRANSPORTATION INSECURITY
IN THE PAST 12 MONTHS, HAS THE LACK OF TRANSPORTATION KEPT YOU FROM MEDICAL APPOINTMENTS OR FROM GETTING MEDICATIONS?: NO

## 2022-01-18 ASSESSMENT — LIFESTYLE VARIABLES
HOW OFTEN DO YOU HAVE SIX OR MORE DRINKS ON ONE OCCASION: NEVER
HOW MANY STANDARD DRINKS CONTAINING ALCOHOL DO YOU HAVE ON A TYPICAL DAY: 1 OR 2
HOW OFTEN DO YOU HAVE A DRINK CONTAINING ALCOHOL: 2-3 TIMES A WEEK

## 2022-01-18 ASSESSMENT — SOCIAL DETERMINANTS OF HEALTH (SDOH)
HOW OFTEN DO YOU HAVE SIX OR MORE DRINKS ON ONE OCCASION: NEVER
WITHIN THE PAST 12 MONTHS, YOU WORRIED THAT YOUR FOOD WOULD RUN OUT BEFORE YOU GOT THE MONEY TO BUY MORE: NEVER TRUE
HOW OFTEN DO YOU GET TOGETHER WITH FRIENDS OR RELATIVES?: MORE THAN THREE TIMES A WEEK
HOW OFTEN DO YOU HAVE A DRINK CONTAINING ALCOHOL: 2-3 TIMES A WEEK
HOW OFTEN DO YOU ATTEND CHURCH OR RELIGIOUS SERVICES?: PATIENT DECLINED
HOW OFTEN DO YOU ATTENT MEETINGS OF THE CLUB OR ORGANIZATION YOU BELONG TO?: PATIENT DECLINED
HOW OFTEN DO YOU ATTEND CHURCH OR RELIGIOUS SERVICES?: PATIENT DECLINED
HOW OFTEN DO YOU GET TOGETHER WITH FRIENDS OR RELATIVES?: MORE THAN THREE TIMES A WEEK
IN A TYPICAL WEEK, HOW MANY TIMES DO YOU TALK ON THE PHONE WITH FAMILY, FRIENDS, OR NEIGHBORS?: MORE THAN THREE TIMES A WEEK
HOW OFTEN DO YOU ATTENT MEETINGS OF THE CLUB OR ORGANIZATION YOU BELONG TO?: PATIENT DECLINED
DO YOU BELONG TO ANY CLUBS OR ORGANIZATIONS SUCH AS CHURCH GROUPS UNIONS, FRATERNAL OR ATHLETIC GROUPS, OR SCHOOL GROUPS?: NO
HOW MANY DRINKS CONTAINING ALCOHOL DO YOU HAVE ON A TYPICAL DAY WHEN YOU ARE DRINKING: 1 OR 2
HOW HARD IS IT FOR YOU TO PAY FOR THE VERY BASICS LIKE FOOD, HOUSING, MEDICAL CARE, AND HEATING?: NOT VERY HARD
IN A TYPICAL WEEK, HOW MANY TIMES DO YOU TALK ON THE PHONE WITH FAMILY, FRIENDS, OR NEIGHBORS?: MORE THAN THREE TIMES A WEEK
DO YOU BELONG TO ANY CLUBS OR ORGANIZATIONS SUCH AS CHURCH GROUPS UNIONS, FRATERNAL OR ATHLETIC GROUPS, OR SCHOOL GROUPS?: NO

## 2022-01-20 ENCOUNTER — OFFICE VISIT (OUTPATIENT)
Dept: MEDICAL GROUP | Age: 72
End: 2022-01-20
Payer: MEDICARE

## 2022-01-20 ENCOUNTER — DOCUMENTATION (OUTPATIENT)
Dept: VASCULAR LAB | Facility: MEDICAL CENTER | Age: 72
End: 2022-01-20

## 2022-01-20 VITALS
OXYGEN SATURATION: 97 % | TEMPERATURE: 97.7 F | DIASTOLIC BLOOD PRESSURE: 88 MMHG | WEIGHT: 175 LBS | HEIGHT: 67 IN | BODY MASS INDEX: 27.47 KG/M2 | HEART RATE: 55 BPM | SYSTOLIC BLOOD PRESSURE: 124 MMHG

## 2022-01-20 DIAGNOSIS — I10 ESSENTIAL HYPERTENSION: ICD-10-CM

## 2022-01-20 DIAGNOSIS — Z00.00 MEDICARE ANNUAL WELLNESS VISIT, SUBSEQUENT: Primary | ICD-10-CM

## 2022-01-20 DIAGNOSIS — R73.01 IMPAIRED FASTING GLUCOSE: ICD-10-CM

## 2022-01-20 DIAGNOSIS — I71.019 DISSECTION OF THORACIC AORTA (HCC): ICD-10-CM

## 2022-01-20 DIAGNOSIS — Z12.12 SCREENING FOR COLORECTAL CANCER: ICD-10-CM

## 2022-01-20 DIAGNOSIS — Z12.11 SCREENING FOR COLORECTAL CANCER: ICD-10-CM

## 2022-01-20 DIAGNOSIS — Z85.46 PERSONAL HISTORY OF MALIGNANT NEOPLASM OF PROSTATE: ICD-10-CM

## 2022-01-20 DIAGNOSIS — Q25.1 COARCTATION OF AORTA: ICD-10-CM

## 2022-01-20 DIAGNOSIS — Q24.9 CONGENITAL MALFORMATION OF HEART: ICD-10-CM

## 2022-01-20 DIAGNOSIS — E78.2 MIXED HYPERLIPIDEMIA: ICD-10-CM

## 2022-01-20 DIAGNOSIS — G25.0 BENIGN ESSENTIAL TREMOR: ICD-10-CM

## 2022-01-20 DIAGNOSIS — F41.0 PANIC ATTACK: ICD-10-CM

## 2022-01-20 PROBLEM — F34.1 DYSTHYMIA: Status: RESOLVED | Noted: 2020-11-23 | Resolved: 2022-01-20

## 2022-01-20 PROCEDURE — G0439 PPPS, SUBSEQ VISIT: HCPCS | Performed by: FAMILY MEDICINE

## 2022-01-20 RX ORDER — ALPRAZOLAM 0.25 MG/1
0.25 TABLET ORAL NIGHTLY PRN
Qty: 30 TABLET | Refills: 0 | Status: SHIPPED | OUTPATIENT
Start: 2022-01-20 | End: 2022-02-19

## 2022-01-20 ASSESSMENT — ENCOUNTER SYMPTOMS: GENERAL WELL-BEING: GOOD

## 2022-01-20 ASSESSMENT — PATIENT HEALTH QUESTIONNAIRE - PHQ9: CLINICAL INTERPRETATION OF PHQ2 SCORE: 0

## 2022-01-20 ASSESSMENT — ACTIVITIES OF DAILY LIVING (ADL): BATHING_REQUIRES_ASSISTANCE: 0

## 2022-01-20 ASSESSMENT — FIBROSIS 4 INDEX: FIB4 SCORE: 1.26

## 2022-01-20 NOTE — PROGRESS NOTES
Chief Complaint   Patient presents with   • Medicare Annual Wellness       HPI:  Juan is a 71 y.o. here for Medicare Annual Wellness Visit    Patient is doing well.  He takes all medication as directed.  He tolerates them well, no side effects reported.    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 Bloch) 04/26/2016   • Mixed hyperlipidemia 02/01/2013   • Essential hypertension 02/01/2013       Current Outpatient Medications   Medication Sig Dispense Refill   • ALPRAZolam (XANAX) 0.25 MG Tab Take 1 Tablet by mouth at bedtime as needed for Anxiety for up to 30 days. 30 Tablet 0   • carvedilol (COREG) 3.125 MG Tab Take 1 tablet by mouth twice daily 180 Tablet 1   • atorvastatin (LIPITOR) 80 MG tablet TAKE 1 TABLET BY MOUTH ONCE DAILY IN THE EVENING 100 tablet 2   • lisinopril (PRINIVIL) 20 MG Tab Take 1 tablet by mouth every day. 100 tablet 2   • Cholecalciferol (VITAMIN D) 50 MCG (2000 UT) Cap      • Omega-3 Fatty Acids (FISH OIL) 1000 MG Cap capsule Take 1,000 mg by mouth every day.     • aspirin (ASA) 81 MG Chew Tab chewable tablet Take 1 Tab by mouth every day. 100 Tab 11   • therapeutic multivitamin-minerals (THERAGRAN-M) TABS Take 1 Tab by mouth every day.       No current facility-administered medications for this visit.        Patient is taking medications as noted in medication list.  Current supplements as per medication list.     Allergies: Zoloft    Current social contact/activities: pt socializes with friends     Is patient current with  immunizations? Yes.    He  reports that he quit smoking about 13 years ago. His smoking use included cigarettes. He has a 7.50 pack-year smoking history. He has never used smokeless tobacco. He reports current alcohol use. He reports current drug use. Drugs: Marijuana and Oral.  Counseling given: Not Answered      DPA/Advanced directive: Patient does not have an Advanced Directive.  A packet and workshop information was given on Advanced Directives.    ROS:    Gait: Uses no assistive device   Ostomy: No   Other tubes: No   Amputations: No   Chronic oxygen use No   Last eye exam about a year ago   Wears hearing aids: No   : Reports urinary leakage during the last 6 months that has not interfered at all with their daily activities or sleep.    Screening:  Depression Screening  Little interest or pleasure in doing things?  0 - not at all  Feeling down, depressed, or hopeless? 0 - not at all    Screening for Cognitive Impairment  Three Minute Recall (captain, char, picture)  3/3    Draw clock face with all 12 numbers and set the hands to show 5 past 8.  Yes    If cognitive concerns identified, deferred for follow up unless specifically addressed in assessment and plan.    Fall Risk Assessment  Has the patient had two or more falls in the last year or any fall with injury in the last year?  Yes  If fall risk identified, deferred for follow up unless specifically addressed in assessment and plan.    Safety Assessment  Throw rugs on floor.  Yes  Handrails on all stairs.  Yes  Good lighting in all hallways.  Yes  Difficulty hearing.  No  Patient counseled about all safety risks that were identified.    Functional Assessment ADLs  Are there any barriers preventing you from cooking for yourself or meeting nutritional needs?  No.    Are there any barriers preventing you from driving safely or obtaining transportation?  No.    Are there any barriers preventing you from using a telephone or calling for help?  No.    Are there  any barriers preventing you from shopping?  No.    Are there any barriers preventing you from taking care of your own finances?  No.    Are there any barriers preventing you from managing your medications?    No.    Are there any barriers preventing you from showering, bathing or dressing yourself?  No.    Are you currently engaging in any exercise or physical activity?  Yes.     What is your perception of your health?  Good.    Health Maintenance Summary          Ordered - COLORECTAL CANCER SCREENING (COLOGUARD STOOL DNA - Every 3 Years) Ordered on 1/20/2022 08/26/2018  COLOGUARD COLON CANCER SCREENING    08/26/2018  COLOGUARD COLON CANCER SCREENING    07/01/2016  OCCULT BLOOD FECES IMMUNOASSAY          Annual Wellness Visit (Every 366 Days) Next due on 1/21/2023 01/20/2022  Visit Dx: Medicare annual wellness visit, subsequent    01/20/2022  Level of Service: ANNUAL WELLNESS VISIT-INCLUDES PPPS SUBSEQUE*    01/22/2021  Subsequent Annual Wellness Visit - Includes PPPS ()    01/22/2021  Visit Dx: Medicare annual wellness visit, subsequent    09/17/2019  Visit Dx: Medicare annual wellness visit, subsequent    Only the first 5 history entries have been loaded, but more history exists.          IMM DTaP/Tdap/Td Vaccine (2 - Td or Tdap) Next due on 9/6/2025 09/06/2015  Imm Admin: Tdap Vaccine          HEPATITIS C SCREENING  Completed    07/01/2016  HEPATITIS PANEL ACUTE(4 COMPONENTS)          IMM PNEUMOCOCCAL VACCINE: 65+ Years (Series Information) Completed    07/27/2018  Imm Admin: Pneumococcal polysaccharide vaccine (PPSV-23)    07/12/2017  Imm Admin: Pneumococcal Conjugate Vaccine (Prevnar/PCV-13)          IMM ZOSTER VACCINES (Series Information) Completed    07/27/2021  Imm Admin: Zoster Vaccine Recombinant (RZV) (SHINGRIX)    10/19/2020  Imm Admin: Zoster Vaccine Recombinant (RZV) (SHINGRIX)    10/06/2014  Imm Admin: Zoster Vaccine Live (ZVL) (Zostavax) - HISTORICAL DATA          IMM INFLUENZA  (Series Information) Completed    2021  Imm Admin: Influenza Vaccine Quad Inj (Pf)    2020  Imm Admin: Influenza Vaccine Adult HD    2019  Imm Admin: Influenza Vaccine Adult HD    2018  Imm Admin: Influenza Vaccine Adult HD    10/26/2014  Imm Admin: Influenza (IM) Preservative Free - HISTORICAL DATA          COVID-19 Vaccine (Series Information) Completed    2021  Imm Admin: Pfizer SARS-CoV-2 Vaccine 12+    2021  Imm Admin: Pfizer SARS-CoV-2 Vaccine    2021  Imm Admin: Pfizer SARS-CoV-2 Vaccine          IMM HEP B VACCINE (Series Information) Aged Out    No completion history exists for this topic.          IMM MENINGOCOCCAL VACCINE (MCV4) (Series Information) Aged Out    No completion history exists for this topic.                Patient Care Team:  Ekaterina Love M.D. as PCP - General (Family Medicine)  Manda Heath M.D. as PCP - [facility] Paneled  Michoacano Pantoja M.D. as Consulting Physician (Cardiovascular Disease (Cardiology))  Bora Shook M.D. as Consulting Physician (Urology)  Michael J Bloch, M.D. as Consulting Physician (Cardiovascular Disease (Cardiology))  Mehdi Anderson M.D. (Radiation Oncology)  [facility] (Inactive) as Consulting Physician  FELIBERTO Menjivar as Attending Team Physician (Cardiovascular Disease (Cardiology))  Yan Tang M.D. as Attending Team Physician (Vascular Center)    Social History     Tobacco Use   • Smoking status: Former Smoker     Packs/day: 0.50     Years: 15.00     Pack years: 7.50     Types: Cigarettes     Quit date: 2008     Years since quittin.7   • Smokeless tobacco: Never Used   Vaping Use   • Vaping Use: Every day   • Substances: Nicotine   Substance Use Topics   • Alcohol use: Yes     Alcohol/week: 0.0 oz   • Drug use: Yes     Types: Marijuana, Oral     Comment: gummies     Family History   Problem Relation Age of Onset   • Lung Disease Mother         Severe, chronic bronchitis  "  • Hyperlipidemia Mother    • GI Disease Mother         colon polyps   • Other Mother         heart valve/migraine   • Psychiatric Illness Father         Depression   • Psychiatric Illness Brother         depression   • Stroke Maternal Grandmother 55   • GI Disease Brother         colon polyps   • Hyperlipidemia Brother    • No Known Problems Maternal Grandfather    • No Known Problems Paternal Grandmother    • No Known Problems Paternal Grandfather    • Cancer Brother         Prostate   • Stroke Maternal Aunt 55     He  has a past medical history of Anxiety, Aortic coarctation, Aortic valve stenosis, Chest pain (4/23/2016), Depression, Headache(784.0), Heart murmur, Hyperlipidemia, Hypertension, Migraine, Muscle, jerky movements (uncontrolled) (1974), Nodular hyperplasia of prostate gland (4/11/2011), NSTEMI (non-ST elevated myocardial infarction) (HCC) (4/25/2016), Prostate cancer (HCC), Prostate cancer (HCC), RLQ abdominal pain (7/1/2016), and Tachycardia (4/23/2016). He also has no past medical history of GERD (gastroesophageal reflux disease), Thyroid disease, Tremor, essential, Ulcer, or Urinary tract infection, site not specified.   Past Surgical History:   Procedure Laterality Date   • AORTIC VALVE REPLACEMENT  4/28/2016    Procedure: AORTIC VALVE REPLACEMENT WITH JENNIFER;  Surgeon: Guero Bradford M.D.;  Location: SURGERY [facility];  Service:    • HERNIA REPAIR      Umbilical   • OTHER      prostate surgery         Exam:   /88 (BP Location: Right arm, Patient Position: Sitting, BP Cuff Size: Adult)   Pulse (!) 55   Temp 36.5 °C (97.7 °F) (Temporal)   Ht 1.702 m (5' 7\")   Wt 79.4 kg (175 lb)   SpO2 97%  Body mass index is 27.41 kg/m².    Hearing excellent.    Dentition good  Alert, oriented in no acute distress.  Eye contact is good, speech goal directed, affect calm    Assessment and Plan. The following treatment and monitoring plan is recommended:      1. Medicare annual wellness visit, " subsequent  General health and wellness counseling provided.        2. [facility]  [facility]    3. Essential hypertension  Chronic, controlled with Coreg 3.125 mg twice daily, lisinopril 20 mg daily.  No side effect reported, will continue.    4. Mixed hyperlipidemia  Chronic, controlled with Lipitor 80 mg daily, no side effect reported, will continue.    5. Impaired fasting blood sugar  - Dietary/lifestyle modification and weight loss     6. Panic attack  Patient suffers intermittent panic attacks.  He also has mild anxiety disorder.  However, patient is not interested in pharmacotherapy.  For the most part, he is able to manage his symptoms well.  He does have intermittent panic attacks usually associated with increasing social stress associated with family members.  He takes Xanax 0.25 mg as needed for symptoms.  Negative history of proximal, alcohol, tobacco abuse.  In general, he required approximately 30 tablets every 6 months.  - ALPRAZolam (XANAX) 0.25 MG Tab; Take 1 Tablet by mouth at bedtime as needed for Anxiety for up to 30 days.  Dispense: 30 Tablet; Refill: 0    7. Coarctation of aorta, CTA and ECHO every 2 years, f/u vascular medicine (Dr. Bloch)  Chronic, has been working with vascular medicine.  Patient has been doing echocardiogram and CTA every 2 years.  He is due to have repeat CTA this year.  Patient no longer wishes to follow-up with vascular medicine.  He will continue to follow-up with me at this point.  Blood pressure is controlled with oral medications.  - CT-CTA CHEST WITH & W/O-POST PROCESS; Future      8. Benign essential tremor  Chronic, mild, does not require treatment, will continue to monitor.    9. Personal history of malignant ne[facility] of [facility]   Diagnosed in 2011, s/p prostatectomy in 2011. Pt suffered elevated PSA in 2017, status post salvage radiation + Lupron,  F/u consistently with Dr. Anderson and Dr. Shook.  - PSA TOTAL +  %FREE; Future    Services suggested: No services needed at this time  Health Care Screening recommendations as per orders if indicated.  Referrals offered: PT/OT/Nutrition counseling/Behavioral Health/Smoking cessation as per orders if indicated.    Discussion today about general wellness and lifestyle habits:    · Prevent falls and reduce trip hazards; Cautioned about securing or removing rugs.  · Have a working fire alarm and carbon monoxide detector;   · Engage in regular physical activity and social activities.     Follow-up: Return in about 6 months (around 7/20/2022) for Multiple issues.

## 2022-01-21 ENCOUNTER — HOSPITAL ENCOUNTER (OUTPATIENT)
Dept: LAB | Facility: MEDICAL CENTER | Age: 72
End: 2022-01-21
Attending: NURSE PRACTITIONER
Payer: MEDICARE

## 2022-01-21 ENCOUNTER — HOSPITAL ENCOUNTER (OUTPATIENT)
Dept: LAB | Facility: MEDICAL CENTER | Age: 72
End: 2022-01-21
Attending: FAMILY MEDICINE
Payer: MEDICARE

## 2022-01-21 DIAGNOSIS — R73.01 IMPAIRED FASTING GLUCOSE: ICD-10-CM

## 2022-01-21 DIAGNOSIS — I10 ESSENTIAL HYPERTENSION: ICD-10-CM

## 2022-01-21 DIAGNOSIS — Z85.46 PERSONAL HISTORY OF MALIGNANT NEOPLASM OF PROSTATE: ICD-10-CM

## 2022-01-21 DIAGNOSIS — E03.8 SUBCLINICAL HYPOTHYROIDISM: ICD-10-CM

## 2022-01-21 LAB
ALBUMIN SERPL BCP-MCNC: 4.1 G/DL (ref 3.2–4.9)
ALBUMIN/GLOB SERPL: 1.6 G/DL
ALP SERPL-CCNC: 86 U/L (ref 30–99)
ALT SERPL-CCNC: 44 U/L (ref 2–50)
ANION GAP SERPL CALC-SCNC: 11 MMOL/L (ref 7–16)
ANION GAP SERPL CALC-SCNC: 12 MMOL/L (ref 7–16)
AST SERPL-CCNC: 29 U/L (ref 12–45)
BASOPHILS # BLD AUTO: 1 % (ref 0–1.8)
BASOPHILS # BLD: 0.06 K/UL (ref 0–0.12)
BILIRUB SERPL-MCNC: 0.4 MG/DL (ref 0.1–1.5)
BUN SERPL-MCNC: 14 MG/DL (ref 8–22)
BUN SERPL-MCNC: 14 MG/DL (ref 8–22)
CALCIUM SERPL-MCNC: 9.2 MG/DL (ref 8.5–10.5)
CALCIUM SERPL-MCNC: 9.2 MG/DL (ref 8.5–10.5)
CHLORIDE SERPL-SCNC: 108 MMOL/L (ref 96–112)
CHLORIDE SERPL-SCNC: 108 MMOL/L (ref 96–112)
CO2 SERPL-SCNC: 23 MMOL/L (ref 20–33)
CO2 SERPL-SCNC: 24 MMOL/L (ref 20–33)
CREAT SERPL-MCNC: 1.09 MG/DL (ref 0.5–1.4)
CREAT SERPL-MCNC: 1.09 MG/DL (ref 0.5–1.4)
CREAT UR-MCNC: 156.85 MG/DL
EOSINOPHIL # BLD AUTO: 0.23 K/UL (ref 0–0.51)
EOSINOPHIL NFR BLD: 3.9 % (ref 0–6.9)
ERYTHROCYTE [DISTWIDTH] IN BLOOD BY AUTOMATED COUNT: 44 FL (ref 35.9–50)
EST. AVERAGE GLUCOSE BLD GHB EST-MCNC: 123 MG/DL
GLOBULIN SER CALC-MCNC: 2.5 G/DL (ref 1.9–3.5)
GLUCOSE SERPL-MCNC: 101 MG/DL (ref 65–99)
GLUCOSE SERPL-MCNC: 101 MG/DL (ref 65–99)
HBA1C MFR BLD: 5.9 % (ref 4–5.6)
HCT VFR BLD AUTO: 49.4 % (ref 42–52)
HGB BLD-MCNC: 16 G/DL (ref 14–18)
IMM GRANULOCYTES # BLD AUTO: 0.01 K/UL (ref 0–0.11)
IMM GRANULOCYTES NFR BLD AUTO: 0.2 % (ref 0–0.9)
LYMPHOCYTES # BLD AUTO: 1.86 K/UL (ref 1–4.8)
LYMPHOCYTES NFR BLD: 31.4 % (ref 22–41)
MCH RBC QN AUTO: 30.8 PG (ref 27–33)
MCHC RBC AUTO-ENTMCNC: 32.4 G/DL (ref 33.7–35.3)
MCV RBC AUTO: 95 FL (ref 81.4–97.8)
MICROALBUMIN UR-MCNC: <1.2 MG/DL
MICROALBUMIN/CREAT UR: NORMAL MG/G (ref 0–30)
MONOCYTES # BLD AUTO: 0.5 K/UL (ref 0–0.85)
MONOCYTES NFR BLD AUTO: 8.4 % (ref 0–13.4)
NEUTROPHILS # BLD AUTO: 3.26 K/UL (ref 1.82–7.42)
NEUTROPHILS NFR BLD: 55.1 % (ref 44–72)
NRBC # BLD AUTO: 0 K/UL
NRBC BLD-RTO: 0 /100 WBC
PLATELET # BLD AUTO: 296 K/UL (ref 164–446)
PMV BLD AUTO: 9.5 FL (ref 9–12.9)
POTASSIUM SERPL-SCNC: 4.5 MMOL/L (ref 3.6–5.5)
POTASSIUM SERPL-SCNC: 4.6 MMOL/L (ref 3.6–5.5)
PROT SERPL-MCNC: 6.6 G/DL (ref 6–8.2)
RBC # BLD AUTO: 5.2 M/UL (ref 4.7–6.1)
SODIUM SERPL-SCNC: 143 MMOL/L (ref 135–145)
SODIUM SERPL-SCNC: 143 MMOL/L (ref 135–145)
TSH SERPL DL<=0.005 MIU/L-ACNC: 5.16 UIU/ML (ref 0.38–5.33)
WBC # BLD AUTO: 5.9 K/UL (ref 4.8–10.8)

## 2022-01-21 PROCEDURE — 82043 UR ALBUMIN QUANTITATIVE: CPT

## 2022-01-21 PROCEDURE — 84443 ASSAY THYROID STIM HORMONE: CPT

## 2022-01-21 PROCEDURE — 36415 COLL VENOUS BLD VENIPUNCTURE: CPT

## 2022-01-21 PROCEDURE — 83036 HEMOGLOBIN GLYCOSYLATED A1C: CPT

## 2022-01-21 PROCEDURE — 82570 ASSAY OF URINE CREATININE: CPT

## 2022-01-21 PROCEDURE — 80053 COMPREHEN METABOLIC PANEL: CPT

## 2022-01-21 PROCEDURE — 84154 ASSAY OF PSA FREE: CPT

## 2022-01-21 PROCEDURE — 84153 ASSAY OF PSA TOTAL: CPT

## 2022-01-21 PROCEDURE — 80048 BASIC METABOLIC PNL TOTAL CA: CPT

## 2022-01-21 PROCEDURE — 85025 COMPLETE CBC W/AUTO DIFF WBC: CPT

## 2022-01-24 ENCOUNTER — PATIENT MESSAGE (OUTPATIENT)
Dept: HEALTH INFORMATION MANAGEMENT | Facility: OTHER | Age: 72
End: 2022-01-24
Payer: MEDICARE

## 2022-01-24 LAB
PSA FREE MFR SERPL: NORMAL %
PSA FREE SERPL-MCNC: <0.1 NG/ML
PSA SERPL-MCNC: <0.1 NG/ML (ref 0–4)

## 2022-01-27 DIAGNOSIS — R73.01 IMPAIRED FASTING GLUCOSE: ICD-10-CM

## 2022-01-27 DIAGNOSIS — E78.2 MIXED HYPERLIPIDEMIA: ICD-10-CM

## 2022-02-04 ENCOUNTER — HOSPITAL ENCOUNTER (OUTPATIENT)
Dept: RADIOLOGY | Facility: MEDICAL CENTER | Age: 72
End: 2022-02-04
Attending: FAMILY MEDICINE
Payer: MEDICARE

## 2022-02-04 DIAGNOSIS — Q24.9 CONGENITAL MALFORMATION OF HEART: ICD-10-CM

## 2022-02-04 PROCEDURE — 71275 CT ANGIOGRAPHY CHEST: CPT | Mod: ME

## 2022-02-04 PROCEDURE — 700117 HCHG RX CONTRAST REV CODE 255: Performed by: FAMILY MEDICINE

## 2022-02-04 RX ADMIN — IOHEXOL 100 ML: 350 INJECTION, SOLUTION INTRAVENOUS at 11:43

## 2022-02-08 ENCOUNTER — DOCUMENTATION (OUTPATIENT)
Dept: VASCULAR LAB | Facility: MEDICAL CENTER | Age: 72
End: 2022-02-08

## 2022-02-09 NOTE — PROGRESS NOTES
CTA was stable coarctation and ectasia  We will discuss patient follow-up visit  Anticipate repeat MRA chest in 2 years  APN to update surveillance calendar    Michael Bloch, MD  Vascular Medicine

## 2022-02-11 ENCOUNTER — DOCUMENTATION (OUTPATIENT)
Dept: VASCULAR LAB | Facility: MEDICAL CENTER | Age: 72
End: 2022-02-11

## 2022-02-11 NOTE — PROGRESS NOTES
Spoke with to let him know CT scan looks stable.  We will keep an eye on things and go over the details at his follow-up visit. Patient states understanding and is in agreement with plan.

## 2022-03-28 DIAGNOSIS — I10 ESSENTIAL HYPERTENSION: ICD-10-CM

## 2022-03-31 RX ORDER — LISINOPRIL 20 MG/1
TABLET ORAL
Qty: 100 TABLET | Refills: 2 | Status: SHIPPED | OUTPATIENT
Start: 2022-03-31 | End: 2023-01-19 | Stop reason: SDUPTHER

## 2022-04-11 DIAGNOSIS — E78.2 MIXED HYPERLIPIDEMIA: ICD-10-CM

## 2022-04-11 RX ORDER — ATORVASTATIN CALCIUM 80 MG/1
TABLET, FILM COATED ORAL
Qty: 100 TABLET | Refills: 3 | Status: SHIPPED | OUTPATIENT
Start: 2022-04-11 | End: 2023-01-19 | Stop reason: SDUPTHER

## 2022-04-15 DIAGNOSIS — I10 ESSENTIAL HYPERTENSION: ICD-10-CM

## 2022-04-15 RX ORDER — CARVEDILOL 3.12 MG/1
3.12 TABLET ORAL 2 TIMES DAILY
Qty: 200 TABLET | Refills: 3 | Status: SHIPPED | OUTPATIENT
Start: 2022-04-15 | End: 2023-01-19 | Stop reason: SDUPTHER

## 2022-05-03 ENCOUNTER — TELEPHONE (OUTPATIENT)
Dept: HEALTH INFORMATION MANAGEMENT | Facility: OTHER | Age: 72
End: 2022-05-03
Payer: MEDICARE

## 2022-05-09 PROBLEM — I71.20 THORACIC AORTIC ANEURYSM WITHOUT RUPTURE (HCC): Status: ACTIVE | Noted: 2022-05-09

## 2022-05-09 PROBLEM — F13.20 SEDATIVE, HYPNOTIC, OR ANXIOLYTIC DEPENDENCE (HCC): Status: ACTIVE | Noted: 2022-05-09

## 2022-07-11 ENCOUNTER — HOSPITAL ENCOUNTER (OUTPATIENT)
Dept: LAB | Facility: MEDICAL CENTER | Age: 72
End: 2022-07-11
Attending: FAMILY MEDICINE
Payer: MEDICARE

## 2022-07-11 DIAGNOSIS — R73.01 IMPAIRED FASTING GLUCOSE: ICD-10-CM

## 2022-07-11 DIAGNOSIS — E78.2 MIXED HYPERLIPIDEMIA: ICD-10-CM

## 2022-07-11 LAB
ALBUMIN SERPL BCP-MCNC: 3.9 G/DL (ref 3.2–4.9)
ALBUMIN/GLOB SERPL: 1.8 G/DL
ALP SERPL-CCNC: 80 U/L (ref 30–99)
ALT SERPL-CCNC: 32 U/L (ref 2–50)
ANION GAP SERPL CALC-SCNC: 8 MMOL/L (ref 7–16)
AST SERPL-CCNC: 31 U/L (ref 12–45)
BASOPHILS # BLD AUTO: 0.8 % (ref 0–1.8)
BASOPHILS # BLD: 0.05 K/UL (ref 0–0.12)
BILIRUB SERPL-MCNC: 0.5 MG/DL (ref 0.1–1.5)
BUN SERPL-MCNC: 13 MG/DL (ref 8–22)
CALCIUM SERPL-MCNC: 8.7 MG/DL (ref 8.5–10.5)
CHLORIDE SERPL-SCNC: 105 MMOL/L (ref 96–112)
CHOLEST SERPL-MCNC: 141 MG/DL (ref 100–199)
CO2 SERPL-SCNC: 25 MMOL/L (ref 20–33)
CREAT SERPL-MCNC: 1.08 MG/DL (ref 0.5–1.4)
EOSINOPHIL # BLD AUTO: 0.09 K/UL (ref 0–0.51)
EOSINOPHIL NFR BLD: 1.4 % (ref 0–6.9)
ERYTHROCYTE [DISTWIDTH] IN BLOOD BY AUTOMATED COUNT: 41.7 FL (ref 35.9–50)
EST. AVERAGE GLUCOSE BLD GHB EST-MCNC: 126 MG/DL
FASTING STATUS PATIENT QL REPORTED: NORMAL
GFR SERPLBLD CREATININE-BSD FMLA CKD-EPI: 73 ML/MIN/1.73 M 2
GLOBULIN SER CALC-MCNC: 2.2 G/DL (ref 1.9–3.5)
GLUCOSE SERPL-MCNC: 104 MG/DL (ref 65–99)
HBA1C MFR BLD: 6 % (ref 4–5.6)
HCT VFR BLD AUTO: 46.7 % (ref 42–52)
HDLC SERPL-MCNC: 33 MG/DL
HGB BLD-MCNC: 15.8 G/DL (ref 14–18)
IMM GRANULOCYTES # BLD AUTO: 0.02 K/UL (ref 0–0.11)
IMM GRANULOCYTES NFR BLD AUTO: 0.3 % (ref 0–0.9)
LDLC SERPL CALC-MCNC: 90 MG/DL
LYMPHOCYTES # BLD AUTO: 1.73 K/UL (ref 1–4.8)
LYMPHOCYTES NFR BLD: 27.9 % (ref 22–41)
MCH RBC QN AUTO: 31.2 PG (ref 27–33)
MCHC RBC AUTO-ENTMCNC: 33.8 G/DL (ref 33.7–35.3)
MCV RBC AUTO: 92.3 FL (ref 81.4–97.8)
MONOCYTES # BLD AUTO: 0.42 K/UL (ref 0–0.85)
MONOCYTES NFR BLD AUTO: 6.8 % (ref 0–13.4)
NEUTROPHILS # BLD AUTO: 3.9 K/UL (ref 1.82–7.42)
NEUTROPHILS NFR BLD: 62.8 % (ref 44–72)
NRBC # BLD AUTO: 0 K/UL
NRBC BLD-RTO: 0 /100 WBC
PLATELET # BLD AUTO: 259 K/UL (ref 164–446)
PMV BLD AUTO: 9.2 FL (ref 9–12.9)
POTASSIUM SERPL-SCNC: 4.2 MMOL/L (ref 3.6–5.5)
PROT SERPL-MCNC: 6.1 G/DL (ref 6–8.2)
RBC # BLD AUTO: 5.06 M/UL (ref 4.7–6.1)
SODIUM SERPL-SCNC: 138 MMOL/L (ref 135–145)
TRIGL SERPL-MCNC: 92 MG/DL (ref 0–149)
WBC # BLD AUTO: 6.2 K/UL (ref 4.8–10.8)

## 2022-07-11 PROCEDURE — 80053 COMPREHEN METABOLIC PANEL: CPT

## 2022-07-11 PROCEDURE — 83036 HEMOGLOBIN GLYCOSYLATED A1C: CPT

## 2022-07-11 PROCEDURE — 85025 COMPLETE CBC W/AUTO DIFF WBC: CPT

## 2022-07-11 PROCEDURE — 36415 COLL VENOUS BLD VENIPUNCTURE: CPT

## 2022-07-11 PROCEDURE — 80061 LIPID PANEL: CPT

## 2022-07-18 ENCOUNTER — OFFICE VISIT (OUTPATIENT)
Dept: MEDICAL GROUP | Age: 72
End: 2022-07-18
Payer: MEDICARE

## 2022-07-18 VITALS
HEART RATE: 68 BPM | WEIGHT: 175 LBS | OXYGEN SATURATION: 96 % | HEIGHT: 66 IN | BODY MASS INDEX: 28.12 KG/M2 | DIASTOLIC BLOOD PRESSURE: 68 MMHG | TEMPERATURE: 97.3 F | SYSTOLIC BLOOD PRESSURE: 120 MMHG

## 2022-07-18 DIAGNOSIS — B35.1 ONYCHOMYCOSIS: ICD-10-CM

## 2022-07-18 DIAGNOSIS — E78.5 DYSLIPIDEMIA: ICD-10-CM

## 2022-07-18 DIAGNOSIS — I71.20 THORACIC AORTIC ANEURYSM WITHOUT RUPTURE (HCC): ICD-10-CM

## 2022-07-18 DIAGNOSIS — R73.03 PRE-DIABETES: ICD-10-CM

## 2022-07-18 DIAGNOSIS — I10 ESSENTIAL HYPERTENSION: ICD-10-CM

## 2022-07-18 DIAGNOSIS — Z91.81 HISTORY OF FALL: ICD-10-CM

## 2022-07-18 DIAGNOSIS — Z85.46 PERSONAL HISTORY OF MALIGNANT NEOPLASM OF PROSTATE: ICD-10-CM

## 2022-07-18 DIAGNOSIS — Q25.1 COARCTATION OF AORTA: ICD-10-CM

## 2022-07-18 DIAGNOSIS — F41.0 PANIC ATTACK: ICD-10-CM

## 2022-07-18 PROBLEM — F13.20 SEDATIVE, HYPNOTIC, OR ANXIOLYTIC DEPENDENCE (HCC): Status: RESOLVED | Noted: 2022-05-09 | Resolved: 2022-07-18

## 2022-07-18 PROCEDURE — 99214 OFFICE O/P EST MOD 30 MIN: CPT | Performed by: FAMILY MEDICINE

## 2022-07-18 RX ORDER — METFORMIN HYDROCHLORIDE 500 MG/1
500 TABLET, EXTENDED RELEASE ORAL DAILY
Qty: 90 TABLET | Refills: 3 | Status: SHIPPED | OUTPATIENT
Start: 2022-07-18 | End: 2022-07-21 | Stop reason: SDUPTHER

## 2022-07-18 RX ORDER — ALPRAZOLAM 0.25 MG/1
0.25 TABLET ORAL NIGHTLY PRN
Qty: 30 TABLET | Refills: 0 | Status: SHIPPED | OUTPATIENT
Start: 2022-07-18 | End: 2023-01-19 | Stop reason: SDUPTHER

## 2022-07-18 RX ORDER — TERBINAFINE HYDROCHLORIDE 250 MG/1
250 TABLET ORAL DAILY
Qty: 90 TABLET | Refills: 0 | Status: SHIPPED
Start: 2022-07-18 | End: 2023-01-19

## 2022-07-18 ASSESSMENT — FIBROSIS 4 INDEX: FIB4 SCORE: 1.5

## 2022-07-19 NOTE — PROGRESS NOTES
Subjective:   CC: hypertension follow up     HPI:     Juan Berumen is a 72 y.o. male, established patient of the clinic.     Patient has chronic essential hypertension, hyperlipidemia, prediabetes, intermittent panic attack.  Patient is taking all medication as directed.  He tolerates them well, no side effect reported.  He is taking Xanax 0.25 mg as needed for intermittent panic attacks.  In general, he needs about 30 tablets every 6 months.    Patient was previously diagnosed with thoracic aortic aneurysm and coarctation of the aorta.  He is status post bioprosthetic aortic valve replacement in 2016.  He continues to follow-up with vascular medicine regularly.  His last CTA was in February 2022 which were notable for stable appearance of the thoracic aorta with coarctation and ectasia.  Patient remains asymptomatic.    Patient was previously diagnosed with prostate cancer in in 2011, status post prostatectomy in 2011 and salvage radiation start Lupron in 2017.  He continues to follow-up annually with Dr. Shook.  No active urogenital symptoms reported.    Patient fell twice from tripping over the past 6 months.  This usually happens when he walks his dog.  No visual problem, gait imbalance, instability, lower extremity weakness, neuropathy symptoms reported.    Patient also complains of persistent onychomycosis affecting multiple toes bilaterally.  He is interested in treatment.  He is asymptomatic.      Current medicines (including changes today)  Current Outpatient Medications   Medication Sig Dispense Refill   • metFORMIN ER (GLUCOPHAGE XR) 500 MG TABLET SR 24 HR Take 1 Tablet by mouth every day. 90 Tablet 3   • ALPRAZolam (XANAX) 0.25 MG Tab Take 1 Tablet by mouth at bedtime as needed (anxiety) for up to 30 days. 30 Tablet 0   • terbinafine (LAMISIL) 250 MG Tab Take 1 Tablet by mouth every day. 90 Tablet 0   • carvedilol (COREG) 3.125 MG Tab Take 1 Tablet by mouth 2 times a day. 200 Tablet 3   •  "atorvastatin (LIPITOR) 80 MG tablet TAKE 1 TABLET BY MOUTH ONCE DAILY IN THE EVENING 100 Tablet 3   • lisinopril (PRINIVIL) 20 MG Tab Take 1 tablet by mouth once daily 100 Tablet 2   • Cholecalciferol (VITAMIN D) 50 MCG (2000 UT) Cap      • Omega-3 Fatty Acids (FISH OIL) 1000 MG Cap capsule Take 1,000 mg by mouth every day.     • aspirin (ASA) 81 MG Chew Tab chewable tablet Take 1 Tab by mouth every day. 100 Tab 11   • therapeutic multivitamin-minerals (THERAGRAN-M) TABS Take 1 Tab by mouth every day.       No current facility-administered medications for this visit.     He  has a past medical history of Anxiety, Aortic coarctation, Aortic valve stenosis, Chest pain (4/23/2016), Depression, Headache(784.0), Heart murmur, Hyperlipidemia, Hypertension, Migraine, Muscle, jerky movements (uncontrolled) (1974), Nodular hyperplasia of prostate gland (4/11/2011), NSTEMI (non-ST elevated myocardial infarction) (HCC) (4/25/2016), Prostate cancer ([facility]), Prostate cancer ([facility]), RLQ abdominal pain (7/1/2016), and Tachycardia (4/23/2016).    He has no past medical history of GERD (gastroesophageal reflux disease), Thyroid disease, Tremor, essential, Ulcer, or Urinary tract infection, site not specified.    I reviewed patient's problem list, allergies, medications, family hx, social hx with patient and update EPIC.        Objective:     /68 (BP Location: Right arm, Patient Position: Sitting, BP Cuff Size: Adult)   Pulse 68   Temp 36.3 °C (97.3 °F) (Temporal)   Ht 1.676 m (5' 6\")   Wt 79.4 kg (175 lb)   SpO2 96%  Body mass index is 28.25 kg/m².    Physical Exam:  Constitutional: awake, alert, in no distress.  Skin: Warm, dry, good turgor, no rashes, bruises, ulcers in visible areas.  Eye: conjunctiva clear, lids neg for edema or lesions.  ENMT: TM and auditory canals wnl.    Neck: Trachea midline, no masses, no thyromegaly. No cervical or supraclavicular lymphadenopathy  Respiratory: Unlabored respiratory effort, lungs " clear to auscultation, no wheezes, no rales.  Cardiovascular: Normal S1, S2, no murmur, no pedal edema.  Abdomen: Soft, non-tender to palpation, active BS, no hernia, no hepatosplenomegaly, negative rebound or guarding.   Psych: Oriented x3, affect and mood wnl, intact judgement and insight.   Foot exam: Onychomycosis noted in multiple toenails bilaterally    Assessment and Plan:   The following treatment plan was discussed    1. Essential hypertension  Chronic, controlled with carvedilol 3.125 mg twice daily and lisinopril 10 mg daily, no s/e reported, will continue.    - Comp Metabolic Panel; Future  - CBC WITH DIFFERENTIAL; Future  - MICROALBUMIN CREAT RATIO URINE; Future    2. Dyslipidemia  Chronic, controlled with atorvastatin 80 mg daily, no s/e reported, will continue.    - Comp Metabolic Panel; Future  - CBC WITH DIFFERENTIAL; Future  - Lipid Profile; Future  - dietary modification, exercise, and weight loss.   - avoid alcohol, drugs, tobacco products     3. Pre-diabetes  Recent A1c was 6.0.   - metFORMIN ER (GLUCOPHAGE XR) 500 MG TABLET SR 24 HR; Take 1 Tablet by mouth every day.  Dispense: 90 Tablet; Refill: 3  - HEMOGLOBIN A1C; Future  - Comp Metabolic Panel; Future  - CBC WITH DIFFERENTIAL; Future    4. Thoracic aortic aneurysm without rupture (HCC)  5. Coarctation of aorta, CTA and ECHO every 2 years, f/u vascular medicine (Dr. Bloch)  Chronic, stable, asymptomatic.  Follow-up with vascular medicine as directed.    6. Panic attack  Intermittent panic attacks, takes Xanax 0.25 mg as needed.  Patient tolerates medication well, no side effect reported.  - ALPRAZolam (XANAX) 0.25 MG Tab; Take 1 Tablet by mouth at bedtime as needed (anxiety) for up to 30 days.  Dispense: 30 Tablet; Refill: 0  - Risks, benefits, side effects, as well as potential health complications associated with Xanax  was previously discussed with patient. Appropriate counseling provided.      7. BMI 28.0-28.9,adult  - Patient identified  as having weight management issue.  Appropriate orders and counseling given.     8. Personal history of malignant ne[facility]   Patient is in remission.  No active urogenital symptoms reported  Follow-up with urology as directed.  - PSA TOTAL + %FREE; Future    9. Onychomycosis  - terbinafine (LAMISIL) 250 MG Tab; Take 1 Tablet by mouth every day.  Dispense: 90 Tablet; Refill: 0    10. History of fall  History of falls x2 without sustained injury.  Appropriate counseling provided.      Ekaterina Love M.D.      Followup: Return in about 6 months (around 1/18/2023) for Multiple issues.    Please note that this dictation was created using voice recognition software. I have made every reasonable attempt to correct obvious errors, but I expect that there are errors of grammar and possibly content that I did not discover before finalizing the note.

## 2022-07-21 DIAGNOSIS — R73.03 PRE-DIABETES: ICD-10-CM

## 2022-07-21 RX ORDER — METFORMIN HYDROCHLORIDE 500 MG/1
500 TABLET, EXTENDED RELEASE ORAL DAILY
Qty: 100 TABLET | Refills: 3 | Status: SHIPPED
Start: 2022-07-21 | End: 2022-10-14

## 2022-07-21 NOTE — TELEPHONE ENCOUNTER
Received request via: Pharmacy    Was the patient seen in the last year in this department? Yes    Does the patient have an active prescription (recently filled or refills available) for medication(s) requested? Pharmacy requests 100 day supply per insurance

## 2022-08-12 ENCOUNTER — HOSPITAL ENCOUNTER (OUTPATIENT)
Dept: LAB | Facility: MEDICAL CENTER | Age: 72
End: 2022-08-12
Attending: FAMILY MEDICINE
Payer: MEDICARE

## 2022-08-12 DIAGNOSIS — Z85.46 PERSONAL HISTORY OF MALIGNANT NEOPLASM OF PROSTATE: ICD-10-CM

## 2022-08-12 PROCEDURE — 36415 COLL VENOUS BLD VENIPUNCTURE: CPT

## 2022-08-12 PROCEDURE — 84154 ASSAY OF PSA FREE: CPT

## 2022-08-12 PROCEDURE — 84153 ASSAY OF PSA TOTAL: CPT

## 2022-08-15 LAB
PSA FREE MFR SERPL: NORMAL %
PSA FREE SERPL-MCNC: <0.1 NG/ML
PSA SERPL-MCNC: <0.1 NG/ML (ref 0–4)

## 2023-01-17 ENCOUNTER — HOSPITAL ENCOUNTER (OUTPATIENT)
Dept: LAB | Facility: MEDICAL CENTER | Age: 73
End: 2023-01-17
Attending: FAMILY MEDICINE
Payer: MEDICARE

## 2023-01-17 DIAGNOSIS — E78.5 DYSLIPIDEMIA: ICD-10-CM

## 2023-01-17 DIAGNOSIS — R73.03 PRE-DIABETES: ICD-10-CM

## 2023-01-17 DIAGNOSIS — I10 ESSENTIAL HYPERTENSION: ICD-10-CM

## 2023-01-17 LAB
ALBUMIN SERPL BCP-MCNC: 4 G/DL (ref 3.2–4.9)
ALBUMIN/GLOB SERPL: 1.7 G/DL
ALP SERPL-CCNC: 103 U/L (ref 30–99)
ALT SERPL-CCNC: 42 U/L (ref 2–50)
ANION GAP SERPL CALC-SCNC: 7 MMOL/L (ref 7–16)
AST SERPL-CCNC: 30 U/L (ref 12–45)
BASOPHILS # BLD AUTO: 0.7 % (ref 0–1.8)
BASOPHILS # BLD: 0.04 K/UL (ref 0–0.12)
BILIRUB SERPL-MCNC: 0.4 MG/DL (ref 0.1–1.5)
BUN SERPL-MCNC: 13 MG/DL (ref 8–22)
CALCIUM ALBUM COR SERPL-MCNC: 8.9 MG/DL (ref 8.5–10.5)
CALCIUM SERPL-MCNC: 8.9 MG/DL (ref 8.5–10.5)
CHLORIDE SERPL-SCNC: 109 MMOL/L (ref 96–112)
CHOLEST SERPL-MCNC: 155 MG/DL (ref 100–199)
CO2 SERPL-SCNC: 26 MMOL/L (ref 20–33)
CREAT SERPL-MCNC: 1.02 MG/DL (ref 0.5–1.4)
CREAT UR-MCNC: 88.16 MG/DL
EOSINOPHIL # BLD AUTO: 0.09 K/UL (ref 0–0.51)
EOSINOPHIL NFR BLD: 1.6 % (ref 0–6.9)
ERYTHROCYTE [DISTWIDTH] IN BLOOD BY AUTOMATED COUNT: 43.5 FL (ref 35.9–50)
EST. AVERAGE GLUCOSE BLD GHB EST-MCNC: 123 MG/DL
FASTING STATUS PATIENT QL REPORTED: NORMAL
GFR SERPLBLD CREATININE-BSD FMLA CKD-EPI: 78 ML/MIN/1.73 M 2
GLOBULIN SER CALC-MCNC: 2.3 G/DL (ref 1.9–3.5)
GLUCOSE SERPL-MCNC: 98 MG/DL (ref 65–99)
HBA1C MFR BLD: 5.9 % (ref 4–5.6)
HCT VFR BLD AUTO: 47 % (ref 42–52)
HDLC SERPL-MCNC: 34 MG/DL
HGB BLD-MCNC: 15.5 G/DL (ref 14–18)
IMM GRANULOCYTES # BLD AUTO: 0.02 K/UL (ref 0–0.11)
IMM GRANULOCYTES NFR BLD AUTO: 0.4 % (ref 0–0.9)
LDLC SERPL CALC-MCNC: 108 MG/DL
LYMPHOCYTES # BLD AUTO: 2.11 K/UL (ref 1–4.8)
LYMPHOCYTES NFR BLD: 38.5 % (ref 22–41)
MCH RBC QN AUTO: 30.5 PG (ref 27–33)
MCHC RBC AUTO-ENTMCNC: 33 G/DL (ref 33.7–35.3)
MCV RBC AUTO: 92.3 FL (ref 81.4–97.8)
MICROALBUMIN UR-MCNC: <1.2 MG/DL
MICROALBUMIN/CREAT UR: NORMAL MG/G (ref 0–30)
MONOCYTES # BLD AUTO: 0.52 K/UL (ref 0–0.85)
MONOCYTES NFR BLD AUTO: 9.5 % (ref 0–13.4)
NEUTROPHILS # BLD AUTO: 2.7 K/UL (ref 1.82–7.42)
NEUTROPHILS NFR BLD: 49.3 % (ref 44–72)
NRBC # BLD AUTO: 0 K/UL
NRBC BLD-RTO: 0 /100 WBC
PLATELET # BLD AUTO: 322 K/UL (ref 164–446)
PMV BLD AUTO: 9.2 FL (ref 9–12.9)
POTASSIUM SERPL-SCNC: 4.2 MMOL/L (ref 3.6–5.5)
PROT SERPL-MCNC: 6.3 G/DL (ref 6–8.2)
RBC # BLD AUTO: 5.09 M/UL (ref 4.7–6.1)
SODIUM SERPL-SCNC: 142 MMOL/L (ref 135–145)
TRIGL SERPL-MCNC: 63 MG/DL (ref 0–149)
WBC # BLD AUTO: 5.5 K/UL (ref 4.8–10.8)

## 2023-01-17 PROCEDURE — 82570 ASSAY OF URINE CREATININE: CPT

## 2023-01-17 PROCEDURE — 85025 COMPLETE CBC W/AUTO DIFF WBC: CPT

## 2023-01-17 PROCEDURE — 80053 COMPREHEN METABOLIC PANEL: CPT

## 2023-01-17 PROCEDURE — 36415 COLL VENOUS BLD VENIPUNCTURE: CPT

## 2023-01-17 PROCEDURE — 80061 LIPID PANEL: CPT

## 2023-01-17 PROCEDURE — 82043 UR ALBUMIN QUANTITATIVE: CPT

## 2023-01-17 PROCEDURE — 83036 HEMOGLOBIN GLYCOSYLATED A1C: CPT

## 2023-01-19 ENCOUNTER — OFFICE VISIT (OUTPATIENT)
Dept: MEDICAL GROUP | Age: 73
End: 2023-01-19
Payer: MEDICARE

## 2023-01-19 ENCOUNTER — PATIENT MESSAGE (OUTPATIENT)
Dept: MEDICAL GROUP | Age: 73
End: 2023-01-19

## 2023-01-19 VITALS
DIASTOLIC BLOOD PRESSURE: 64 MMHG | HEIGHT: 66 IN | WEIGHT: 170 LBS | SYSTOLIC BLOOD PRESSURE: 116 MMHG | OXYGEN SATURATION: 98 % | BODY MASS INDEX: 27.32 KG/M2 | TEMPERATURE: 96.9 F | HEART RATE: 58 BPM

## 2023-01-19 DIAGNOSIS — I10 ESSENTIAL HYPERTENSION: ICD-10-CM

## 2023-01-19 DIAGNOSIS — Z85.46 PERSONAL HISTORY OF MALIGNANT NEOPLASM OF PROSTATE: ICD-10-CM

## 2023-01-19 DIAGNOSIS — E78.00 PURE HYPERCHOLESTEROLEMIA: ICD-10-CM

## 2023-01-19 DIAGNOSIS — R73.03 PRE-DIABETES: ICD-10-CM

## 2023-01-19 DIAGNOSIS — F41.0 PANIC ATTACK: ICD-10-CM

## 2023-01-19 DIAGNOSIS — I71.23 ANEURYSM OF DESCENDING THORACIC AORTA WITHOUT RUPTURE (HCC): ICD-10-CM

## 2023-01-19 PROCEDURE — 99214 OFFICE O/P EST MOD 30 MIN: CPT | Performed by: FAMILY MEDICINE

## 2023-01-19 RX ORDER — LISINOPRIL 20 MG/1
20 TABLET ORAL DAILY
Qty: 100 TABLET | Refills: 2 | Status: SHIPPED | OUTPATIENT
Start: 2023-01-19 | End: 2023-12-11

## 2023-01-19 RX ORDER — ATORVASTATIN CALCIUM 80 MG/1
80 TABLET, FILM COATED ORAL EVERY EVENING
Qty: 100 TABLET | Refills: 3 | Status: SHIPPED | OUTPATIENT
Start: 2023-01-19

## 2023-01-19 RX ORDER — SILDENAFIL CITRATE 20 MG/1
TABLET ORAL
COMMUNITY
End: 2024-01-22

## 2023-01-19 RX ORDER — CARVEDILOL 3.12 MG/1
3.12 TABLET ORAL 2 TIMES DAILY
Qty: 200 TABLET | Refills: 3 | Status: SHIPPED | OUTPATIENT
Start: 2023-01-19

## 2023-01-19 ASSESSMENT — PATIENT HEALTH QUESTIONNAIRE - PHQ9: CLINICAL INTERPRETATION OF PHQ2 SCORE: 0

## 2023-01-19 ASSESSMENT — FIBROSIS 4 INDEX: FIB4 SCORE: 1.02

## 2023-01-19 NOTE — PROGRESS NOTES
Subjective:   CC: Hypertension follow-up    HPI:     Juan Berumen is a 72 y.o. male, established patient of the clinic.     Patient has chronic essential hypertension, hyperlipidemia, prediabetes, mild aneurysm of descending thoracic aorta, and history of prostate cancer currently in remission.  Patient is taking all medication as directed.  He tolerates them well, no side effect reported.     Current medicines (including changes today)  Current Outpatient Medications   Medication Sig Dispense Refill    sildenafil (REVATIO) 20 MG tablet Sildenafil tablets 20 mg   take 2-5 tab orally by mouth 45 minute prior to sexual encounter on empty stomach      atorvastatin (LIPITOR) 80 MG tablet Take 1 Tablet by mouth every evening. 100 Tablet 3    carvedilol (COREG) 3.125 MG Tab Take 1 Tablet by mouth 2 times a day. 200 Tablet 3    lisinopril (PRINIVIL) 20 MG Tab Take 1 Tablet by mouth every day. 100 Tablet 2    Cholecalciferol (VITAMIN D) 50 MCG (2000 UT) Cap       Omega-3 Fatty Acids (FISH OIL) 1000 MG Cap capsule Take 1,000 mg by mouth every day.      aspirin (ASA) 81 MG Chew Tab chewable tablet Take 1 Tab by mouth every day. 100 Tab 11    therapeutic multivitamin-minerals (THERAGRAN-M) TABS Take 1 Tab by mouth every day.       No current facility-administered medications for this visit.     He  has a past medical history of Anxiety, Aortic coarctation, Aortic valve stenosis, Chest pain (4/23/2016), Depression, Headache(784.0), Heart murmur, Hyperlipidemia, Hypertension, Migraine, Muscle, jerky movements (uncontrolled) (1974), Nodular hyperplasia of prostate gland (4/11/2011), NSTEMI (non-ST elevated myocardial infarction) (HCC) (4/25/2016), Prostate cancer (HCC), Prostate cancer (HCC), RLQ abdominal pain (7/1/2016), and Tachycardia (4/23/2016).    He has no past medical history of GERD (gastroesophageal reflux disease), Thyroid disease, Tremor, essential, Ulcer, or Urinary tract infection, site not specified.    I  "reviewed patient's problem list, allergies, medications, family hx, social hx with patient and update EPIC.        Objective:     /64 (BP Location: Right arm, Patient Position: Sitting, BP Cuff Size: Small adult)   Pulse (!) 58   Temp 36.1 °C (96.9 °F) (Temporal)   Ht 1.676 m (5' 6\")   Wt 77.1 kg (170 lb)   SpO2 98%  Body mass index is 27.44 kg/m².    Physical Exam:  Constitutional: awake, alert, in no distress.  Skin: Warm, dry, good turgor, no rashes, bruises, ulcers in visible areas.  Eye: conjunctiva clear, lids neg for edema or lesions.  Neck: Trachea midline, no masses, no thyromegaly. No cervical or supraclavicular lymphadenopathy  Respiratory: Unlabored respiratory effort, lungs clear to auscultation, no wheezes, no rales.  Cardiovascular: Normal S1, S2, no murmur, no pedal edema.  Psych: Oriented x3, affect and mood wnl, intact judgement and insight.       Assessment and Plan:   The following treatment plan was discussed    1. Essential hypertension  Chronic, controlled with Coreg 3.125 mg twice daily and lisinopril 20 mg daily, no s/e reported, will continue.    - carvedilol (COREG) 3.125 MG Tab; Take 1 Tablet by mouth 2 times a day.  Dispense: 200 Tablet; Refill: 3  - lisinopril (PRINIVIL) 20 MG Tab; Take 1 Tablet by mouth every day.  Dispense: 100 Tablet; Refill: 2  - CBC WITH DIFFERENTIAL; Future  - Comp Metabolic Panel; Future    2. Pure hypercholesterolemia  Chronic, controlled with Lipitor 80 mg daily, no s/e reported, will continue.    - atorvastatin (LIPITOR) 80 MG tablet; Take 1 Tablet by mouth every evening.  Dispense: 100 Tablet; Refill: 3  - CBC WITH DIFFERENTIAL; Future  - Comp Metabolic Panel; Future  - Lipid Profile; Future    3. Pre-diabetes  Recent A1c was 5.9, slight improvement from previous labs.  - Dietary/lifestyle modification and weight loss    - HEMOGLOBIN A1C; Future    4. Aneurysm of descending thoracic aorta without rupture  CTA done in 2/2022 showed aneurysm of " ascending thoracic aorta measures 3.7 cm.  Patient is asymptomatic.  He wishes to have repeat CTA in 2024.  -Continue antihypertensive meds as below.    5. Personal history of malignant neoplasm of prostate[facility]   History of prostate cancer, currently in remission, has regular follow-up with urology once a year.  No active urinary symptoms reported.  - PSA TOTAL + %FREE; Future       Ly ABNER Love M.D.      Followup: Return in about 6 months (around 7/19/2023) for Annual wellness visit.    Please note that this dictation was created using voice recognition software. I have made every reasonable attempt to correct obvious errors, but I expect that there are errors of grammar and possibly content that I did not discover before finalizing the note.

## 2023-01-20 RX ORDER — ALPRAZOLAM 0.25 MG/1
0.25 TABLET ORAL NIGHTLY PRN
Qty: 30 TABLET | Refills: 0 | Status: SHIPPED | OUTPATIENT
Start: 2023-01-20 | End: 2023-07-20 | Stop reason: SDUPTHER

## 2023-01-20 NOTE — PATIENT COMMUNICATION
Received request via: Patient    Was the patient seen in the last year in this department? Yes    Does the patient have an active prescription (recently filled or refills available) for medication(s) requested? No    Does the patient have half-way Plus and need 100 day supply (blood pressure, diabetes and cholesterol meds only)? Medication is not for cholesterol, blood pressure or diabetes

## 2023-03-10 DIAGNOSIS — Q25.1 COARCTATION OF AORTA: ICD-10-CM

## 2023-03-10 DIAGNOSIS — Z95.2 S/P AVR (AORTIC VALVE REPLACEMENT): ICD-10-CM

## 2023-03-10 DIAGNOSIS — I71.20 THORACIC AORTIC ANEURYSM WITHOUT RUPTURE, UNSPECIFIED PART (HCC): ICD-10-CM

## 2023-03-11 NOTE — PROGRESS NOTES
Order placed for vascular surveillance imaging.  Ante Up message sent to pt to remind that they are due for their surveillance vascular imaging.  Scheduling numbers provided.    Betzaida CUNNINGHAM  [facility]

## 2023-06-28 ENCOUNTER — DOCUMENTATION (OUTPATIENT)
Dept: HEALTH INFORMATION MANAGEMENT | Facility: OTHER | Age: 73
End: 2023-06-28
Payer: MEDICARE

## 2023-07-14 ENCOUNTER — DOCUMENTATION (OUTPATIENT)
Dept: VASCULAR LAB | Facility: MEDICAL CENTER | Age: 73
End: 2023-07-14

## 2023-07-14 ENCOUNTER — PATIENT MESSAGE (OUTPATIENT)
Dept: MEDICAL GROUP | Age: 73
End: 2023-07-14

## 2023-07-14 ENCOUNTER — HOSPITAL ENCOUNTER (OUTPATIENT)
Dept: CARDIOLOGY | Facility: MEDICAL CENTER | Age: 73
End: 2023-07-14
Payer: MEDICARE

## 2023-07-14 DIAGNOSIS — Z95.2 S/P AVR (AORTIC VALVE REPLACEMENT): ICD-10-CM

## 2023-07-14 DIAGNOSIS — I71.23 ANEURYSM OF DESCENDING THORACIC AORTA WITHOUT RUPTURE (HCC): ICD-10-CM

## 2023-07-14 DIAGNOSIS — I71.20 THORACIC AORTIC ANEURYSM WITHOUT RUPTURE, UNSPECIFIED PART (HCC): ICD-10-CM

## 2023-07-14 DIAGNOSIS — Q25.1 COARCTATION OF AORTA: ICD-10-CM

## 2023-07-14 LAB
LV EJECT FRACT  99904: 55
LV EJECT FRACT MOD 2C 99903: 42
LV EJECT FRACT MOD 4C 99902: 66.82
LV EJECT FRACT MOD BP 99901: 55.98

## 2023-07-14 PROCEDURE — 93306 TTE W/DOPPLER COMPLETE: CPT | Mod: 26 | Performed by: INTERNAL MEDICINE

## 2023-07-14 PROCEDURE — 93306 TTE W/DOPPLER COMPLETE: CPT

## 2023-07-14 NOTE — PATIENT COMMUNICATION
Spoke to patient by phone on 7/14/2023. Recommended following up with vascular medicine.   Ekaterina Love M.D.

## 2023-07-17 ENCOUNTER — DOCUMENTATION (OUTPATIENT)
Dept: VASCULAR LAB | Facility: MEDICAL CENTER | Age: 73
End: 2023-07-17
Payer: MEDICARE

## 2023-07-17 NOTE — PROGRESS NOTES
Spoke with patient and wife to let them know Dr. Bloch reviewed the echo and let them know the valve is functioning well. Also let them know pt is overdue for f/u. Scheduled f/u with [facility] for 8/23/23.

## 2023-07-18 ENCOUNTER — HOSPITAL ENCOUNTER (OUTPATIENT)
Dept: LAB | Facility: MEDICAL CENTER | Age: 73
End: 2023-07-18
Attending: FAMILY MEDICINE
Payer: MEDICARE

## 2023-07-18 DIAGNOSIS — R73.03 PRE-DIABETES: ICD-10-CM

## 2023-07-18 DIAGNOSIS — Z85.46 PERSONAL HISTORY OF MALIGNANT NEOPLASM OF PROSTATE: ICD-10-CM

## 2023-07-18 DIAGNOSIS — E78.00 PURE HYPERCHOLESTEROLEMIA: ICD-10-CM

## 2023-07-18 DIAGNOSIS — I10 ESSENTIAL HYPERTENSION: ICD-10-CM

## 2023-07-18 LAB
ALBUMIN SERPL BCP-MCNC: 4 G/DL (ref 3.2–4.9)
ALBUMIN/GLOB SERPL: 1.8 G/DL
ALP SERPL-CCNC: 75 U/L (ref 30–99)
ALT SERPL-CCNC: 24 U/L (ref 2–50)
ANION GAP SERPL CALC-SCNC: 8 MMOL/L (ref 7–16)
AST SERPL-CCNC: 21 U/L (ref 12–45)
BASOPHILS # BLD AUTO: 0.9 % (ref 0–1.8)
BASOPHILS # BLD: 0.05 K/UL (ref 0–0.12)
BILIRUB SERPL-MCNC: 0.4 MG/DL (ref 0.1–1.5)
BUN SERPL-MCNC: 15 MG/DL (ref 8–22)
CALCIUM ALBUM COR SERPL-MCNC: 8.9 MG/DL (ref 8.5–10.5)
CALCIUM SERPL-MCNC: 8.9 MG/DL (ref 8.5–10.5)
CHLORIDE SERPL-SCNC: 108 MMOL/L (ref 96–112)
CHOLEST SERPL-MCNC: 149 MG/DL (ref 100–199)
CO2 SERPL-SCNC: 25 MMOL/L (ref 20–33)
CREAT SERPL-MCNC: 1.24 MG/DL (ref 0.5–1.4)
EOSINOPHIL # BLD AUTO: 0.13 K/UL (ref 0–0.51)
EOSINOPHIL NFR BLD: 2.3 % (ref 0–6.9)
ERYTHROCYTE [DISTWIDTH] IN BLOOD BY AUTOMATED COUNT: 45.1 FL (ref 35.9–50)
EST. AVERAGE GLUCOSE BLD GHB EST-MCNC: 120 MG/DL
FASTING STATUS PATIENT QL REPORTED: NORMAL
GFR SERPLBLD CREATININE-BSD FMLA CKD-EPI: 62 ML/MIN/1.73 M 2
GLOBULIN SER CALC-MCNC: 2.2 G/DL (ref 1.9–3.5)
GLUCOSE SERPL-MCNC: 103 MG/DL (ref 65–99)
HBA1C MFR BLD: 5.8 % (ref 4–5.6)
HCT VFR BLD AUTO: 48.8 % (ref 42–52)
HDLC SERPL-MCNC: 34 MG/DL
HGB BLD-MCNC: 15.8 G/DL (ref 14–18)
IMM GRANULOCYTES # BLD AUTO: 0.01 K/UL (ref 0–0.11)
IMM GRANULOCYTES NFR BLD AUTO: 0.2 % (ref 0–0.9)
LDLC SERPL CALC-MCNC: 99 MG/DL
LYMPHOCYTES # BLD AUTO: 1.81 K/UL (ref 1–4.8)
LYMPHOCYTES NFR BLD: 32.4 % (ref 22–41)
MCH RBC QN AUTO: 30.3 PG (ref 27–33)
MCHC RBC AUTO-ENTMCNC: 32.4 G/DL (ref 32.3–36.5)
MCV RBC AUTO: 93.7 FL (ref 81.4–97.8)
MONOCYTES # BLD AUTO: 0.51 K/UL (ref 0–0.85)
MONOCYTES NFR BLD AUTO: 9.1 % (ref 0–13.4)
NEUTROPHILS # BLD AUTO: 3.08 K/UL (ref 1.82–7.42)
NEUTROPHILS NFR BLD: 55.1 % (ref 44–72)
NRBC # BLD AUTO: 0 K/UL
NRBC BLD-RTO: 0 /100 WBC (ref 0–0.2)
PLATELET # BLD AUTO: 262 K/UL (ref 164–446)
PMV BLD AUTO: 9.5 FL (ref 9–12.9)
POTASSIUM SERPL-SCNC: 4.5 MMOL/L (ref 3.6–5.5)
PROT SERPL-MCNC: 6.2 G/DL (ref 6–8.2)
RBC # BLD AUTO: 5.21 M/UL (ref 4.7–6.1)
SODIUM SERPL-SCNC: 141 MMOL/L (ref 135–145)
TRIGL SERPL-MCNC: 81 MG/DL (ref 0–149)
WBC # BLD AUTO: 5.6 K/UL (ref 4.8–10.8)

## 2023-07-18 PROCEDURE — 80053 COMPREHEN METABOLIC PANEL: CPT

## 2023-07-18 PROCEDURE — 83036 HEMOGLOBIN GLYCOSYLATED A1C: CPT

## 2023-07-18 PROCEDURE — 85025 COMPLETE CBC W/AUTO DIFF WBC: CPT

## 2023-07-18 PROCEDURE — 36415 COLL VENOUS BLD VENIPUNCTURE: CPT

## 2023-07-18 PROCEDURE — 84154 ASSAY OF PSA FREE: CPT

## 2023-07-18 PROCEDURE — 80061 LIPID PANEL: CPT

## 2023-07-18 PROCEDURE — 84153 ASSAY OF PSA TOTAL: CPT

## 2023-07-18 SDOH — ECONOMIC STABILITY: INCOME INSECURITY: HOW HARD IS IT FOR YOU TO PAY FOR THE VERY BASICS LIKE FOOD, HOUSING, MEDICAL CARE, AND HEATING?: NOT HARD AT ALL

## 2023-07-18 SDOH — ECONOMIC STABILITY: FOOD INSECURITY: WITHIN THE PAST 12 MONTHS, THE FOOD YOU BOUGHT JUST DIDN'T LAST AND YOU DIDN'T HAVE MONEY TO GET MORE.: NEVER TRUE

## 2023-07-18 SDOH — HEALTH STABILITY: PHYSICAL HEALTH: ON AVERAGE, HOW MANY DAYS PER WEEK DO YOU ENGAGE IN MODERATE TO STRENUOUS EXERCISE (LIKE A BRISK WALK)?: 7 DAYS

## 2023-07-18 SDOH — HEALTH STABILITY: PHYSICAL HEALTH: ON AVERAGE, HOW MANY MINUTES DO YOU ENGAGE IN EXERCISE AT THIS LEVEL?: 40 MIN

## 2023-07-18 SDOH — ECONOMIC STABILITY: FOOD INSECURITY: WITHIN THE PAST 12 MONTHS, YOU WORRIED THAT YOUR FOOD WOULD RUN OUT BEFORE YOU GOT MONEY TO BUY MORE.: NEVER TRUE

## 2023-07-18 SDOH — ECONOMIC STABILITY: INCOME INSECURITY: IN THE LAST 12 MONTHS, WAS THERE A TIME WHEN YOU WERE NOT ABLE TO PAY THE MORTGAGE OR RENT ON TIME?: NO

## 2023-07-18 SDOH — ECONOMIC STABILITY: HOUSING INSECURITY: IN THE LAST 12 MONTHS, HOW MANY PLACES HAVE YOU LIVED?: 1

## 2023-07-18 SDOH — HEALTH STABILITY: MENTAL HEALTH
STRESS IS WHEN SOMEONE FEELS TENSE, NERVOUS, ANXIOUS, OR CAN'T SLEEP AT NIGHT BECAUSE THEIR MIND IS TROUBLED. HOW STRESSED ARE YOU?: ONLY A LITTLE

## 2023-07-18 ASSESSMENT — SOCIAL DETERMINANTS OF HEALTH (SDOH)
HOW OFTEN DO YOU HAVE SIX OR MORE DRINKS ON ONE OCCASION: NEVER
HOW OFTEN DO YOU ATTEND CHURCH OR RELIGIOUS SERVICES?: NEVER
DO YOU BELONG TO ANY CLUBS OR ORGANIZATIONS SUCH AS CHURCH GROUPS UNIONS, FRATERNAL OR ATHLETIC GROUPS, OR SCHOOL GROUPS?: NO
HOW OFTEN DO YOU ATTENT MEETINGS OF THE CLUB OR ORGANIZATION YOU BELONG TO?: NEVER
HOW OFTEN DO YOU HAVE A DRINK CONTAINING ALCOHOL: 2-4 TIMES A MONTH
HOW HARD IS IT FOR YOU TO PAY FOR THE VERY BASICS LIKE FOOD, HOUSING, MEDICAL CARE, AND HEATING?: NOT HARD AT ALL
DO YOU BELONG TO ANY CLUBS OR ORGANIZATIONS SUCH AS CHURCH GROUPS UNIONS, FRATERNAL OR ATHLETIC GROUPS, OR SCHOOL GROUPS?: NO
HOW MANY DRINKS CONTAINING ALCOHOL DO YOU HAVE ON A TYPICAL DAY WHEN YOU ARE DRINKING: 1 OR 2
HOW OFTEN DO YOU GET TOGETHER WITH FRIENDS OR RELATIVES?: MORE THAN THREE TIMES A WEEK
IN A TYPICAL WEEK, HOW MANY TIMES DO YOU TALK ON THE PHONE WITH FAMILY, FRIENDS, OR NEIGHBORS?: MORE THAN THREE TIMES A WEEK
HOW OFTEN DO YOU GET TOGETHER WITH FRIENDS OR RELATIVES?: MORE THAN THREE TIMES A WEEK
HOW OFTEN DO YOU ATTENT MEETINGS OF THE CLUB OR ORGANIZATION YOU BELONG TO?: NEVER
IN A TYPICAL WEEK, HOW MANY TIMES DO YOU TALK ON THE PHONE WITH FAMILY, FRIENDS, OR NEIGHBORS?: MORE THAN THREE TIMES A WEEK
HOW OFTEN DO YOU ATTEND CHURCH OR RELIGIOUS SERVICES?: NEVER
WITHIN THE PAST 12 MONTHS, YOU WORRIED THAT YOUR FOOD WOULD RUN OUT BEFORE YOU GOT THE MONEY TO BUY MORE: NEVER TRUE

## 2023-07-18 ASSESSMENT — LIFESTYLE VARIABLES
HOW MANY STANDARD DRINKS CONTAINING ALCOHOL DO YOU HAVE ON A TYPICAL DAY: 1 OR 2
AUDIT-C TOTAL SCORE: 2
HOW OFTEN DO YOU HAVE SIX OR MORE DRINKS ON ONE OCCASION: NEVER
SKIP TO QUESTIONS 9-10: 1
HOW OFTEN DO YOU HAVE A DRINK CONTAINING ALCOHOL: 2-4 TIMES A MONTH

## 2023-07-19 LAB
PSA FREE MFR SERPL: NORMAL %
PSA FREE SERPL-MCNC: <0.1 NG/ML
PSA SERPL-MCNC: <0.1 NG/ML (ref 0–4)

## 2023-07-20 ENCOUNTER — OFFICE VISIT (OUTPATIENT)
Dept: MEDICAL GROUP | Facility: MEDICAL CENTER | Age: 73
End: 2023-07-20
Payer: MEDICARE

## 2023-07-20 VITALS
DIASTOLIC BLOOD PRESSURE: 70 MMHG | HEART RATE: 68 BPM | HEIGHT: 66 IN | OXYGEN SATURATION: 97 % | TEMPERATURE: 99 F | SYSTOLIC BLOOD PRESSURE: 130 MMHG | WEIGHT: 168.87 LBS | BODY MASS INDEX: 27.14 KG/M2

## 2023-07-20 DIAGNOSIS — Z85.46 PERSONAL HISTORY OF MALIGNANT NEOPLASM OF PROSTATE: ICD-10-CM

## 2023-07-20 DIAGNOSIS — I10 ESSENTIAL HYPERTENSION: ICD-10-CM

## 2023-07-20 DIAGNOSIS — E78.00 PURE HYPERCHOLESTEROLEMIA: ICD-10-CM

## 2023-07-20 DIAGNOSIS — Z95.2 S/P AVR (AORTIC VALVE REPLACEMENT): ICD-10-CM

## 2023-07-20 DIAGNOSIS — Z00.00 MEDICARE ANNUAL WELLNESS VISIT, SUBSEQUENT: Primary | ICD-10-CM

## 2023-07-20 DIAGNOSIS — G25.0 BENIGN ESSENTIAL TREMOR: ICD-10-CM

## 2023-07-20 DIAGNOSIS — I71.23 ANEURYSM OF DESCENDING THORACIC AORTA WITHOUT RUPTURE (HCC): ICD-10-CM

## 2023-07-20 DIAGNOSIS — Q25.1 COARCTATION OF AORTA: ICD-10-CM

## 2023-07-20 DIAGNOSIS — H53.9 VISUAL CHANGES: ICD-10-CM

## 2023-07-20 DIAGNOSIS — H02.403 PTOSIS OF BOTH EYELIDS: ICD-10-CM

## 2023-07-20 DIAGNOSIS — F41.0 PANIC ATTACK: ICD-10-CM

## 2023-07-20 DIAGNOSIS — R73.03 PRE-DIABETES: ICD-10-CM

## 2023-07-20 PROCEDURE — 3075F SYST BP GE 130 - 139MM HG: CPT | Performed by: FAMILY MEDICINE

## 2023-07-20 PROCEDURE — 3078F DIAST BP <80 MM HG: CPT | Performed by: FAMILY MEDICINE

## 2023-07-20 PROCEDURE — 99214 OFFICE O/P EST MOD 30 MIN: CPT | Mod: 25 | Performed by: FAMILY MEDICINE

## 2023-07-20 PROCEDURE — G0439 PPPS, SUBSEQ VISIT: HCPCS | Performed by: FAMILY MEDICINE

## 2023-07-20 RX ORDER — VITAMIN E 268 MG
180 CAPSULE ORAL DAILY
COMMUNITY

## 2023-07-20 RX ORDER — ALPRAZOLAM 0.25 MG/1
0.25 TABLET ORAL NIGHTLY PRN
Qty: 30 TABLET | Refills: 0 | Status: SHIPPED | OUTPATIENT
Start: 2023-07-20 | End: 2023-08-19

## 2023-07-20 ASSESSMENT — FIBROSIS 4 INDEX: FIB4 SCORE: 1.18

## 2023-07-20 ASSESSMENT — PATIENT HEALTH QUESTIONNAIRE - PHQ9: CLINICAL INTERPRETATION OF PHQ2 SCORE: 0

## 2023-07-20 ASSESSMENT — ENCOUNTER SYMPTOMS: GENERAL WELL-BEING: FAIR

## 2023-07-20 ASSESSMENT — ACTIVITIES OF DAILY LIVING (ADL): BATHING_REQUIRES_ASSISTANCE: 0
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
## 2023-08-23 ENCOUNTER — OFFICE VISIT (OUTPATIENT)
Dept: VASCULAR LAB | Facility: MEDICAL CENTER | Age: 73
End: 2023-08-23
Payer: MEDICARE

## 2023-08-23 VITALS
SYSTOLIC BLOOD PRESSURE: 114 MMHG | HEART RATE: 62 BPM | DIASTOLIC BLOOD PRESSURE: 70 MMHG | WEIGHT: 168 LBS | BODY MASS INDEX: 27.12 KG/M2

## 2023-08-23 DIAGNOSIS — Z95.2 S/P AVR (AORTIC VALVE REPLACEMENT): ICD-10-CM

## 2023-08-23 DIAGNOSIS — E78.00 PURE HYPERCHOLESTEROLEMIA: ICD-10-CM

## 2023-08-23 DIAGNOSIS — I10 ESSENTIAL HYPERTENSION: ICD-10-CM

## 2023-08-23 DIAGNOSIS — I71.23 ANEURYSM OF DESCENDING THORACIC AORTA WITHOUT RUPTURE (HCC): ICD-10-CM

## 2023-08-23 DIAGNOSIS — R73.03 PRE-DIABETES: ICD-10-CM

## 2023-08-23 PROCEDURE — 3074F SYST BP LT 130 MM HG: CPT

## 2023-08-23 PROCEDURE — 99214 OFFICE O/P EST MOD 30 MIN: CPT

## 2023-08-23 PROCEDURE — 3078F DIAST BP <80 MM HG: CPT

## 2023-08-23 PROCEDURE — 99212 OFFICE O/P EST SF 10 MIN: CPT

## 2023-08-23 RX ORDER — EZETIMIBE 10 MG/1
10 TABLET ORAL DAILY
Qty: 100 TABLET | Refills: 3 | Status: SHIPPED | OUTPATIENT
Start: 2023-08-23 | End: 2024-01-22

## 2023-08-23 ASSESSMENT — ENCOUNTER SYMPTOMS
BRUISES/BLEEDS EASILY: 1
PALPITATIONS: 0
DOUBLE VISION: 0
WEAKNESS: 0
DIZZINESS: 0
FOCAL WEAKNESS: 0
SPEECH CHANGE: 0
SHORTNESS OF BREATH: 0
HEADACHES: 0
BLOOD IN STOOL: 0
BLURRED VISION: 0
MYALGIAS: 0
CLAUDICATION: 0

## 2023-08-23 ASSESSMENT — FIBROSIS 4 INDEX: FIB4 SCORE: 1.18

## 2023-08-23 NOTE — PROGRESS NOTES
Follow Up VASCULAR VISIT  Subjective:   Juan Berumen is a 73 y.o. male who presents today 08/23/2023 for   Chief Complaint   Patient presents with    Follow-Up   Here for f/u of coarctation, valvular heart disease, hypertension, and dyslipidemia    Last seen 7/16/2021    HPI:  Last seen 7/16/2021  Has been following up with PCP  Had imaging done   Had labs done - reviewed  Has started vaping daily, does have nicotine in cartridges    Coarctation, s/p AVR:   Echo completed.  No symptoms.  Feeling well.   Has questions on reading  Has not seen cardiology since 2020     HTN:  BP at home usually  110-120/60-70s   Tolerating all meds   Denies fatigue     HLD:  On atorvastatin 80mg-- no ADRs  Did note increase myalgias, improved after starting coq10      On ASA-- no bleeding issues      Current Outpatient Medications:     ezetimibe, 10 mg, Oral, DAILY    coenzyme Q-10, 60 mg, Oral, DAILY, Taking    vitamin E, 180 mg, Oral, DAILY, Taking    sildenafil, Sildenafil tablets 20 mg  take 2-5 tab orally by mouth 45 minute prior to sexual encounter on empty stomach, Taking    atorvastatin, 80 mg, Oral, Q EVENING, Taking    carvedilol, 3.125 mg, Oral, BID, Taking    lisinopril, 20 mg, Oral, DAILY, Taking    fish oil, 1,000 mg, Oral, DAILY, Taking    aspirin, 81 mg, Oral, DAILY, Taking    therapeutic multivitamin-minerals, 1 Tablet, Oral, DAILY, Taking     Social History     Tobacco Use   Smoking Status Former    Current packs/day: 0.00    Average packs/day: 0.5 packs/day for 15.0 years (7.5 ttl pk-yrs)    Types: Cigarettes    Start date: 4/11/1993    Quit date: 4/11/2008    Years since quitting: 15.3   Smokeless Tobacco Never     DIET AND EXERCISE:  Weight Change: up about 10 pounds  BMI Readings from Last 5 Encounters:   08/23/23 27.12 kg/m²   07/20/23 27.26 kg/m²   01/19/23 27.44 kg/m²   07/18/22 28.25 kg/m²   05/09/22 28.08 kg/m²      Diet: common adult  Exercise: moderate regular exercise program walking 3 miles daily  With  dog    Review of Systems   Constitutional:  Negative for malaise/fatigue.   HENT:  Negative for nosebleeds.    Eyes:  Negative for blurred vision and double vision.   Respiratory:  Negative for shortness of breath.    Cardiovascular:  Negative for chest pain, palpitations, claudication and leg swelling.   Gastrointestinal:  Negative for blood in stool and melena.   Genitourinary:  Negative for hematuria.   Musculoskeletal:  Negative for joint pain and myalgias.   Neurological:  Negative for dizziness, speech change, focal weakness, weakness and headaches.   Endo/Heme/Allergies:  Bruises/bleeds easily.      Objective:     Vitals:    08/23/23 1541   BP: 114/70   BP Location: Left arm   Patient Position: Sitting   BP Cuff Size: Adult   Pulse: 62   Weight: 76.2 kg (168 lb)      BP Readings from Last 5 Encounters:   08/23/23 114/70   07/20/23 130/70   01/19/23 116/64   07/18/22 120/68   05/09/22 120/60      Body mass index is 27.12 kg/m².  Physical Exam  Vitals reviewed.   Constitutional:       General: He is not in acute distress.     Appearance: He is well-developed. He is not diaphoretic.   HENT:      Head: Normocephalic and atraumatic.   Eyes:      General: No scleral icterus.     Conjunctiva/sclera: Conjunctivae normal.   Cardiovascular:      Rate and Rhythm: Normal rate and regular rhythm.      Pulses:           Posterior tibial pulses are 2+ on the right side and 2+ on the left side.      Heart sounds: Murmur heard.   Pulmonary:      Effort: Pulmonary effort is normal. No respiratory distress.      Breath sounds: Normal breath sounds. No wheezing or rales.   Musculoskeletal:      Right lower leg: No edema.      Left lower leg: No edema.   Skin:     General: Skin is warm and dry.      Coloration: Skin is not pale.   Neurological:      General: No focal deficit present.      Mental Status: He is alert and oriented to person, place, and time.      Coordination: Coordination normal.      Gait: Gait normal.    Psychiatric:         Behavior: Behavior normal.       Lab Results   Component Value Date    CHOLSTRLTOT 149 07/18/2023    LDL 99 07/18/2023    HDL 34 (A) 07/18/2023    TRIGLYCERIDE 81 07/18/2023      Lab Results   Component Value Date    SODIUM 141 07/18/2023    POTASSIUM 4.5 07/18/2023    CHLORIDE 108 07/18/2023    CO2 25 07/18/2023    GLUCOSE 103 (H) 07/18/2023    BUN 15 07/18/2023    CREATININE 1.24 07/18/2023    IFAFRICA >60 01/21/2022    IFNOTAFR >60 01/21/2022      Lab Results   Component Value Date    WBC 5.6 07/18/2023    RBC 5.21 07/18/2023    HEMOGLOBIN 15.8 07/18/2023    HEMATOCRIT 48.8 07/18/2023    MCV 93.7 07/18/2023    MCH 30.3 07/18/2023    MCHC 32.4 07/18/2023    MPV 9.5 07/18/2023      CTA chest 3/2020   Coarctations aorta similar to previous findings.  Interval sternotomy and placement aortic valvular prosthesis. The ascending aorta measures 3.1 x 3.4 cm as compared to 3.3 x 3.6 cm previously.  Hyperexpanded lungs and scattered linear atelectasis. No consolidation or pleural effusions.    Echo 6/1/21  Left ventricular ejection fraction is visually estimated to be 60%.  Moderately dilated left atrium.  Mild mitral annular calcification.  Known bioprosthetic aortic valve that is functioning normally: Vmax is   2.35 m/s, mean gradient 12 mmHg.  Normal estimated right atrial pressure.   Compared to the images of the prior study done 6/24/2020, no   significant change.    CTA chest 2/2022  1.  Stable appearance of thoracic aorta with coarctation and ectasia.  2.  No evidence for dissection.  3.  Postoperative changes as described    Echo 7/2023  Compared to the prior study on 06/01/2021, left atrium is now severely   dilated.  Normal left ventricular systolic function.   Severely dilated left atrium.  Known bioprosthetic aortic valve that is functioning normally with   normal transvalvular gradients.      Medical Decision Making:  Today's Assessment / Status / Plan:     1. Pure hypercholesterolemia   ezetimibe (ZETIA) 10 MG Tab    Lipid Profile    Comp Metabolic Panel    Referral to Vascular Medicine      2. Essential hypertension  Comp Metabolic Panel    Referral to Vascular Medicine      3. S/P AVR (aortic valve replacement), bioprosthetic, 2016  Referral to Vascular Medicine      4. Pre-diabetes  Comp Metabolic Panel    Referral to Vascular Medicine      5. Aneurysm of descending thoracic aorta without rupture (HCC)  Referral to Vascular Medicine        Patient Type: Secondary Prevention    Etiology of Established CVD if Present:     1.  Aortic coarctation, asymptomatic, long-standing, mild aneurysmal dilatation distal to the lesion, appears to have a familial component  Stable on recent imaging  - Medical management as above  - Continue surveillance imaging with CTA/MRA every two years  - Again recommend a screening echocardiogram for all first-degree relatives  - Since he does not have a true aortic aneurysm or dissection I think that genetic testing would be of low yield and not indicated at present  - repeat MRA 2/2024 - not ordered     2.  Aortic valve disease status post bovine aVR in 2016, associated with aortic coarctation as above. No mention that his valve was bicuspid. Mild perivalvular leak   Stable echo 6/2021 and 7/2023  Has not seen cardiology since 2020  - Medical management as above  - continue to partner with cardiology, defer to cardio for surveillance echo ordering   -overdue for cardiology follow up, given office number to call and schedule appt for follow up    Lipid Management:   Qualifies for Statin Therapy Based on 2013 ACC/AHA Guidelines: yes  Calculated 10-Year Risk of ASCVD: N/A  Currently on Statin: Yes  -Patient with vascular disease, although not atherosclerotic - nonetheless would like to treat aggressively  Remains above goal on most recent labs 7/2023  Long discussion with pt regarding lipid goals, and intensification of treatment, reviewed risks/benefits.  Through shared  decision making pt will start zetia to help lower cholesterol as he is unlikely to achieve goal on current regimen and TLC measures alone  Plan:  - continue atorva 80mg daily   - START zetia 10 mg daily  - TLC intensification as discussed   - update labs in 3 months     Blood Pressure Management:   Acc/aha bp goal <130/80  BP seems well controlled both in office and at home  PCP changed metoprolol to carvedilol in favor of lower dose due to HR <50  HR now in 50's  - Continue carvedilol for now  - Monitor HR daily-- if HR <50, call our office  - Continue lisinopril  - Continue home blood pressure monitoring    Glycemic Status: Prediabetes  Lab Results   Component Value Date    HBA1C 5.8 (H) 07/18/2023      Plan:  - continue healthy diet, weight reduction, daily physical activity   - consider metformin up to 850mg BID to slow or offset progression to T2D as per DPP trial   - monitor labs Q6-12mo    Anti-Platelet/Anti-Coagulant Tx: yes  Patient has completed 3 month course of anticoagulation after valve replacement   - Continue indefinite aspirin    Smoking:  reports that he quit smoking about 15 years ago. His smoking use included cigarettes. He started smoking about 30 years ago. He has a 7.5 pack-year smoking history. He has never used smokeless tobacco.   Has recently started vaping with nicotine in cartridges.    Provided strong recommendation for complete cessation and informed this is the primary contributor to the majority of all ASCVD and cancer-related conditions and can result in significant morbidity and early mortality.   - reviewed resources for cessation including tobacco cessation clinic visit, pharmacotx meds, quit lines  - review at every visit   Provided 3 minutes of face-to-face counseling on above topics      Physical Activity: continue healthy activity to improve CV fitness, see care instructions for additional details     Weight Management and Nutrition: Heart healthy dietary patterns were  discussed with patient at this visit including DASH, Mediterranean diet, low sodium and/or as outlined in care instructions    Other:  None     Instructed to follow-up with PCP for remainder of adult medical needs: yes  We will partner with other providers in the management of established vascular disease and cardiometabolic risk factors.    Studies to Be Obtained:    1.  Echocardiogram July 2024, or as directed by cardiology   2.  MRA chest 2/2024- not ordered, aprn to track     Labs to Be Obtained: lipid cmp    Follow up in: 3 months to review lipid labs    MARISA Vickers.   [facility]   [facility]   [phone]   Cc:  RHEA Love

## 2023-08-24 ENCOUNTER — TELEPHONE (OUTPATIENT)
Dept: VASCULAR LAB | Facility: MEDICAL CENTER | Age: 73
End: 2023-08-24
Payer: MEDICARE

## 2023-08-24 PROCEDURE — RXMED WILLOW AMBULATORY MEDICATION CHARGE

## 2023-08-24 NOTE — TELEPHONE ENCOUNTER
Received Refill PA request via [employer]  for EZETIMIBE 10MG TABLETS. (Quantity:100, Day Supply:100)     Insurance: [employer]   Member ID:  [id number]  BIN: [id number]  PCN: [id number]  Group: [id number]     Ran Test claim via [employer] & medication Pays for a $14.40 copay    Will call Pt and offer [facility] services.     LONI Alves, PhT  Pharmacy Liaison  P: [phone]  8/24/2023 3:25 PM

## 2023-08-25 ENCOUNTER — PHARMACY VISIT (OUTPATIENT)
Dept: PHARMACY | Facility: MEDICAL CENTER | Age: 73
End: 2023-08-25
Payer: MEDICARE

## 2023-10-30 ENCOUNTER — HOSPITAL ENCOUNTER (OUTPATIENT)
Dept: LAB | Facility: MEDICAL CENTER | Age: 73
End: 2023-10-30
Payer: MEDICARE

## 2023-10-30 DIAGNOSIS — I10 ESSENTIAL HYPERTENSION: ICD-10-CM

## 2023-10-30 DIAGNOSIS — R73.03 PRE-DIABETES: ICD-10-CM

## 2023-10-30 DIAGNOSIS — E78.00 PURE HYPERCHOLESTEROLEMIA: ICD-10-CM

## 2023-10-30 LAB
ALBUMIN SERPL BCP-MCNC: 3.9 G/DL (ref 3.2–4.9)
ALBUMIN/GLOB SERPL: 1.6 G/DL
ALP SERPL-CCNC: 88 U/L (ref 30–99)
ALT SERPL-CCNC: 24 U/L (ref 2–50)
ANION GAP SERPL CALC-SCNC: 9 MMOL/L (ref 7–16)
AST SERPL-CCNC: 17 U/L (ref 12–45)
BILIRUB SERPL-MCNC: 0.5 MG/DL (ref 0.1–1.5)
BUN SERPL-MCNC: 20 MG/DL (ref 8–22)
CALCIUM ALBUM COR SERPL-MCNC: 9.1 MG/DL (ref 8.5–10.5)
CALCIUM SERPL-MCNC: 9 MG/DL (ref 8.5–10.5)
CHLORIDE SERPL-SCNC: 107 MMOL/L (ref 96–112)
CHOLEST SERPL-MCNC: 128 MG/DL (ref 100–199)
CO2 SERPL-SCNC: 25 MMOL/L (ref 20–33)
CREAT SERPL-MCNC: 1.09 MG/DL (ref 0.5–1.4)
FASTING STATUS PATIENT QL REPORTED: NORMAL
GFR SERPLBLD CREATININE-BSD FMLA CKD-EPI: 72 ML/MIN/1.73 M 2
GLOBULIN SER CALC-MCNC: 2.5 G/DL (ref 1.9–3.5)
GLUCOSE SERPL-MCNC: 97 MG/DL (ref 65–99)
HDLC SERPL-MCNC: 32 MG/DL
LDLC SERPL CALC-MCNC: 78 MG/DL
POTASSIUM SERPL-SCNC: 4.3 MMOL/L (ref 3.6–5.5)
PROT SERPL-MCNC: 6.4 G/DL (ref 6–8.2)
SODIUM SERPL-SCNC: 141 MMOL/L (ref 135–145)
TRIGL SERPL-MCNC: 90 MG/DL (ref 0–149)

## 2023-10-30 PROCEDURE — 80061 LIPID PANEL: CPT

## 2023-10-30 PROCEDURE — 80053 COMPREHEN METABOLIC PANEL: CPT

## 2023-10-30 PROCEDURE — 36415 COLL VENOUS BLD VENIPUNCTURE: CPT

## 2023-11-20 ENCOUNTER — DOCUMENTATION (OUTPATIENT)
Dept: VASCULAR LAB | Facility: MEDICAL CENTER | Age: 73
End: 2023-11-20
Payer: MEDICARE

## 2023-11-21 NOTE — PROGRESS NOTES
called and spoke to Pt regarding with his upcoming medication refill for Ezetimibe. Per Pt, he reports that he experienced some adverse side effects with Ezetimibe, primarily with muscle pain. liaison will notify the provider for the side effect, and have one from the clinical team to reach out to the Pt. Other than that, Pt states that he's doing well since after skipping this medication for about 2 months now.     LONI Alves, PhT  Pharmacy Liaison (Rx Coordinator)  P: [phone]  11/20/2023 5:11 PM

## 2023-12-04 ENCOUNTER — TELEPHONE (OUTPATIENT)
Dept: VASCULAR LAB | Facility: MEDICAL CENTER | Age: 73
End: 2023-12-04
Payer: MEDICARE

## 2023-12-04 DIAGNOSIS — R73.03 PRE-DIABETES: ICD-10-CM

## 2023-12-04 DIAGNOSIS — Q25.1 COARCTATION OF AORTA: ICD-10-CM

## 2023-12-04 DIAGNOSIS — I71.20 THORACIC AORTIC ANEURYSM WITHOUT RUPTURE, UNSPECIFIED PART (HCC): ICD-10-CM

## 2023-12-04 NOTE — TELEPHONE ENCOUNTER
----- Message from Michael J Bloch, M.D. sent at 12/1/2023  2:44 PM PST -----  Regarding: RE: Imaging Question... another  MRA with. That's generally what we need for the chest and belly (not necessarily head and neck).   ----- Message -----  From: Kitty Restrepo R.N.  Sent: 12/1/2023   1:41 PM PST  To: Michael J Bloch, M.D.  Subject: Imaging Question... another                      Pt on for MRA chest 2/2024    MRA w/ and w/o or just one?

## 2023-12-04 NOTE — TELEPHONE ENCOUNTER
MRA chest 2/2024     Orders placed for vascular surveillance imaging.    [employer] message sent to pt to remind that they are due for their surveillance vascular imaging.         Kitty Restrepo R.N.   [facility]

## 2023-12-06 ENCOUNTER — APPOINTMENT (OUTPATIENT)
Dept: VASCULAR LAB | Facility: MEDICAL CENTER | Age: 73
End: 2023-12-06
Payer: MEDICARE

## 2023-12-09 DIAGNOSIS — I10 ESSENTIAL HYPERTENSION: ICD-10-CM

## 2023-12-11 RX ORDER — LISINOPRIL 20 MG/1
20 TABLET ORAL DAILY
Qty: 100 TABLET | Refills: 3 | Status: SHIPPED | OUTPATIENT
Start: 2023-12-11

## 2024-01-19 ENCOUNTER — HOSPITAL ENCOUNTER (OUTPATIENT)
Dept: LAB | Facility: MEDICAL CENTER | Age: 74
End: 2024-01-19
Attending: FAMILY MEDICINE
Payer: MEDICARE

## 2024-01-19 DIAGNOSIS — Q25.1 COARCTATION OF AORTA: ICD-10-CM

## 2024-01-19 DIAGNOSIS — R73.03 PRE-DIABETES: ICD-10-CM

## 2024-01-19 LAB
ANION GAP SERPL CALC-SCNC: 7 MMOL/L (ref 7–16)
BASOPHILS # BLD AUTO: 0.7 % (ref 0–1.8)
BASOPHILS # BLD: 0.04 K/UL (ref 0–0.12)
BUN SERPL-MCNC: 15 MG/DL (ref 8–22)
CALCIUM SERPL-MCNC: 8.7 MG/DL (ref 8.5–10.5)
CHLORIDE SERPL-SCNC: 104 MMOL/L (ref 96–112)
CO2 SERPL-SCNC: 26 MMOL/L (ref 20–33)
CREAT SERPL-MCNC: 0.9 MG/DL (ref 0.5–1.4)
EOSINOPHIL # BLD AUTO: 0.13 K/UL (ref 0–0.51)
EOSINOPHIL NFR BLD: 2.2 % (ref 0–6.9)
ERYTHROCYTE [DISTWIDTH] IN BLOOD BY AUTOMATED COUNT: 44.4 FL (ref 35.9–50)
EST. AVERAGE GLUCOSE BLD GHB EST-MCNC: 128 MG/DL
GFR SERPLBLD CREATININE-BSD FMLA CKD-EPI: 90 ML/MIN/1.73 M 2
GLUCOSE SERPL-MCNC: 103 MG/DL (ref 65–99)
HBA1C MFR BLD: 6.1 % (ref 4–5.6)
HCT VFR BLD AUTO: 49.8 % (ref 42–52)
HGB BLD-MCNC: 16.1 G/DL (ref 14–18)
IMM GRANULOCYTES # BLD AUTO: 0.01 K/UL (ref 0–0.11)
IMM GRANULOCYTES NFR BLD AUTO: 0.2 % (ref 0–0.9)
LYMPHOCYTES # BLD AUTO: 2.03 K/UL (ref 1–4.8)
LYMPHOCYTES NFR BLD: 34.7 % (ref 22–41)
MCH RBC QN AUTO: 30.8 PG (ref 27–33)
MCHC RBC AUTO-ENTMCNC: 32.3 G/DL (ref 32.3–36.5)
MCV RBC AUTO: 95.2 FL (ref 81.4–97.8)
MONOCYTES # BLD AUTO: 0.52 K/UL (ref 0–0.85)
MONOCYTES NFR BLD AUTO: 8.9 % (ref 0–13.4)
NEUTROPHILS # BLD AUTO: 3.12 K/UL (ref 1.82–7.42)
NEUTROPHILS NFR BLD: 53.3 % (ref 44–72)
NRBC # BLD AUTO: 0 K/UL
NRBC BLD-RTO: 0 /100 WBC (ref 0–0.2)
PLATELET # BLD AUTO: 269 K/UL (ref 164–446)
PMV BLD AUTO: 9.5 FL (ref 9–12.9)
POTASSIUM SERPL-SCNC: 4.1 MMOL/L (ref 3.6–5.5)
RBC # BLD AUTO: 5.23 M/UL (ref 4.7–6.1)
SODIUM SERPL-SCNC: 137 MMOL/L (ref 135–145)
WBC # BLD AUTO: 5.9 K/UL (ref 4.8–10.8)

## 2024-01-19 PROCEDURE — 83036 HEMOGLOBIN GLYCOSYLATED A1C: CPT

## 2024-01-19 PROCEDURE — 85025 COMPLETE CBC W/AUTO DIFF WBC: CPT

## 2024-01-19 PROCEDURE — 80048 BASIC METABOLIC PNL TOTAL CA: CPT

## 2024-01-19 PROCEDURE — 36415 COLL VENOUS BLD VENIPUNCTURE: CPT

## 2024-01-22 ENCOUNTER — OFFICE VISIT (OUTPATIENT)
Dept: MEDICAL GROUP | Facility: MEDICAL CENTER | Age: 74
End: 2024-01-22
Payer: MEDICARE

## 2024-01-22 VITALS
SYSTOLIC BLOOD PRESSURE: 118 MMHG | HEIGHT: 66 IN | BODY MASS INDEX: 28.59 KG/M2 | TEMPERATURE: 97.9 F | DIASTOLIC BLOOD PRESSURE: 60 MMHG | OXYGEN SATURATION: 95 % | WEIGHT: 177.91 LBS | HEART RATE: 64 BPM

## 2024-01-22 DIAGNOSIS — E78.00 PURE HYPERCHOLESTEROLEMIA: ICD-10-CM

## 2024-01-22 DIAGNOSIS — Z85.46 PERSONAL HISTORY OF MALIGNANT NEOPLASM OF PROSTATE: ICD-10-CM

## 2024-01-22 DIAGNOSIS — R73.03 PRE-DIABETES: ICD-10-CM

## 2024-01-22 DIAGNOSIS — M79.10 MYALGIA: ICD-10-CM

## 2024-01-22 DIAGNOSIS — G25.0 BENIGN ESSENTIAL TREMOR: ICD-10-CM

## 2024-01-22 DIAGNOSIS — I10 ESSENTIAL HYPERTENSION: ICD-10-CM

## 2024-01-22 DIAGNOSIS — H61.21 IMPACTED CERUMEN OF RIGHT EAR: ICD-10-CM

## 2024-01-22 PROCEDURE — 3078F DIAST BP <80 MM HG: CPT | Performed by: FAMILY MEDICINE

## 2024-01-22 PROCEDURE — 3074F SYST BP LT 130 MM HG: CPT | Performed by: FAMILY MEDICINE

## 2024-01-22 PROCEDURE — 99214 OFFICE O/P EST MOD 30 MIN: CPT | Performed by: FAMILY MEDICINE

## 2024-01-22 RX ORDER — PRIMIDONE 50 MG/1
50 TABLET ORAL
Qty: 90 TABLET | Refills: 1 | Status: SHIPPED | OUTPATIENT
Start: 2024-01-22

## 2024-01-22 RX ORDER — TADALAFIL 20 MG/1
20 TABLET ORAL PRN
COMMUNITY

## 2024-01-22 ASSESSMENT — PATIENT HEALTH QUESTIONNAIRE - PHQ9: CLINICAL INTERPRETATION OF PHQ2 SCORE: 0

## 2024-01-22 ASSESSMENT — FIBROSIS 4 INDEX: FIB4 SCORE: 0.93

## 2024-01-22 NOTE — PROGRESS NOTES
Subjective:   CC: Myalgia, hypertension follow-up    HPI:     Juan Berumen is a 73 y.o. male, established patient of the clinic.     Patient has chronic hyperlipidemia.  He was taking atorvastatin 80 mg daily.  Zetia 10 mg was recently added by vascular medicine.  Patient complains of acute development of diffuse myalgia when he was taking atorvastatin and Zetia.  Patient states that his symptoms are so severe that he was not able to walk.  His symptoms are significantly better after discontinuation of Zetia.  He continues to take Lipitor 80 mg daily.  Patient has chronic essential tremor.  He tried medication in the past, but discontinued due to ineffectiveness.  However, patient and his wife are concerns of worsening essential tremor.  Patient is interested in treatment for this condition.  Recent A1c was 6.1, slightly worse compared to previous labs.  Patient has not been eating well because of the holidays.  He has not been able to exercises regularly due to cold weather.  He declined pharmacotherapy.  He wishes to continue to work on dietary and lifestyle modification to improve this condition.  He has history of prostate cancer status post prostatectomy.  He continues to follow-up with urology regularly as directed.    Current medicines (including changes today)  Current Outpatient Medications   Medication Sig Dispense Refill    primidone (MYSOLINE) 50 MG Tab Take 1 Tablet by mouth at bedtime. 90 Tablet 1    tadalafil (CIALIS) 20 MG tablet Take 20 mg by mouth as needed for Erectile Dysfunction.      lisinopril (PRINIVIL) 20 MG Tab Take 1 tablet by mouth once daily 100 Tablet 3    coenzyme Q-10 30 MG capsule Take 60 mg by mouth every day.      vitamin E 180 MG (400 UNIT) Cap Take 180 mg by mouth every day.      atorvastatin (LIPITOR) 80 MG tablet Take 1 Tablet by mouth every evening. 100 Tablet 3    carvedilol (COREG) 3.125 MG Tab Take 1 Tablet by mouth 2 times a day. 200 Tablet 3    Omega-3 Fatty Acids  "(FISH OIL) 1000 MG Cap capsule Take 1,000 mg by mouth every day.      aspirin (ASA) 81 MG Chew Tab chewable tablet Take 1 Tab by mouth every day. 100 Tab 11    therapeutic multivitamin-minerals (THERAGRAN-M) TABS Take 1 Tab by mouth every day.       No current facility-administered medications for this visit.     He  has a past medical history of Anxiety, Aortic coarctation, Aortic valve stenosis, Chest pain (4/23/2016), Depression, Headache(784.0), Heart murmur, Hyperlipidemia, Hypertension, Migraine, Muscle, jerky movements (uncontrolled) (1974), Nodular hyperplasia of prostate gland (4/11/2011), NSTEMI (non-ST elevated myocardial infarction) (HCC) (4/25/2016), Prostate cancer (HCC), Prostate cancer (HCC), RLQ abdominal pain (7/1/2016), and Tachycardia (4/23/2016).    He has no past medical history of GERD (gastroesophageal reflux disease), Thyroid disease, Tremor, essential, Ulcer, or Urinary tract infection, site not specified.    I reviewed patient's problem list, allergies, medications, family hx, social hx with patient and update EPIC.        Objective:     /60 (BP Location: Left arm, Patient Position: Sitting, BP Cuff Size: Adult)   Pulse 64   Temp 36.6 °C (97.9 °F) (Temporal)   Ht 1.676 m (5' 6\")   Wt 80.7 kg (177 lb 14.6 oz)   SpO2 95%  Body mass index is 28.72 kg/m².    Physical Exam:  Constitutional: awake, alert, in no distress.  Skin: Warm, dry, good turgor, no rashes, bruises, ulcers in visible areas.  Eye: conjunctiva clear, lids neg for edema or lesions.  ENMT: Cerumen impaction on the right.  Oral and nasal mucosa wnl. Lips without lesions, good dentition, oropharynx clear.  Neck: Trachea midline, no masses, no thyromegaly. No cervical or supraclavicular lymphadenopathy  Respiratory: Unlabored respiratory effort, lungs clear to auscultation, no wheezes, no rales.  Cardiovascular: Normal S1, S2, no murmur, no pedal edema.  Psych: Oriented x3, affect and mood wnl, intact judgement and " insight.       Assessment and Plan:   The following treatment plan was discussed    1. Essential hypertension  Chronic, controlled with lisinopril 20 mg daily and Coreg 3.125 mg twice daily, no s/e reported, will continue.    - CBC WITH DIFFERENTIAL; Future  - Comp Metabolic Panel; Future  - MICROALBUMIN CREAT RATIO URINE; Future    2. Pure hypercholesterolemia  3. Myalgia with Lipitor and Zetia.   Chronic, controlled with Lipitor 80 mg daily.  He complains of severe myalgia with Lipitor 80 mg and Zetia 10 mg.  Myalgia slowly resolved after discontinuation of Zetia.  - CBC WITH DIFFERENTIAL; Future  - Comp Metabolic Panel; Future  - Lipid Profile; Future  - continue Lipitor 80 mg daily    4. BMI 28.0-28.9,adult  - Patient identified as having weight management issue.  Appropriate orders and counseling given.     5. Pre-diabetes  Most recent A1c was 6.1.  - Dietary/lifestyle modification and weight loss    - HEMOGLOBIN A1C; Future    6. Benign essential tremor  Chronic worsening essential tremor.  Patient would like to try medication for symptoms.  - primidone (MYSOLINE) 50 MG Tab; Take 1 Tablet by mouth at bedtime.  Dispense: 90 Tablet; Refill: 1  -Follow-up in 6 months or sooner as needed    7. Personal history of malignant neoplasm of prostate[facility]   Patient is status post prostatectomy.  Denies active urogenital symptoms or back pain.  Patient continues to follow-up with urology regularly as directed.  - PSA TOTAL + %FREE; Future    8. Impacted cerumen of right ear  -curettage      HCC Gap Form    Diagnosis to address: I71.23 - Aneurysm of descending thoracic aorta without rupture (HCC)  Assessment and plan: Chronic, stable, currently being managed by vascular medicine. Doing well, will continue to monitor.   Last edited 01/22/24 15:14 PST by Ekaterina Love M.D.         I personally removed ear wax from right ear using a lighted curette. Patient tolerated the procedure well, no immediate complication  noted.      Ekaterina Love M.D.      Followup: Return in about 6 months (around 7/22/2024) for Multiple issues.    Please note that this dictation was created using voice recognition software. I have made every reasonable attempt to correct obvious errors, but I expect that there are errors of grammar and possibly content that I did not discover before finalizing the note.

## 2024-02-01 ENCOUNTER — APPOINTMENT (OUTPATIENT)
Dept: RADIOLOGY | Facility: MEDICAL CENTER | Age: 74
End: 2024-02-01
Attending: INTERNAL MEDICINE
Payer: MEDICARE

## 2024-02-25 ENCOUNTER — HOSPITAL ENCOUNTER (OUTPATIENT)
Dept: RADIOLOGY | Facility: MEDICAL CENTER | Age: 74
End: 2024-02-25
Attending: INTERNAL MEDICINE
Payer: MEDICARE

## 2024-02-25 DIAGNOSIS — I71.20 THORACIC AORTIC ANEURYSM WITHOUT RUPTURE, UNSPECIFIED PART (HCC): ICD-10-CM

## 2024-02-25 DIAGNOSIS — Q25.1 COARCTATION OF AORTA: ICD-10-CM

## 2024-02-25 PROCEDURE — C8911 MRA W/O FOL W/CONT, CHEST: HCPCS

## 2024-02-25 PROCEDURE — 700117 HCHG RX CONTRAST REV CODE 255: Performed by: INTERNAL MEDICINE

## 2024-02-25 PROCEDURE — A9579 GAD-BASE MR CONTRAST NOS,1ML: HCPCS | Performed by: INTERNAL MEDICINE

## 2024-02-25 RX ADMIN — GADOTERIDOL 20 ML: 279.3 INJECTION, SOLUTION INTRAVENOUS at 11:45

## 2024-02-26 ENCOUNTER — DOCUMENTATION (OUTPATIENT)
Dept: VASCULAR LAB | Facility: MEDICAL CENTER | Age: 74
End: 2024-02-26
Payer: MEDICARE

## 2024-02-26 NOTE — PROGRESS NOTES
MRA chest with no significant interval change in proximal descending thoracic aortic coarctation and associated descending thoracic aortic ectasia    Will plan repeat CTA chest in 2 years -February 2026    Patient overdue for vascular medicine appointment, will ask MA to contact patient to get scheduled    Michael Bloch, MD  [facility]

## 2024-02-27 ENCOUNTER — APPOINTMENT (OUTPATIENT)
Dept: RADIOLOGY | Facility: MEDICAL CENTER | Age: 74
End: 2024-02-27
Attending: INTERNAL MEDICINE
Payer: MEDICARE

## 2024-02-28 ENCOUNTER — DOCUMENTATION (OUTPATIENT)
Dept: VASCULAR LAB | Facility: MEDICAL CENTER | Age: 74
End: 2024-02-28
Payer: MEDICARE

## 2024-02-28 NOTE — PROGRESS NOTES
Left message for patient to really below message from Dr. Bloch:    Michael J Bloch, M.D.  Kitty Restrepo R.N.; P [facility]  BC -[place] MRA chest was done.  Put on counter for repeat CTA chest in February 2026    MAs -please let patient know that his MRA is stable.  He is overdue for follow-up appointment.  Please schedule with APN        Asked patient to call back to schedule follow up appt with APN.         [place], Medical Assistant   [facility]   Ph: [phone]  Fx: [phone]

## 2024-03-05 ENCOUNTER — TELEPHONE (OUTPATIENT)
Dept: HEALTH INFORMATION MANAGEMENT | Facility: OTHER | Age: 74
End: 2024-03-05

## 2024-03-13 ENCOUNTER — OFFICE VISIT (OUTPATIENT)
Dept: VASCULAR LAB | Facility: MEDICAL CENTER | Age: 74
End: 2024-03-13
Payer: MEDICARE

## 2024-03-13 VITALS
SYSTOLIC BLOOD PRESSURE: 150 MMHG | BODY MASS INDEX: 28.41 KG/M2 | HEART RATE: 65 BPM | HEIGHT: 67 IN | DIASTOLIC BLOOD PRESSURE: 80 MMHG | WEIGHT: 181 LBS

## 2024-03-13 DIAGNOSIS — E78.00 PURE HYPERCHOLESTEROLEMIA: ICD-10-CM

## 2024-03-13 DIAGNOSIS — I71.23 ANEURYSM OF DESCENDING THORACIC AORTA WITHOUT RUPTURE (HCC): ICD-10-CM

## 2024-03-13 DIAGNOSIS — R73.03 PRE-DIABETES: ICD-10-CM

## 2024-03-13 DIAGNOSIS — I10 ESSENTIAL HYPERTENSION: ICD-10-CM

## 2024-03-13 DIAGNOSIS — Z95.2 S/P AVR (AORTIC VALVE REPLACEMENT): ICD-10-CM

## 2024-03-13 DIAGNOSIS — Q25.1 COARCTATION OF AORTA: ICD-10-CM

## 2024-03-13 PROCEDURE — 3079F DIAST BP 80-89 MM HG: CPT

## 2024-03-13 PROCEDURE — 3077F SYST BP >= 140 MM HG: CPT

## 2024-03-13 PROCEDURE — 99212 OFFICE O/P EST SF 10 MIN: CPT

## 2024-03-13 PROCEDURE — 99214 OFFICE O/P EST MOD 30 MIN: CPT

## 2024-03-13 ASSESSMENT — ENCOUNTER SYMPTOMS
SHORTNESS OF BREATH: 0
BRUISES/BLEEDS EASILY: 1
DOUBLE VISION: 0
FOCAL WEAKNESS: 0
HEADACHES: 0
SPEECH CHANGE: 0
MYALGIAS: 0
WEAKNESS: 0
DIZZINESS: 0
PALPITATIONS: 0
BLURRED VISION: 0
BLOOD IN STOOL: 0
CLAUDICATION: 0

## 2024-03-13 ASSESSMENT — FIBROSIS 4 INDEX: FIB4 SCORE: 0.93

## 2024-03-13 NOTE — PROGRESS NOTES
Follow Up VASCULAR VISIT  Subjective:   Juan Berumen is a 73 y.o. male who presents today 03/13/2024 for   Chief Complaint   Patient presents with    Follow-Up   Here for f/u of coarctation, valvular heart disease, hypertension, and dyslipidemia      HPI:  Doing well, no concerns  Has been following up with PCP  Had imaging done - reviewed  Had labs done - reviewed  Continues vaping daily, +nicotine in cartridges, trying to cut down  Has gained weight  More sweets and carbs lately,  Less exercise due to weather    Coarctation, s/p AVR:   No symptoms.  Feeling well.   Has not seen cardiology since 2020     HTN:  BP at home usually 120/50-60s  Home machine is 10+ years old, checking only about once a week  Tolerating all meds   Denies fatigue     HLD:  On atorvastatin 80mg-- no ADRs  No myalgias on atorva alone  Did not tolerate addition of zetia, had worsening myalgias after starting.  Resolved once stopped.      On ASA-- no bleeding issues      Current Outpatient Medications:     primidone, 50 mg, Oral, QHS, Taking    tadalafil, 20 mg, Oral, PRN, Taking    lisinopril, 20 mg, Oral, DAILY, Taking    coenzyme Q-10, 60 mg, Oral, DAILY, Taking    vitamin E, 180 mg, Oral, DAILY, Taking    atorvastatin, 80 mg, Oral, Q EVENING, Taking    carvedilol, 3.125 mg, Oral, BID, Taking    fish oil, 1,000 mg, Oral, DAILY, Taking    aspirin, 81 mg, Oral, DAILY, Taking    therapeutic multivitamin-minerals, 1 Tablet, Oral, DAILY, Taking     Social History     Tobacco Use   Smoking Status Former    Current packs/day: 0.00    Average packs/day: 0.5 packs/day for 15.0 years (7.5 ttl pk-yrs)    Types: Cigarettes    Start date: 4/11/1993    Quit date: 4/11/2008    Years since quitting: 15.9   Smokeless Tobacco Never     DIET AND EXERCISE:  Weight Change: up about 10 pounds  BMI Readings from Last 5 Encounters:   03/13/24 28.35 kg/m²   01/22/24 28.72 kg/m²   08/23/23 27.12 kg/m²   07/20/23 27.26 kg/m²   01/19/23 27.44 kg/m²      Diet:  "common adult  Exercise: moderate regular exercise program walking 3 miles daily  With dog - limited by weather and knee pain     Review of Systems   Constitutional:  Negative for malaise/fatigue.   HENT:  Negative for nosebleeds.    Eyes:  Negative for blurred vision and double vision.   Respiratory:  Negative for shortness of breath.    Cardiovascular:  Negative for chest pain, palpitations, claudication and leg swelling.   Gastrointestinal:  Negative for blood in stool and melena.   Genitourinary:  Negative for hematuria.   Musculoskeletal:  Negative for joint pain and myalgias.   Neurological:  Negative for dizziness, speech change, focal weakness, weakness and headaches.   Endo/Heme/Allergies:  Bruises/bleeds easily.      Objective:     Vitals:    03/13/24 1409 03/13/24 1413   BP: (!) 150/80 (!) 150/80   BP Location: Left arm Left arm   Patient Position: Sitting Sitting   BP Cuff Size: Adult Adult   Pulse: 60 65   Weight: 82.1 kg (181 lb)    Height: 1.702 m (5' 7\")       BP Readings from Last 5 Encounters:   03/13/24 (!) 150/80   01/22/24 118/60   08/23/23 114/70   07/20/23 130/70   01/19/23 116/64      Body mass index is 28.35 kg/m².  Physical Exam  Vitals reviewed.   Constitutional:       General: He is not in acute distress.     Appearance: He is well-developed. He is not diaphoretic.   HENT:      Head: Normocephalic and atraumatic.   Eyes:      General: No scleral icterus.     Conjunctiva/sclera: Conjunctivae normal.   Cardiovascular:      Rate and Rhythm: Normal rate and regular rhythm.      Pulses:           Posterior tibial pulses are 2+ on the right side and 2+ on the left side.      Heart sounds: Murmur heard.   Pulmonary:      Effort: Pulmonary effort is normal. No respiratory distress.      Breath sounds: Normal breath sounds. No wheezing or rales.   Musculoskeletal:      Right lower leg: No edema.      Left lower leg: No edema.   Skin:     General: Skin is warm and dry.      Coloration: Skin is not " pale.   Neurological:      General: No focal deficit present.      Mental Status: He is alert and oriented to person, place, and time.      Coordination: Coordination normal.      Gait: Gait normal.   Psychiatric:         Behavior: Behavior normal.       Lab Results   Component Value Date    CHOLSTRLTOT 128 10/30/2023    LDL 78 10/30/2023    HDL 32 (A) 10/30/2023    TRIGLYCERIDE 90 10/30/2023      Lab Results   Component Value Date    SODIUM 137 01/19/2024    POTASSIUM 4.1 01/19/2024    CHLORIDE 104 01/19/2024    CO2 26 01/19/2024    GLUCOSE 103 (H) 01/19/2024    BUN 15 01/19/2024    CREATININE 0.90 01/19/2024      Lab Results   Component Value Date    WBC 5.9 01/19/2024    RBC 5.23 01/19/2024    HEMOGLOBIN 16.1 01/19/2024    HEMATOCRIT 49.8 01/19/2024    MCV 95.2 01/19/2024    MCH 30.8 01/19/2024    MCHC 32.3 01/19/2024    MPV 9.5 01/19/2024      CTA chest 3/2020   Coarctations aorta similar to previous findings.  Interval sternotomy and placement aortic valvular prosthesis. The ascending aorta measures 3.1 x 3.4 cm as compared to 3.3 x 3.6 cm previously.  Hyperexpanded lungs and scattered linear atelectasis. No consolidation or pleural effusions.    Echo 6/1/21  Left ventricular ejection fraction is visually estimated to be 60%.  Moderately dilated left atrium.  Mild mitral annular calcification.  Known bioprosthetic aortic valve that is functioning normally: Vmax is   2.35 m/s, mean gradient 12 mmHg.  Normal estimated right atrial pressure.   Compared to the images of the prior study done 6/24/2020, no   significant change.    CTA chest 2/2022  1.  Stable appearance of thoracic aorta with coarctation and ectasia.  2.  No evidence for dissection.  3.  Postoperative changes as described    Echo 7/2023  Compared to the prior study on 06/01/2021, left atrium is now severely   dilated.  Normal left ventricular systolic function.   Severely dilated left atrium.  Known bioprosthetic aortic valve that is functioning  normally with   normal transvalvular gradients.    MRA chest 2/2024  1.  Overall there is no significant interval change in the proximal descending thoracic aortic coarctation with associated descending thoracic aortic ectasia.  2.  No ascending thoracic aortic ectasia.  3.  No thoracic aortic dissection.    Medical Decision Making:  Today's Assessment / Status / Plan:     1. Pre-diabetes        2. Coarctation of aorta        3. Essential hypertension        4. S/P AVR (aortic valve replacement), bioprosthetic, 2016        5. Aneurysm of descending thoracic aorta without rupture (HCC)        6. Pure hypercholesterolemia            Patient Type: Secondary Prevention    Etiology of Established CVD if Present:     1.  Aortic coarctation, asymptomatic, long-standing, mild aneurysmal dilatation distal to the lesion, appears to have a familial component  Stable on recent imaging  - Medical management as above  - Continue surveillance imaging with CTA/MRA every two years  - Again recommend a screening echocardiogram for all first-degree relatives  - Since he does not have a true aortic aneurysm or dissection I think that genetic testing would be of low yield and not indicated at present  - repeat CTA chest due 2/2026 - not ordered     2.  Aortic valve disease status post bovine aVR in 2016, associated with aortic coarctation as above. No mention that his valve was bicuspid. Mild perivalvular leak   Stable echo 6/2021 and 7/2023  Has not seen cardiology since 2020  - Medical management as above  - continue to partner with cardiology, defer to cardio for surveillance echo ordering   -overdue for cardiology follow up, given office number to call and schedule appt for follow up - reminded    Lipid Management:   Qualifies for Statin Therapy Based on 2013 ACC/AHA Guidelines: yes  Calculated 10-Year Risk of ASCVD: N/A  Currently on Statin: Yes  -Patient with vascular disease, although not atherosclerotic - nonetheless would like  to treat aggressively  Did not tolerate zetia - myalgias which resolved with cessation  Tolerating current dose of atorvastatin  Good control noted on last labs 10/2023, no current labs  Suspect will be above goal range due to recent dietary indiscretions and decreased activities  Has upcoming labs per PCP  Through shared decision making pt will refocus on lifestyle, diet/exercise for now.    Reviewed med diet and provided handouts  Plan:  - continue atorva 80mg daily   - TLC intensification as discussed   - update labs in 3 months as per PCP  - intensify therapy if remains above goal.    Blood Pressure Management:   Acc/aha bp goal <130/80  BP seems well controlled at home per report, 120s/50-60s, but rare checking  Elevated in office today, notes was stressed with parking and rushing to get checked in on time.    PCP changed metoprolol to carvedilol in favor of lower dose due to HR <50  HR now in 50's  Has older home BP machine, recommend obtain new machine, provided handout.  - obtain new BP machine, check bps 3-4xs/week, and bring in log to next appt  - Continue carvedilol 3.125mg BID for now  - Monitor HR daily-- if HR <50, call our office  - Continue lisinopril 20mg daily  - Continue home blood pressure monitoring  - contact office if BP consistently >140/90 for earlier appt, as medications may need to be adjusted.      Glycemic Status: Prediabetes  Lab Results   Component Value Date    HBA1C 6.1 (H) 01/19/2024   Increased from last, long discussion on diet/exercise and provided prediabetes handout for review   Plan:  - continue healthy diet, weight reduction, daily physical activity   - consider metformin up to 850mg BID to slow or offset progression to T2D as per DPP trial   - monitor A1c prior to next appt    Anti-Platelet/Anti-Coagulant Tx: yes  Patient has completed 3 month course of anticoagulation after valve replacement   - Continue indefinite aspirin    Smoking:  reports that he quit smoking about 15  years ago. His smoking use included cigarettes. He started smoking about 30 years ago. He has a 7.5 pack-year smoking history. He has never used smokeless tobacco.   Has recently started vaping with nicotine in cartridges.    Provided strong recommendation for complete cessation and informed this is the primary contributor to the majority of all ASCVD and cancer-related conditions and can result in significant morbidity and early mortality.   - reviewed resources for cessation including tobacco cessation clinic visit, pharmacotx meds, quit lines  - review at every visit   Provided 1 minutes of face-to-face counseling on above topics      Physical Activity: continue healthy activity to improve CV fitness, see care instructions for additional details.  Return back to daily walking.       Weight Management and Nutrition: Heart healthy dietary patterns were discussed with patient at this visit including DASH, Mediterranean diet, low sodium and/or as outlined in care instructions.  Decrease sweets/treats and carbohydrates, focus more on lean proteins and fresh veggies/greens.  Encourage to reduce portion sizes.      Other:  None     Instructed to follow-up with PCP for remainder of adult medical needs: yes  We will partner with other providers in the management of established vascular disease and cardiometabolic risk factors.    Studies to Be Obtained:    1.  Echocardiogram July 2024, or as directed by cardiology   2.  CTA chest 2/2026- not ordered,      Labs to Be Obtained: as ordered by PCP    Follow up in: 3-4 months to review lipid labs    MARISA Vickers.   [facility]   [facility]   [phone]   Cc:  RHEA Love

## 2024-03-29 DIAGNOSIS — E78.00 PURE HYPERCHOLESTEROLEMIA: ICD-10-CM

## 2024-03-30 RX ORDER — ATORVASTATIN CALCIUM 80 MG/1
80 TABLET, FILM COATED ORAL EVERY EVENING
Qty: 100 TABLET | Refills: 3 | Status: SHIPPED | OUTPATIENT
Start: 2024-03-30

## 2024-05-12 DIAGNOSIS — I10 ESSENTIAL HYPERTENSION: ICD-10-CM

## 2024-05-13 RX ORDER — CARVEDILOL 3.12 MG/1
3.12 TABLET ORAL 2 TIMES DAILY
Qty: 200 TABLET | Refills: 3 | Status: SHIPPED | OUTPATIENT
Start: 2024-05-13

## 2024-07-08 ENCOUNTER — DOCUMENTATION (OUTPATIENT)
Dept: VASCULAR LAB | Facility: MEDICAL CENTER | Age: 74
End: 2024-07-08
Payer: MEDICARE

## 2024-07-12 DIAGNOSIS — G25.0 BENIGN ESSENTIAL TREMOR: ICD-10-CM

## 2024-07-15 ENCOUNTER — HOSPITAL ENCOUNTER (OUTPATIENT)
Dept: LAB | Facility: MEDICAL CENTER | Age: 74
End: 2024-07-15
Attending: FAMILY MEDICINE
Payer: MEDICARE

## 2024-07-15 DIAGNOSIS — R73.03 PRE-DIABETES: ICD-10-CM

## 2024-07-15 DIAGNOSIS — E78.00 PURE HYPERCHOLESTEROLEMIA: ICD-10-CM

## 2024-07-15 DIAGNOSIS — I10 ESSENTIAL HYPERTENSION: ICD-10-CM

## 2024-07-15 DIAGNOSIS — Z85.46 PERSONAL HISTORY OF MALIGNANT NEOPLASM OF PROSTATE: ICD-10-CM

## 2024-07-15 LAB
ALBUMIN SERPL BCP-MCNC: 4 G/DL (ref 3.2–4.9)
ALBUMIN/GLOB SERPL: 1.7 G/DL
ALP SERPL-CCNC: 86 U/L (ref 30–99)
ALT SERPL-CCNC: 31 U/L (ref 2–50)
ANION GAP SERPL CALC-SCNC: 8 MMOL/L (ref 7–16)
AST SERPL-CCNC: 26 U/L (ref 12–45)
BASOPHILS # BLD AUTO: 0.6 % (ref 0–1.8)
BASOPHILS # BLD: 0.03 K/UL (ref 0–0.12)
BILIRUB SERPL-MCNC: 0.6 MG/DL (ref 0.1–1.5)
BUN SERPL-MCNC: 13 MG/DL (ref 8–22)
CALCIUM ALBUM COR SERPL-MCNC: 8.8 MG/DL (ref 8.5–10.5)
CALCIUM SERPL-MCNC: 8.8 MG/DL (ref 8.5–10.5)
CHLORIDE SERPL-SCNC: 105 MMOL/L (ref 96–112)
CHOLEST SERPL-MCNC: 143 MG/DL (ref 100–199)
CO2 SERPL-SCNC: 26 MMOL/L (ref 20–33)
CREAT SERPL-MCNC: 1.04 MG/DL (ref 0.5–1.4)
CREAT UR-MCNC: 113.37 MG/DL
EOSINOPHIL # BLD AUTO: 0.06 K/UL (ref 0–0.51)
EOSINOPHIL NFR BLD: 1.2 % (ref 0–6.9)
ERYTHROCYTE [DISTWIDTH] IN BLOOD BY AUTOMATED COUNT: 44.3 FL (ref 35.9–50)
EST. AVERAGE GLUCOSE BLD GHB EST-MCNC: 117 MG/DL
FASTING STATUS PATIENT QL REPORTED: NORMAL
GFR SERPLBLD CREATININE-BSD FMLA CKD-EPI: 76 ML/MIN/1.73 M 2
GLOBULIN SER CALC-MCNC: 2.4 G/DL (ref 1.9–3.5)
GLUCOSE SERPL-MCNC: 106 MG/DL (ref 65–99)
HBA1C MFR BLD: 5.7 % (ref 4–5.6)
HCT VFR BLD AUTO: 48.7 % (ref 42–52)
HDLC SERPL-MCNC: 36 MG/DL
HGB BLD-MCNC: 16.1 G/DL (ref 14–18)
IMM GRANULOCYTES # BLD AUTO: 0.02 K/UL (ref 0–0.11)
IMM GRANULOCYTES NFR BLD AUTO: 0.4 % (ref 0–0.9)
LDLC SERPL CALC-MCNC: 90 MG/DL
LYMPHOCYTES # BLD AUTO: 1.83 K/UL (ref 1–4.8)
LYMPHOCYTES NFR BLD: 35.8 % (ref 22–41)
MCH RBC QN AUTO: 30.7 PG (ref 27–33)
MCHC RBC AUTO-ENTMCNC: 33.1 G/DL (ref 32.3–36.5)
MCV RBC AUTO: 92.9 FL (ref 81.4–97.8)
MICROALBUMIN UR-MCNC: <1.2 MG/DL
MICROALBUMIN/CREAT UR: NORMAL MG/G (ref 0–30)
MONOCYTES # BLD AUTO: 0.44 K/UL (ref 0–0.85)
MONOCYTES NFR BLD AUTO: 8.6 % (ref 0–13.4)
NEUTROPHILS # BLD AUTO: 2.73 K/UL (ref 1.82–7.42)
NEUTROPHILS NFR BLD: 53.4 % (ref 44–72)
NRBC # BLD AUTO: 0 K/UL
NRBC BLD-RTO: 0 /100 WBC (ref 0–0.2)
PLATELET # BLD AUTO: 264 K/UL (ref 164–446)
PMV BLD AUTO: 9.6 FL (ref 9–12.9)
POTASSIUM SERPL-SCNC: 4.3 MMOL/L (ref 3.6–5.5)
PROT SERPL-MCNC: 6.4 G/DL (ref 6–8.2)
RBC # BLD AUTO: 5.24 M/UL (ref 4.7–6.1)
SODIUM SERPL-SCNC: 139 MMOL/L (ref 135–145)
TRIGL SERPL-MCNC: 85 MG/DL (ref 0–149)
WBC # BLD AUTO: 5.1 K/UL (ref 4.8–10.8)

## 2024-07-15 PROCEDURE — 82043 UR ALBUMIN QUANTITATIVE: CPT

## 2024-07-15 PROCEDURE — 84153 ASSAY OF PSA TOTAL: CPT

## 2024-07-15 PROCEDURE — 82570 ASSAY OF URINE CREATININE: CPT

## 2024-07-15 PROCEDURE — 36415 COLL VENOUS BLD VENIPUNCTURE: CPT

## 2024-07-15 PROCEDURE — 84154 ASSAY OF PSA FREE: CPT

## 2024-07-15 PROCEDURE — 83036 HEMOGLOBIN GLYCOSYLATED A1C: CPT

## 2024-07-15 PROCEDURE — 85025 COMPLETE CBC W/AUTO DIFF WBC: CPT

## 2024-07-15 PROCEDURE — 80053 COMPREHEN METABOLIC PANEL: CPT

## 2024-07-15 PROCEDURE — 80061 LIPID PANEL: CPT

## 2024-07-15 RX ORDER — PRIMIDONE 50 MG/1
50 TABLET ORAL
Qty: 90 TABLET | Refills: 3 | Status: SHIPPED | OUTPATIENT
Start: 2024-07-15

## 2024-07-16 LAB
PSA FREE MFR SERPL: NORMAL %
PSA FREE SERPL-MCNC: <0.1 NG/ML
PSA SERPL-MCNC: <0.1 NG/ML (ref 0–4)

## 2024-07-18 ENCOUNTER — OFFICE VISIT (OUTPATIENT)
Dept: VASCULAR LAB | Facility: MEDICAL CENTER | Age: 74
End: 2024-07-18
Attending: NURSE PRACTITIONER
Payer: MEDICARE

## 2024-07-18 ENCOUNTER — TELEPHONE (OUTPATIENT)
Dept: VASCULAR LAB | Facility: MEDICAL CENTER | Age: 74
End: 2024-07-18

## 2024-07-18 VITALS
WEIGHT: 169.5 LBS | DIASTOLIC BLOOD PRESSURE: 82 MMHG | HEIGHT: 67 IN | HEART RATE: 62 BPM | BODY MASS INDEX: 26.6 KG/M2 | SYSTOLIC BLOOD PRESSURE: 133 MMHG

## 2024-07-18 DIAGNOSIS — R73.03 PRE-DIABETES: ICD-10-CM

## 2024-07-18 DIAGNOSIS — E78.00 PURE HYPERCHOLESTEROLEMIA: ICD-10-CM

## 2024-07-18 DIAGNOSIS — I10 ESSENTIAL HYPERTENSION: ICD-10-CM

## 2024-07-18 DIAGNOSIS — Z95.2 S/P AVR (AORTIC VALVE REPLACEMENT): ICD-10-CM

## 2024-07-18 DIAGNOSIS — I71.23 ANEURYSM OF DESCENDING THORACIC AORTA WITHOUT RUPTURE (HCC): ICD-10-CM

## 2024-07-18 PROCEDURE — 3079F DIAST BP 80-89 MM HG: CPT | Performed by: NURSE PRACTITIONER

## 2024-07-18 PROCEDURE — 3075F SYST BP GE 130 - 139MM HG: CPT | Performed by: NURSE PRACTITIONER

## 2024-07-18 PROCEDURE — 99212 OFFICE O/P EST SF 10 MIN: CPT

## 2024-07-18 PROCEDURE — 99214 OFFICE O/P EST MOD 30 MIN: CPT | Performed by: NURSE PRACTITIONER

## 2024-07-18 RX ORDER — EZETIMIBE 10 MG/1
10 TABLET ORAL DAILY
Qty: 100 TABLET | Refills: 3 | Status: SHIPPED | OUTPATIENT
Start: 2024-07-18

## 2024-07-18 ASSESSMENT — ENCOUNTER SYMPTOMS
PALPITATIONS: 0
SHORTNESS OF BREATH: 0
CLAUDICATION: 0
FOCAL WEAKNESS: 0
DOUBLE VISION: 0
WEAKNESS: 0
HEADACHES: 0
BRUISES/BLEEDS EASILY: 1
SPEECH CHANGE: 0
BLOOD IN STOOL: 0
MYALGIAS: 0
DIZZINESS: 0
BLURRED VISION: 0

## 2024-07-18 ASSESSMENT — FIBROSIS 4 INDEX: FIB4 SCORE: 1.29

## 2024-07-22 ENCOUNTER — OFFICE VISIT (OUTPATIENT)
Dept: MEDICAL GROUP | Facility: MEDICAL CENTER | Age: 74
End: 2024-07-22
Payer: MEDICARE

## 2024-07-22 VITALS
TEMPERATURE: 98.7 F | HEART RATE: 60 BPM | OXYGEN SATURATION: 99 % | DIASTOLIC BLOOD PRESSURE: 66 MMHG | WEIGHT: 169.75 LBS | BODY MASS INDEX: 26.64 KG/M2 | SYSTOLIC BLOOD PRESSURE: 118 MMHG | HEIGHT: 67 IN

## 2024-07-22 DIAGNOSIS — I71.23 ANEURYSM OF DESCENDING THORACIC AORTA WITHOUT RUPTURE (HCC): ICD-10-CM

## 2024-07-22 DIAGNOSIS — G25.0 BENIGN ESSENTIAL TREMOR: ICD-10-CM

## 2024-07-22 DIAGNOSIS — M25.361 KNEE INSTABILITY, RIGHT: ICD-10-CM

## 2024-07-22 DIAGNOSIS — Z00.00 MEDICARE ANNUAL WELLNESS VISIT, SUBSEQUENT: Primary | ICD-10-CM

## 2024-07-22 DIAGNOSIS — L82.0 KERATOSIS, INFLAMED SEBORRHEIC: ICD-10-CM

## 2024-07-22 DIAGNOSIS — Q25.1 COARCTATION OF AORTA: ICD-10-CM

## 2024-07-22 DIAGNOSIS — E78.00 PURE HYPERCHOLESTEROLEMIA: ICD-10-CM

## 2024-07-22 DIAGNOSIS — I10 ESSENTIAL HYPERTENSION: ICD-10-CM

## 2024-07-22 DIAGNOSIS — F41.8 SITUATIONAL ANXIETY: ICD-10-CM

## 2024-07-22 DIAGNOSIS — Z95.2 S/P AVR (AORTIC VALVE REPLACEMENT): ICD-10-CM

## 2024-07-22 DIAGNOSIS — R73.03 PRE-DIABETES: ICD-10-CM

## 2024-07-22 PROBLEM — Z81.8 FHX: DEMENTIA: Status: ACTIVE | Noted: 2024-07-22

## 2024-07-22 PROCEDURE — G0439 PPPS, SUBSEQ VISIT: HCPCS | Performed by: FAMILY MEDICINE

## 2024-07-22 PROCEDURE — 99214 OFFICE O/P EST MOD 30 MIN: CPT | Mod: 25 | Performed by: FAMILY MEDICINE

## 2024-07-22 RX ORDER — ALPRAZOLAM 0.25 MG/1
0.25 TABLET ORAL NIGHTLY PRN
Qty: 30 TABLET | Refills: 0 | Status: SHIPPED | OUTPATIENT
Start: 2024-07-22 | End: 2024-08-21

## 2024-07-22 RX ORDER — TRIAMCINOLONE ACETONIDE 1 MG/G
OINTMENT TOPICAL
Qty: 30 G | Refills: 2 | Status: SHIPPED | OUTPATIENT
Start: 2024-07-22

## 2024-07-22 ASSESSMENT — FIBROSIS 4 INDEX: FIB4 SCORE: 1.29

## 2024-07-24 PROBLEM — F41.8 SITUATIONAL ANXIETY: Status: ACTIVE | Noted: 2024-07-24

## 2024-07-24 ASSESSMENT — PATIENT HEALTH QUESTIONNAIRE - PHQ9: CLINICAL INTERPRETATION OF PHQ2 SCORE: 0

## 2024-07-24 ASSESSMENT — ACTIVITIES OF DAILY LIVING (ADL): BATHING_REQUIRES_ASSISTANCE: 0

## 2024-07-24 ASSESSMENT — ENCOUNTER SYMPTOMS: GENERAL WELL-BEING: GOOD

## 2024-08-09 ENCOUNTER — TELEPHONE (OUTPATIENT)
Dept: MEDICAL GROUP | Facility: MEDICAL CENTER | Age: 74
End: 2024-08-09

## 2024-08-09 ENCOUNTER — APPOINTMENT (OUTPATIENT)
Dept: MEDICAL GROUP | Facility: MEDICAL CENTER | Age: 74
End: 2024-08-09
Payer: MEDICARE

## 2024-08-09 NOTE — TELEPHONE ENCOUNTER
Left message to schedule an appointment for skin lesion removal, appointment needs to be 40 minutes.

## 2024-08-15 ENCOUNTER — HOSPITAL ENCOUNTER (OUTPATIENT)
Facility: MEDICAL CENTER | Age: 74
End: 2024-08-15
Attending: FAMILY MEDICINE
Payer: MEDICARE

## 2024-08-15 ENCOUNTER — OFFICE VISIT (OUTPATIENT)
Dept: MEDICAL GROUP | Facility: MEDICAL CENTER | Age: 74
End: 2024-08-15
Payer: MEDICARE

## 2024-08-15 VITALS
HEIGHT: 67 IN | WEIGHT: 172.07 LBS | BODY MASS INDEX: 27.01 KG/M2 | OXYGEN SATURATION: 96 % | DIASTOLIC BLOOD PRESSURE: 70 MMHG | HEART RATE: 60 BPM | TEMPERATURE: 97.6 F | SYSTOLIC BLOOD PRESSURE: 122 MMHG

## 2024-08-15 DIAGNOSIS — D48.5 NEOPLASM OF UNCERTAIN BEHAVIOR OF SKIN: ICD-10-CM

## 2024-08-15 DIAGNOSIS — E78.00 PURE HYPERCHOLESTEROLEMIA: ICD-10-CM

## 2024-08-15 DIAGNOSIS — C44.622 SQUAMOUS CELL CARCINOMA, ARM, RIGHT: ICD-10-CM

## 2024-08-15 DIAGNOSIS — I10 ESSENTIAL HYPERTENSION: ICD-10-CM

## 2024-08-15 DIAGNOSIS — S20.211A RIB CONTUSION, RIGHT, INITIAL ENCOUNTER: ICD-10-CM
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
## 2024-08-15 LAB — PATHOLOGY CONSULT NOTE: NORMAL

## 2024-08-15 PROCEDURE — 3078F DIAST BP <80 MM HG: CPT | Performed by: FAMILY MEDICINE

## 2024-08-15 PROCEDURE — 88305 TISSUE EXAM BY PATHOLOGIST: CPT

## 2024-08-15 PROCEDURE — 99214 OFFICE O/P EST MOD 30 MIN: CPT | Mod: 25 | Performed by: FAMILY MEDICINE

## 2024-08-15 PROCEDURE — 11642 EXC F/E/E/N/L MAL+MRG 1.1-2: CPT | Performed by: FAMILY MEDICINE

## 2024-08-15 PROCEDURE — 3074F SYST BP LT 130 MM HG: CPT | Performed by: FAMILY MEDICINE

## 2024-08-15 ASSESSMENT — FIBROSIS 4 INDEX: FIB4 SCORE: 1.29

## 2024-08-15 NOTE — PROGRESS NOTES
Verbal consent was acquired by the patient to use [employer] ambient listening note generation during this visit: YES    CC: abnormal skin lesion     Assessment & Plan:     1. Neoplasm of uncertain behavior of skin  Patient has history of SCC removed from his neck in 2019.   He complains of enlarging, non-tender, hyperpigmented skin lesion on this right forearm. Exam is concerned for SCC.   - excision.   - Consent for all Surgical, Special Diagnostic or Therapeutic Procedures  - Pathology Specimen; Future    2. Essential hypertension  Chronic, controlled with Lisinopril 20 mg qd, Coreg 3.125 mg BID, no s/e reported, will continue.      3. Pure hypercholesterolemia  Chronic, controlled with Lipitor 80 mg qd and Zetia 10 mg qd, no s/e reported, will continue.      4. Rib contusion, right, initial encounter  5. Ground-level fall  History and exam are concerning for right-sided rib contusion from recent ground level fall during hiking. No respiratory symptoms. Negative hematoma on the chest wall.   - conservative management recommended and discussed.   - fall prevention discussed.   - OTC Tylenol as needed for pain.     Procedure note: EXCISION OF skin cancer, SCC.   Indication: neoplasm of the skin   Performing physician: Ekaterina Love M.D.   Assistant: Carmen Granados   Location: right forearm.  Risks, benefits, potential complications, and alternative options discussed with patient. Patient consented to the procedure.   Local anesthesia is achieved with 1% Lidocaine w/o EPI.   The lesion was identified, marked, and cleansed thoroughly with Povidone-Iodine sticks x3. The surgical site was prepared and draped in the usual sterile manner. 3 cm elliptical skin incision was made in a linear fashion. The lesion was dissected and excised using #15 blade.  Hemostasis was achieved with direct pressure. The wound was closed by  interrupted sutures. Patient tolerates the procedure well. No immediate complications noted.    EBL:  minimal  Surgical specimen sent to pathology.   Follow-up: Standard post-procedure care is explained and return precautions are given. Patient will return to the clinic in 10 days for suture removal.     Subjective:       HPI:   History of Present Illness    Patient complains of abnormal skin lesion on the right forearm. The lesion is enlarging and darkened with time. He has history of SCC in the past and is concerned. He endorses regular sun exposure. He tries to wear protective clothing and sunscreen.     He also has chronic hypertension, hyperlipidemia. Both are controlled with medications. No side effects reported.     Patient tripped and fell to the ground while hiking a few weeks ago. He has mild abrasion on the right knee. His right chest wall is also tender to palpation. Pain appears to be worse with deep breathing, coughing, sneezing. Negative respiratory distress. He is able to maintain daily activities. He has been taking Tylenol as needed for pain.       Current Outpatient Medications:     triamcinolone acetonide (KENALOG) 0.1 % Ointment, Apply a thin layer two times per day until symptoms resolve., Disp: 30 g, Rfl: 2    ALPRAZolam (XANAX) 0.25 MG Tab, Take 1 Tablet by mouth at bedtime as needed for Anxiety for up to 30 days., Disp: 30 Tablet, Rfl: 0    ezetimibe (ZETIA) 10 MG Tab, Take 1 Tablet by mouth every day., Disp: 100 Tablet, Rfl: 3    primidone (MYSOLINE) 50 MG Tab, TAKE 1 TABLET BY MOUTH AT BEDTIME, Disp: 90 Tablet, Rfl: 3    carvedilol (COREG) 3.125 MG Tab, Take 1 tablet by mouth twice daily, Disp: 200 Tablet, Rfl: 3    atorvastatin (LIPITOR) 80 MG tablet, TAKE 1 TABLET BY MOUTH ONCE DAILY IN THE EVENING, Disp: 100 Tablet, Rfl: 3    tadalafil (CIALIS) 20 MG tablet, Take 20 mg by mouth as needed for Erectile Dysfunction., Disp: , Rfl:     lisinopril (PRINIVIL) 20 MG Tab, Take 1 tablet by mouth once daily, Disp: 100 Tablet, Rfl: 3    coenzyme Q-10 30 MG capsule, Take 60 mg by mouth every day.,  "Disp: , Rfl:     vitamin E 180 MG (400 UNIT) Cap, Take 180 mg by mouth every day., Disp: , Rfl:     Omega-3 Fatty Acids (FISH OIL) 1000 MG Cap capsule, Take 1,000 mg by mouth every day., Disp: , Rfl:     aspirin (ASA) 81 MG Chew Tab chewable tablet, Take 1 Tab by mouth every day., Disp: 100 Tab, Rfl: 11    therapeutic multivitamin-minerals (THERAGRAN-M) TABS, Take 1 Tab by mouth every day., Disp: , Rfl:      Objective:     Exam:  /70 (BP Location: Right arm, Patient Position: Sitting, BP Cuff Size: Adult) Comment: taken over long sleeve t shirt  Pulse 60   Temp 36.4 °C (97.6 °F) (Temporal)   Ht 1.702 m (5' 7\")   Wt 78 kg (172 lb 1.1 oz)   SpO2 96%   BMI 26.95 kg/m²  Body mass index is 26.95 kg/m².    Constitutional: awake, alert, in no distress.  Skin: Warm, dry, good turgor, no rashes, bruises, ulcers in visible areas.  - mild healing abrasion on the right knee  - 1.2 cm, hyperpigmented, non-tender plaque noted at the extensor surface of the right forearm.   Eye: conjunctiva clear, lids neg for edema or lesions.  Psych: Oriented x3, affect and mood wnl, intact judgement and insight.   MSK: negative hematoma on the chest wall or e/o chest wall deformity. Mild-moderate tenderness to palpation of the soft tissue inferior to the right nipple. Pain is worse with deep inhalation.     Return in about 2 weeks (around 8/29/2024) for Sutures removal.          Please note that this dictation was created using voice recognition software. I have made every reasonable attempt to correct obvious errors, but I expect that there are errors of grammar and possibly content that I did not discover before finalizing the note.        "

## 2024-08-23 ENCOUNTER — APPOINTMENT (OUTPATIENT)
Dept: MEDICAL GROUP | Facility: MEDICAL CENTER | Age: 74
End: 2024-08-23
Payer: MEDICARE

## 2024-08-23 NOTE — PROGRESS NOTES
Addendum:  Pathology report consistent with invasive squamous cell carcinoma.  Ekaterina Love M.D.

## 2024-08-26 ENCOUNTER — PATIENT MESSAGE (OUTPATIENT)
Dept: HEALTH INFORMATION MANAGEMENT | Facility: OTHER | Age: 74
End: 2024-08-26

## 2024-08-28 ENCOUNTER — HOSPITAL ENCOUNTER (OUTPATIENT)
Dept: CARDIOLOGY | Facility: MEDICAL CENTER | Age: 74
End: 2024-08-28
Attending: NURSE PRACTITIONER
Payer: MEDICARE

## 2024-08-28 ENCOUNTER — DOCUMENTATION (OUTPATIENT)
Dept: VASCULAR LAB | Facility: MEDICAL CENTER | Age: 74
End: 2024-08-28

## 2024-08-28 DIAGNOSIS — Z95.2 S/P AVR (AORTIC VALVE REPLACEMENT): ICD-10-CM

## 2024-08-28 LAB
LV EJECT FRACT  99904: 66
LV EJECT FRACT MOD 2C 99903: 65.16
LV EJECT FRACT MOD 4C 99902: 61.49
LV EJECT FRACT MOD BP 99901: 62.67

## 2024-08-28 PROCEDURE — 93306 TTE W/DOPPLER COMPLETE: CPT

## 2024-08-29 ENCOUNTER — OFFICE VISIT (OUTPATIENT)
Dept: MEDICAL GROUP | Facility: MEDICAL CENTER | Age: 74
End: 2024-08-29
Payer: MEDICARE

## 2024-08-29 VITALS
SYSTOLIC BLOOD PRESSURE: 120 MMHG | DIASTOLIC BLOOD PRESSURE: 72 MMHG | HEIGHT: 67 IN | TEMPERATURE: 97.8 F | HEART RATE: 60 BPM | WEIGHT: 168.32 LBS | OXYGEN SATURATION: 97 % | BODY MASS INDEX: 26.42 KG/M2

## 2024-08-29 DIAGNOSIS — I10 ESSENTIAL HYPERTENSION: ICD-10-CM

## 2024-08-29 DIAGNOSIS — E78.00 PURE HYPERCHOLESTEROLEMIA: ICD-10-CM

## 2024-08-29 DIAGNOSIS — I71.21 ANEURYSM OF ASCENDING AORTA WITHOUT RUPTURE (HCC): ICD-10-CM

## 2024-08-29 DIAGNOSIS — Z48.02 VISIT FOR SUTURE REMOVAL: ICD-10-CM

## 2024-08-29 DIAGNOSIS — C44.622 SCC (SQUAMOUS CELL CARCINOMA), ARM, RIGHT: ICD-10-CM

## 2024-08-29 DIAGNOSIS — R73.03 PRE-DIABETES: ICD-10-CM

## 2024-08-29 DIAGNOSIS — Q25.1 COARCTATION OF AORTA: ICD-10-CM

## 2024-08-29 ASSESSMENT — FIBROSIS 4 INDEX: FIB4 SCORE: 1.29

## 2024-08-29 NOTE — PROGRESS NOTES
Verbal consent was acquired by the patient to use [employer] ambient listening note generation during this visit: YES    CC: SCC, ascending aorta aneurysm    Assessment & Plan:     1. Essential hypertension  Chronic, controlled with Lisinopril 20 mg and Coreg 3.125 mg BID , no s/e reported, will continue.      2. Pre-diabetes  Recent A1C was 5.7.   - Dietary/lifestyle modification and weight loss      3. Pure hypercholesterolemia  Chronic, taking Zetia 10 mg and Lipitor 80 mg managed by vascular medicine. No side effects reported, will continue to monitor.     4. Coarctation of aorta, CTA and ECHO every 2 years, f/u vascular medicine (Dr. Bloch)  7. Aneurysm of ascending aorta without rupture (HCC)  Echo done yesterday showed ascending aorta of 3.2 cm.   Prior echo done last year showed ascending aorta of 4.2 cm.     5. SCC (squamous cell carcinoma), arm, right  6. Visit for suture removal  Patient had skin lesion removed from right forearm 2 weeks ago.   Pathology was consistent with SCC, margin was cleared.   Patient has prior SCC on the neck.   Sun protection discussed.   Continue to monitor for abnormal changes of the skin and follow up as needed.   Sutures removed during office visit.        Subjective:       HPI:   History of Present Illness      Patient is doing well.  He takes all medication as directed.  He tolerates them well, no side effect reported.  He recently had an echocardiogram done to monitor for ascending aneurysm which was measured at 3.2 cm per most recent echocardiogram.  Patient continues to take all his medication as directed.  He is active and exercises regularly.    He had a large SCC removed from his right arm 2 weeks ago.  Patient adhere strictly to sun protection protocols due to prior history of SCC.  The wound is healing well.  Denies any pain or abnormal discharge.      Current Outpatient Medications:     triamcinolone acetonide (KENALOG) 0.1 % Ointment, Apply a thin layer two times  "per day until symptoms resolve., Disp: 30 g, Rfl: 2    ezetimibe (ZETIA) 10 MG Tab, Take 1 Tablet by mouth every day., Disp: 100 Tablet, Rfl: 3    primidone (MYSOLINE) 50 MG Tab, TAKE 1 TABLET BY MOUTH AT BEDTIME, Disp: 90 Tablet, Rfl: 3    carvedilol (COREG) 3.125 MG Tab, Take 1 tablet by mouth twice daily, Disp: 200 Tablet, Rfl: 3    atorvastatin (LIPITOR) 80 MG tablet, TAKE 1 TABLET BY MOUTH ONCE DAILY IN THE EVENING, Disp: 100 Tablet, Rfl: 3    tadalafil (CIALIS) 20 MG tablet, Take 20 mg by mouth as needed for Erectile Dysfunction., Disp: , Rfl:     lisinopril (PRINIVIL) 20 MG Tab, Take 1 tablet by mouth once daily, Disp: 100 Tablet, Rfl: 3    coenzyme Q-10 30 MG capsule, Take 60 mg by mouth every day., Disp: , Rfl:     vitamin E 180 MG (400 UNIT) Cap, Take 180 mg by mouth every day., Disp: , Rfl:     Omega-3 Fatty Acids (FISH OIL) 1000 MG Cap capsule, Take 1,000 mg by mouth every day., Disp: , Rfl:     aspirin (ASA) 81 MG Chew Tab chewable tablet, Take 1 Tab by mouth every day., Disp: 100 Tab, Rfl: 11    therapeutic multivitamin-minerals (THERAGRAN-M) TABS, Take 1 Tab by mouth every day., Disp: , Rfl:      Objective:     Exam:  /72 (BP Location: Left arm, Patient Position: Sitting, BP Cuff Size: Adult)   Pulse 60   Temp 36.6 °C (97.8 °F) (Temporal)   Ht 1.702 m (5' 7\")   Wt 76.4 kg (168 lb 5.1 oz)   SpO2 97%   BMI 26.36 kg/m²  Body mass index is 26.36 kg/m².    Constitutional: awake, alert, in no distress.  Skin: Warm, dry, good turgor, no rashes, bruises, ulcers in visible areas.  -Surgical site at the right forearm appeared to heal well.  Negative bleeding or discharge.  Minimal surrounding erythema and edema noted.  Sutures intact.  Eye: conjunctiva clear, lids neg for edema or lesions.  Neck: Trachea midline, no masses, no thyromegaly. No cervical or supraclavicular lymphadenopathy  Respiratory: Unlabored respiratory effort, lungs clear to auscultation, no wheezes, no rales.  Cardiovascular: " Normal S1, S2, no murmur, no pedal edema.  Psych: Oriented x3, affect and mood wnl, intact judgement and insight.         Return in about 6 months (around 2/28/2025) for Multiple issues.          Please note that this dictation was created using voice recognition software. I have made every reasonable attempt to correct obvious errors, but I expect that there are errors of grammar and possibly content that I did not discover before finalizing the note.

## 2024-09-24 ENCOUNTER — HOSPITAL ENCOUNTER (OUTPATIENT)
Dept: LAB | Facility: MEDICAL CENTER | Age: 74
End: 2024-09-24
Attending: PHYSICIAN ASSISTANT
Payer: MEDICARE

## 2024-09-24 LAB — PSA SERPL-MCNC: <0.02 NG/ML (ref 0–4)

## 2024-09-24 PROCEDURE — 84153 ASSAY OF PSA TOTAL: CPT

## 2024-09-24 PROCEDURE — 36415 COLL VENOUS BLD VENIPUNCTURE: CPT

## 2024-10-15 ENCOUNTER — HOSPITAL ENCOUNTER (OUTPATIENT)
Dept: LAB | Facility: MEDICAL CENTER | Age: 74
End: 2024-10-15
Attending: NURSE PRACTITIONER
Payer: MEDICARE

## 2024-10-15 DIAGNOSIS — I10 ESSENTIAL HYPERTENSION: ICD-10-CM

## 2024-10-15 DIAGNOSIS — E78.00 PURE HYPERCHOLESTEROLEMIA: ICD-10-CM

## 2024-10-15 LAB
CHOLEST SERPL-MCNC: 124 MG/DL (ref 100–199)
FASTING STATUS PATIENT QL REPORTED: NORMAL
HDLC SERPL-MCNC: 41 MG/DL
LDLC SERPL CALC-MCNC: 71 MG/DL
TRIGL SERPL-MCNC: 58 MG/DL (ref 0–149)

## 2024-10-15 PROCEDURE — 80061 LIPID PANEL: CPT

## 2024-10-15 PROCEDURE — 36415 COLL VENOUS BLD VENIPUNCTURE: CPT

## 2024-10-15 PROCEDURE — 83695 ASSAY OF LIPOPROTEIN(A): CPT

## 2024-10-17 LAB — LPA SERPL-MCNC: 220 MG/DL

## 2024-10-30 ENCOUNTER — OFFICE VISIT (OUTPATIENT)
Dept: VASCULAR LAB | Facility: MEDICAL CENTER | Age: 74
End: 2024-10-30
Attending: NURSE PRACTITIONER
Payer: MEDICARE

## 2024-10-30 VITALS
DIASTOLIC BLOOD PRESSURE: 80 MMHG | HEIGHT: 67 IN | WEIGHT: 170 LBS | HEART RATE: 56 BPM | BODY MASS INDEX: 26.68 KG/M2 | SYSTOLIC BLOOD PRESSURE: 152 MMHG

## 2024-10-30 DIAGNOSIS — I10 ESSENTIAL HYPERTENSION: ICD-10-CM

## 2024-10-30 DIAGNOSIS — R73.03 PRE-DIABETES: ICD-10-CM

## 2024-10-30 DIAGNOSIS — E78.41 ELEVATED LIPOPROTEIN(A): ICD-10-CM

## 2024-10-30 DIAGNOSIS — E78.00 PURE HYPERCHOLESTEROLEMIA: ICD-10-CM

## 2024-10-30 DIAGNOSIS — I71.21 ANEURYSM OF ASCENDING AORTA WITHOUT RUPTURE (HCC): ICD-10-CM

## 2024-10-30 DIAGNOSIS — Z95.2 S/P AVR (AORTIC VALVE REPLACEMENT): ICD-10-CM

## 2024-10-30 PROCEDURE — 99212 OFFICE O/P EST SF 10 MIN: CPT

## 2024-10-30 ASSESSMENT — ENCOUNTER SYMPTOMS
DOUBLE VISION: 0
BLOOD IN STOOL: 0
SHORTNESS OF BREATH: 0
WEAKNESS: 0
PALPITATIONS: 0
MYALGIAS: 0
DIZZINESS: 0
SPEECH CHANGE: 0
CLAUDICATION: 0
BRUISES/BLEEDS EASILY: 1
HEADACHES: 0
FOCAL WEAKNESS: 0
BLURRED VISION: 0

## 2024-10-30 ASSESSMENT — FIBROSIS 4 INDEX: FIB4 SCORE: 1.29

## 2025-01-01 DIAGNOSIS — I10 ESSENTIAL HYPERTENSION: ICD-10-CM

## 2025-01-02 RX ORDER — LISINOPRIL 20 MG/1
20 TABLET ORAL DAILY
Qty: 100 TABLET | Refills: 3 | Status: SHIPPED | OUTPATIENT
Start: 2025-01-02

## 2025-01-28 ENCOUNTER — HOSPITAL ENCOUNTER (OUTPATIENT)
Dept: LAB | Facility: MEDICAL CENTER | Age: 75
End: 2025-01-28
Attending: FAMILY MEDICINE
Payer: MEDICARE

## 2025-01-28 DIAGNOSIS — R73.03 PRE-DIABETES: ICD-10-CM

## 2025-01-28 DIAGNOSIS — E78.00 PURE HYPERCHOLESTEROLEMIA: ICD-10-CM

## 2025-01-28 LAB
ALBUMIN SERPL BCP-MCNC: 4.1 G/DL (ref 3.2–4.9)
ALBUMIN/GLOB SERPL: 1.8 G/DL
ALP SERPL-CCNC: 92 U/L (ref 30–99)
ALT SERPL-CCNC: 35 U/L (ref 2–50)
ANION GAP SERPL CALC-SCNC: 10 MMOL/L (ref 7–16)
AST SERPL-CCNC: 25 U/L (ref 12–45)
BILIRUB SERPL-MCNC: 0.4 MG/DL (ref 0.1–1.5)
BUN SERPL-MCNC: 13 MG/DL (ref 8–22)
CALCIUM ALBUM COR SERPL-MCNC: 8.9 MG/DL (ref 8.5–10.5)
CALCIUM SERPL-MCNC: 9 MG/DL (ref 8.5–10.5)
CHLORIDE SERPL-SCNC: 105 MMOL/L (ref 96–112)
CHOLEST SERPL-MCNC: 127 MG/DL (ref 100–199)
CO2 SERPL-SCNC: 24 MMOL/L (ref 20–33)
CREAT SERPL-MCNC: 1.07 MG/DL (ref 0.5–1.4)
EST. AVERAGE GLUCOSE BLD GHB EST-MCNC: 120 MG/DL
FASTING STATUS PATIENT QL REPORTED: NORMAL
GFR SERPLBLD CREATININE-BSD FMLA CKD-EPI: 73 ML/MIN/1.73 M 2
GLOBULIN SER CALC-MCNC: 2.3 G/DL (ref 1.9–3.5)
GLUCOSE SERPL-MCNC: 101 MG/DL (ref 65–99)
HBA1C MFR BLD: 5.8 % (ref 4–5.6)
HDLC SERPL-MCNC: 38 MG/DL
LDLC SERPL CALC-MCNC: 78 MG/DL
POTASSIUM SERPL-SCNC: 4.6 MMOL/L (ref 3.6–5.5)
PROT SERPL-MCNC: 6.4 G/DL (ref 6–8.2)
SODIUM SERPL-SCNC: 139 MMOL/L (ref 135–145)
TRIGL SERPL-MCNC: 55 MG/DL (ref 0–149)

## 2025-01-28 PROCEDURE — 83036 HEMOGLOBIN GLYCOSYLATED A1C: CPT

## 2025-01-28 PROCEDURE — 80053 COMPREHEN METABOLIC PANEL: CPT

## 2025-01-28 PROCEDURE — 36415 COLL VENOUS BLD VENIPUNCTURE: CPT

## 2025-01-28 PROCEDURE — 80061 LIPID PANEL: CPT

## 2025-01-30 ENCOUNTER — OFFICE VISIT (OUTPATIENT)
Dept: MEDICAL GROUP | Facility: MEDICAL CENTER | Age: 75
End: 2025-01-30
Payer: MEDICARE

## 2025-01-30 VITALS
HEART RATE: 76 BPM | OXYGEN SATURATION: 96 % | SYSTOLIC BLOOD PRESSURE: 124 MMHG | HEIGHT: 67 IN | TEMPERATURE: 97.7 F | BODY MASS INDEX: 26.64 KG/M2 | DIASTOLIC BLOOD PRESSURE: 70 MMHG | WEIGHT: 169.75 LBS

## 2025-01-30 DIAGNOSIS — Z12.12 SCREENING FOR COLORECTAL CANCER: ICD-10-CM

## 2025-01-30 DIAGNOSIS — B35.1 ONYCHOMYCOSIS: ICD-10-CM

## 2025-01-30 DIAGNOSIS — Z63.8 STRESS DUE TO FAMILY TENSION: ICD-10-CM

## 2025-01-30 DIAGNOSIS — L60.3 DYSTROPHIC NAIL: ICD-10-CM

## 2025-01-30 DIAGNOSIS — Z12.11 SCREENING FOR COLORECTAL CANCER: ICD-10-CM

## 2025-01-30 DIAGNOSIS — E78.00 PURE HYPERCHOLESTEROLEMIA: ICD-10-CM

## 2025-01-30 DIAGNOSIS — R73.03 PRE-DIABETES: ICD-10-CM

## 2025-01-30 DIAGNOSIS — Z85.46 PERSONAL HISTORY OF MALIGNANT NEOPLASM OF PROSTATE: ICD-10-CM

## 2025-01-30 DIAGNOSIS — I10 ESSENTIAL HYPERTENSION: ICD-10-CM

## 2025-01-30 DIAGNOSIS — Z00.00 PE (PHYSICAL EXAM), ANNUAL: Primary | ICD-10-CM

## 2025-01-30 DIAGNOSIS — F41.8 SITUATIONAL ANXIETY: ICD-10-CM

## 2025-01-30 DIAGNOSIS — G25.0 BENIGN ESSENTIAL TREMOR: ICD-10-CM

## 2025-01-30 RX ORDER — CICLOPIROX 80 MG/ML
SOLUTION TOPICAL
Qty: 6 ML | Refills: 2 | Status: SHIPPED | OUTPATIENT
Start: 2025-01-30

## 2025-01-30 RX ORDER — ALPRAZOLAM 0.25 MG/1
0.25 TABLET ORAL NIGHTLY PRN
Qty: 30 TABLET | Refills: 0 | Status: SHIPPED | OUTPATIENT
Start: 2025-01-30 | End: 2025-03-01

## 2025-01-30 SDOH — SOCIAL STABILITY - SOCIAL INSECURITY: OTHER SPECIFIED PROBLEMS RELATED TO PRIMARY SUPPORT GROUP: Z63.8

## 2025-01-30 ASSESSMENT — FIBROSIS 4 INDEX: FIB4 SCORE: 1.17

## 2025-01-30 ASSESSMENT — PATIENT HEALTH QUESTIONNAIRE - PHQ9: CLINICAL INTERPRETATION OF PHQ2 SCORE: 0

## 2025-01-31 NOTE — PROGRESS NOTES
Verbal consent was acquired by the patient to use [employer] ambient listening note generation during this visit: YES    CC: aPE, nail concerns, anxiety     Assessment & Plan:     1. Essential hypertension  Chronic, controlled with Lisinopril 20 mg qd, Coreg 3.125 mg BID, no s/e reported, will continue.    - CBC WITH DIFFERENTIAL; Future  - Comp Metabolic Panel; Future  - MICROALBUMIN CREAT RATIO URINE; Future    2. Pre-diabetes  Lab Results   Component Value Date/Time    HBA1C 5.8 (H) 01/28/2025 06:26 AM   - Dietary/lifestyle modification and weight loss    - HEMOGLOBIN A1C; Future    3. Pure hypercholesterolemia  Chronic, controlled with Zetia 10 mg qd and Lipitor 80 mg daily managed by Vascular medicine, no s/e reported, will continue.    - Lipid Profile; Future    4. Benign essential tremor  Chronic, has not been taking Primidone, complains of relapse of symptoms.   - restart Primidone 50 mg nightly     5. Personal history of malignant neoplasm of [facility]   - PSA TOTAL + %FREE; Future    6. Screening for colorectal cancer  - Cologuard® colon cancer screening    7. PE (physical exam), annual (Primary)  Labs per orders  Immunization reviewed and discussed.  Patient was counseled about  diet, supplements, exercises.   Preventive cares reviewed, discussed, and updated as appropriate.       8. Onychomycosis  9. Dystrophic nail  History and exam are concerning for onychomycosis and dystrophic nail at the great toes bilaterally.   - nail trimming performed by myself during office visit   - ciclopirox (PENLAC) 8 % solution; Apply to affected fingernail(s) and/or toenail(s) and adjacent skin once daily in combination with weekly nail trimming and periodic nail debridement. Remove with alcohol every 7 days; continue therapy until nail clearance (maximum duration: 48 weeks)  Dispense: 6 mL; Refill: 2    10. Situational anxiety  11. Stress due to family tension   Chronic, continues to experience  situational anxiety from increased social stress.  However, he wishes to have prescription for Xanax 0.25 mg as needed for anxiety.  Patient tolerated the past  - ALPRAZolam (XANAX) 0.25 MG Tab; Take 1 Tablet by mouth at bedtime as needed for Anxiety for up to 30 days.  Dispense: 30 Tablet; Refill: 0  - Risks, benefits, side effects, as well as potential health complications associated with Xanax were previously discussed with patient. Appropriate counseling provided.       Nail trimming, procedure note:   I personally trimmed and removed dystrophic, overgrown nails from all his toes during office visit using a nail clipper. Patient tolerates the procedure well. No immediate complication noted.         Subjective:       HPI:     History of Present Illness  The patient presents for evaluation of onychomycosis.    Patient is well.  He is taking all medications as directed, no side effect reported.  However, he has not been taking Primidone for essential tremors.  He complains of relapse of symptoms. He wishes to restart.       Onychomycosis  - Previously treated for fungal nail infection 2 years ago, he responded well to treatment.  However, he complains of persistent discoloration and abnormal looking nails at the toes bilaterally.  -He suspects recurrence.  - Reports no trauma.  Denies any pain to the affected toes.  - the nail plate on the left great toe splits longitudinally, one part healthy, other necrotic.  - New nail growth can be observed at the base of the nail plates bilaterally.  -It is difficult for him to trim his nail due to body habitus      - he continues to take cholesterol and blood pressure medications. Blood sugar levels slightly elevated, attributed to wife's cooking habits with frequent sweet dishes. Requested annual PSA test as recommended by urologist. No stool test done.     He is active but has not exercised regularly due to winter weather. He complains of increased social stress with his  family. He has mild anxiety and wishes to takes Xanax 0.25 mg daily PRN. He declined  referral or SSRI/SNRI.             Current Outpatient Medications:     ciclopirox (PENLAC) 8 % solution, Apply to affected fingernail(s) and/or toenail(s) and adjacent skin once daily in combination with weekly nail trimming and periodic nail debridement. Remove with alcohol every 7 days; continue therapy until nail clearance (maximum duration: 48 weeks), Disp: 6 mL, Rfl: 2    ALPRAZolam (XANAX) 0.25 MG Tab, Take 1 Tablet by mouth at bedtime as needed for Anxiety for up to 30 days., Disp: 30 Tablet, Rfl: 0    lisinopril (PRINIVIL) 20 MG Tab, Take 1 tablet by mouth once daily, Disp: 100 Tablet, Rfl: 3    triamcinolone acetonide (KENALOG) 0.1 % Ointment, Apply a thin layer two times per day until symptoms resolve., Disp: 30 g, Rfl: 2    ezetimibe (ZETIA) 10 MG Tab, Take 1 Tablet by mouth every day., Disp: 100 Tablet, Rfl: 3    primidone (MYSOLINE) 50 MG Tab, TAKE 1 TABLET BY MOUTH AT BEDTIME, Disp: 90 Tablet, Rfl: 3    carvedilol (COREG) 3.125 MG Tab, Take 1 tablet by mouth twice daily, Disp: 200 Tablet, Rfl: 3    atorvastatin (LIPITOR) 80 MG tablet, TAKE 1 TABLET BY MOUTH ONCE DAILY IN THE EVENING, Disp: 100 Tablet, Rfl: 3    tadalafil (CIALIS) 20 MG tablet, Take 20 mg by mouth as needed for Erectile Dysfunction., Disp: , Rfl:     coenzyme Q-10 30 MG capsule, Take 60 mg by mouth every day., Disp: , Rfl:     vitamin E 180 MG (400 UNIT) Cap, Take 180 mg by mouth every day., Disp: , Rfl:     Omega-3 Fatty Acids (FISH OIL) 1000 MG Cap capsule, Take 1,000 mg by mouth every day., Disp: , Rfl:     aspirin (ASA) 81 MG Chew Tab chewable tablet, Take 1 Tab by mouth every day., Disp: 100 Tab, Rfl: 11    therapeutic multivitamin-minerals (THERAGRAN-M) TABS, Take 1 Tab by mouth every day., Disp: , Rfl:      Objective:     Exam:  /70 (BP Location: Left arm, Patient Position: Sitting, BP Cuff Size: Adult long)   Pulse 76   Temp 36.5 °C  "(97.7 °F) (Temporal)   Ht 1.702 m (5' 7\")   Wt 77 kg (169 lb 12.1 oz)   SpO2 96%   BMI 26.59 kg/m²  Body mass index is 26.59 kg/m².    Constitutional: awake, alert, in no distress.  Skin: Warm, dry, good turgor, no rashes, bruises, ulcers in visible areas.  Eye: conjunctiva clear, lids neg for edema or lesions.  Respiratory: Unlabored respiratory effort, lungs clear to auscultation, no wheezes, no rales.  Cardiovascular: Normal S1, S2, no murmur, no pedal edema.  Psych: Oriented x3, affect and mood wnl, intact judgement and insight.   Foot exam: nails at the right/left great toes are dry, raised, brittle, discolored, abnormally-shaped. The rest of bilateral foot exam within normal limits.         Return in about 6 months (around 7/30/2025) for Multiple issues.          Please note that this dictation was created using voice recognition software. I have made every reasonable attempt to correct obvious errors, but I expect that there are errors of grammar and possibly content that I did not discover before finalizing the note.        "

## 2025-02-18 LAB — NONINV COLON CA DNA+OCC BLD SCRN STL QL: NEGATIVE

## 2025-02-20 ENCOUNTER — RESULTS FOLLOW-UP (OUTPATIENT)
Dept: MEDICAL GROUP | Facility: MEDICAL CENTER | Age: 75
End: 2025-02-20
Payer: MEDICARE

## 2025-03-01 ENCOUNTER — OFFICE VISIT (OUTPATIENT)
Dept: URGENT CARE | Facility: CLINIC | Age: 75
End: 2025-03-01
Payer: MEDICARE

## 2025-03-01 ENCOUNTER — APPOINTMENT (OUTPATIENT)
Dept: RADIOLOGY | Facility: IMAGING CENTER | Age: 75
End: 2025-03-01
Payer: MEDICARE

## 2025-03-01 VITALS
HEART RATE: 61 BPM | HEIGHT: 67 IN | OXYGEN SATURATION: 98 % | DIASTOLIC BLOOD PRESSURE: 70 MMHG | SYSTOLIC BLOOD PRESSURE: 134 MMHG | WEIGHT: 174.2 LBS | BODY MASS INDEX: 27.34 KG/M2 | RESPIRATION RATE: 15 BRPM | TEMPERATURE: 98.1 F

## 2025-03-01 DIAGNOSIS — S40.021A CONTUSION OF MULTIPLE SITES OF RIGHT UPPER EXTREMITY, INITIAL ENCOUNTER: ICD-10-CM

## 2025-03-01 DIAGNOSIS — S59.901A INJURY OF RIGHT ELBOW, INITIAL ENCOUNTER: ICD-10-CM

## 2025-03-01 DIAGNOSIS — M70.21 OLECRANON BURSITIS OF RIGHT ELBOW: ICD-10-CM

## 2025-03-01 PROCEDURE — 3078F DIAST BP <80 MM HG: CPT

## 2025-03-01 PROCEDURE — 99213 OFFICE O/P EST LOW 20 MIN: CPT

## 2025-03-01 PROCEDURE — 3075F SYST BP GE 130 - 139MM HG: CPT

## 2025-03-01 PROCEDURE — 73080 X-RAY EXAM OF ELBOW: CPT | Mod: TC,RT | Performed by: RADIOLOGY

## 2025-03-01 ASSESSMENT — FIBROSIS 4 INDEX: FIB4 SCORE: 1.17

## 2025-03-01 NOTE — PROGRESS NOTES
Subjective:   Juan Berumen is a 74 y.o. male who presents for Fall (Fell three days ago on concrete landing on his right arm, bruising )      HPI:    Patient presents urgent care with concerns of right elbow pain.  States he was working on his car 3 days ago and while pulling on a casket he states he stumbled backwards and fell and landed on the back of his right elbow.  He has had traumatic bruising and swelling since the accident.  Denies pain, reduced range of motion.  Has taken Tylenol once or twice for discomfort at night.  Denies history of traumatic right arm injuries.  Denies weakness, numbness/tingling sensation.  Patient states he takes baby aspirin for anticoagulation.  Has history of open heart surgery, aortic valve replacement.    ROS As above in HPI    Medications:    Current Outpatient Medications on File Prior to Visit   Medication Sig Dispense Refill    ciclopirox (PENLAC) 8 % solution Apply to affected fingernail(s) and/or toenail(s) and adjacent skin once daily in combination with weekly nail trimming and periodic nail debridement. Remove with alcohol every 7 days; continue therapy until nail clearance (maximum duration: 48 weeks) 6 mL 2    ALPRAZolam (XANAX) 0.25 MG Tab Take 1 Tablet by mouth at bedtime as needed for Anxiety for up to 30 days. 30 Tablet 0    lisinopril (PRINIVIL) 20 MG Tab Take 1 tablet by mouth once daily 100 Tablet 3    triamcinolone acetonide (KENALOG) 0.1 % Ointment Apply a thin layer two times per day until symptoms resolve. 30 g 2    ezetimibe (ZETIA) 10 MG Tab Take 1 Tablet by mouth every day. 100 Tablet 3    primidone (MYSOLINE) 50 MG Tab TAKE 1 TABLET BY MOUTH AT BEDTIME 90 Tablet 3    carvedilol (COREG) 3.125 MG Tab Take 1 tablet by mouth twice daily 200 Tablet 3    atorvastatin (LIPITOR) 80 MG tablet TAKE 1 TABLET BY MOUTH ONCE DAILY IN THE EVENING 100 Tablet 3    tadalafil (CIALIS) 20 MG tablet Take 20 mg by mouth as needed for Erectile Dysfunction.       coenzyme Q-10 30 MG capsule Take 60 mg by mouth every day.      vitamin E 180 MG (400 UNIT) Cap Take 180 mg by mouth every day.      Omega-3 Fatty Acids (FISH OIL) 1000 MG Cap capsule Take 1,000 mg by mouth every day.      aspirin (ASA) 81 MG Chew Tab chewable tablet Take 1 Tab by mouth every day. 100 Tab 11    therapeutic multivitamin-minerals (THERAGRAN-M) TABS Take 1 Tab by mouth every day.       No current facility-administered medications on file prior to visit.        Allergies:   Zetia [ezetimibe]    Problem List:   Patient Active Problem List   Diagnosis    Pure hypercholesterolemia    Essential hypertension    Coarctation of aorta, CTA and ECHO every 2 years, f/u vascular medicine (Dr. Bloch)    S/P AVR (aortic valve replacement), bioprosthetic,     Pre-diabetes    Benign essential tremor    Personal history of malignant neoplasm of [facility]     History of cardiac cath, , no CAD    History of SCC    Thoracic aortic aneurysm without rupture (HCC)    FHx: dementia    Situational anxiety        Surgical History:  Past Surgical History:   Procedure Laterality Date    AORTIC VALVE REPLACEMENT  2016    Procedure: AORTIC VALVE REPLACEMENT WITH JENNIFER;  Surgeon: Guero Bradford M.D.;  Location: SURGERY [facility];  Service:     HERNIA REPAIR      Umbilical    OTHER      prostate surgery       Past Social Hx:   Social History     Tobacco Use    Smoking status: Former     Current packs/day: 0.00     Average packs/day: 0.5 packs/day for 15.0 years (7.5 ttl pk-yrs)     Types: Cigarettes     Start date: 1993     Quit date: 2008     Years since quittin.8    Smokeless tobacco: Never   Vaping Use    Vaping status: Every Day    Substances: Nicotine   Substance Use Topics    Alcohol use: Yes     Alcohol/week: 0.0 oz    Drug use: Yes     Types: Marijuana, Oral     Comment: gummies          Problem list, medications, and allergies reviewed by myself today in Epic.     Objective:  "    /70 (BP Location: Left arm, Patient Position: Sitting, BP Cuff Size: Adult)   Pulse 61   Temp 36.7 °C (98.1 °F) (Temporal)   Resp 15   Ht 1.702 m (5' 7\")   Wt 79 kg (174 lb 3.2 oz)   SpO2 98%   BMI 27.28 kg/m²     Physical Exam  Vitals and nursing note reviewed.   Constitutional:       Appearance: Normal appearance.   Cardiovascular:      Rate and Rhythm: Normal rate and regular rhythm.      Heart sounds: Normal heart sounds.   Pulmonary:      Effort: Pulmonary effort is normal.      Breath sounds: Normal breath sounds.   Abdominal:      General: Bowel sounds are normal.      Palpations: Abdomen is soft.   Musculoskeletal:         General: Swelling, tenderness and signs of injury present. No deformity.      Comments: Right olecranon is very mildly to palpation.  No obvious deformity, dislocation.  No crepitus or bony step-offs.  Strength is normal and symmetric, there is full and active range of motion of the elbow.  Shoulder and wrist are also nontender to palpation.  Sensation is intact to light and sharp touch, cap refill less than 2 seconds, 2+ radial pulse.  There is edema and traumatic ecchymosis extending from the elbow to the mid forearm.  No fluctuance, warmth.  No rash.   Skin:     Capillary Refill: Capillary refill takes less than 2 seconds.      Findings: Bruising present. No erythema.   Neurological:      Mental Status: He is alert and oriented to person, place, and time.         Assessment/Plan:     DX-ELBOW-COMPLETE 3+ RIGHT 03/01/2025    Narrative  3/1/2025 9:24 AM    HISTORY/REASON FOR EXAM:  Pain/Deformity Following Trauma; fell onto right elbow three days ago, extensive bruising, olecranon swelling..  .    TECHNIQUE/EXAM DESCRIPTION AND NUMBER OF VIEWS:  3 views of the RIGHT elbow.    COMPARISON: None    FINDINGS: Soft tissue swelling dorsally over the olecranon consistent with olecranon bursitis noted. No joint effusion. No fracture evident. No significant arthritis evident. Bony " alignment intact. No foreign body evident.    Impression  1. Soft tissue swelling dorsally over the olecranon consistent with olecranon bursitis noted.    2. No joint effusion or fracture evident.        Diagnosis and associated orders:   1. Injury of right elbow, initial encounter  - DX-ELBOW-COMPLETE 3+ RIGHT; Future    2. Olecranon bursitis of right elbow    3. Contusion of multiple sites of right upper extremity, initial encounter        Comments/MDM:     Per radiology impression:1. Soft tissue swelling dorsally over the olecranon consistent with olecranon bursitis noted. 2. No joint effusion or fracture evident.  Images reviewed  Recommend rest, elevation, application of ice packs, warm packs, activity as tolerated, compressive Ace wrap's.  Patient declined Ace wrap's.  States he is not in a lot of pain.  He just wanted to make sure that there was no fracture present.  Discussed supportive care measures for bursitis, encouraged him to her avoid placing direct pressure onto his elbow until symptoms have improved.  Return to urgent care should he develop worsening bursitis.  Follow-up with primary care advised.  Patient verbalized understanding send plan of care.       Return to clinic or go to ED if symptoms worsen or persist. Indications for ED discussed at length. Patient/Parent/Guardian voices understanding. Follow-up with your primary care provider in 3-5 days. Red flag symptoms discussed. All side effects of medication discussed including allergic response, GI upset, tendon injury, rash, sedation etc.    Please note that this dictation was created using voice recognition software. I have made a reasonable attempt to correct obvious errors, but I expect that there are errors of grammar and possibly content that I did not discover before finalizing the note.    This note was electronically signed by REZA Grubbs

## 2025-03-06 ENCOUNTER — TELEPHONE (OUTPATIENT)
Dept: HEALTH INFORMATION MANAGEMENT | Facility: OTHER | Age: 75
End: 2025-03-06
Payer: MEDICARE

## 2025-04-12 DIAGNOSIS — E78.00 PURE HYPERCHOLESTEROLEMIA: ICD-10-CM

## 2025-04-14 RX ORDER — ATORVASTATIN CALCIUM 80 MG/1
80 TABLET, FILM COATED ORAL EVERY EVENING
Qty: 100 TABLET | Refills: 3 | Status: SHIPPED | OUTPATIENT
Start: 2025-04-14

## 2025-05-10 ENCOUNTER — HOSPITAL ENCOUNTER (OUTPATIENT)
Dept: LAB | Facility: MEDICAL CENTER | Age: 75
End: 2025-05-10
Attending: NURSE PRACTITIONER
Payer: MEDICARE

## 2025-05-10 DIAGNOSIS — E78.00 PURE HYPERCHOLESTEROLEMIA: ICD-10-CM

## 2025-05-10 DIAGNOSIS — E78.41 ELEVATED LIPOPROTEIN(A): ICD-10-CM

## 2025-05-10 LAB
CHOLEST SERPL-MCNC: 125 MG/DL (ref 100–199)
FASTING STATUS PATIENT QL REPORTED: NORMAL
HDLC SERPL-MCNC: 37 MG/DL
LDLC SERPL CALC-MCNC: 77 MG/DL
TRIGL SERPL-MCNC: 53 MG/DL (ref 0–149)

## 2025-05-10 PROCEDURE — 80061 LIPID PANEL: CPT

## 2025-05-10 PROCEDURE — 36415 COLL VENOUS BLD VENIPUNCTURE: CPT

## 2025-05-10 PROCEDURE — 82172 ASSAY OF APOLIPOPROTEIN: CPT

## 2025-05-12 ENCOUNTER — OFFICE VISIT (OUTPATIENT)
Dept: VASCULAR LAB | Facility: MEDICAL CENTER | Age: 75
End: 2025-05-12
Attending: NURSE PRACTITIONER
Payer: MEDICARE

## 2025-05-12 VITALS
SYSTOLIC BLOOD PRESSURE: 132 MMHG | BODY MASS INDEX: 27.97 KG/M2 | DIASTOLIC BLOOD PRESSURE: 77 MMHG | HEIGHT: 66 IN | HEART RATE: 66 BPM | WEIGHT: 174 LBS

## 2025-05-12 DIAGNOSIS — I71.21 ANEURYSM OF ASCENDING AORTA WITHOUT RUPTURE (HCC): ICD-10-CM

## 2025-05-12 DIAGNOSIS — I10 ESSENTIAL HYPERTENSION: ICD-10-CM

## 2025-05-12 DIAGNOSIS — E78.00 PURE HYPERCHOLESTEROLEMIA: ICD-10-CM

## 2025-05-12 DIAGNOSIS — Z78.9 STATIN INTOLERANCE: ICD-10-CM

## 2025-05-12 DIAGNOSIS — E78.41 ELEVATED LIPOPROTEIN(A): ICD-10-CM

## 2025-05-12 DIAGNOSIS — Z95.2 S/P AVR (AORTIC VALVE REPLACEMENT): ICD-10-CM

## 2025-05-12 LAB — APO B100 SERPL-MCNC: 77 MG/DL (ref 66–133)

## 2025-05-12 PROCEDURE — 3075F SYST BP GE 130 - 139MM HG: CPT | Performed by: NURSE PRACTITIONER

## 2025-05-12 PROCEDURE — 99212 OFFICE O/P EST SF 10 MIN: CPT

## 2025-05-12 PROCEDURE — 99214 OFFICE O/P EST MOD 30 MIN: CPT | Performed by: NURSE PRACTITIONER

## 2025-05-12 PROCEDURE — 3078F DIAST BP <80 MM HG: CPT | Performed by: NURSE PRACTITIONER

## 2025-05-12 ASSESSMENT — ENCOUNTER SYMPTOMS
MYALGIAS: 0
PALPITATIONS: 0
BRUISES/BLEEDS EASILY: 1
CLAUDICATION: 0
BLOOD IN STOOL: 0
SPEECH CHANGE: 0
FOCAL WEAKNESS: 0
DIZZINESS: 0
WEAKNESS: 0
HEADACHES: 0
SHORTNESS OF BREATH: 0
BLURRED VISION: 0
DOUBLE VISION: 0

## 2025-05-12 ASSESSMENT — FIBROSIS 4 INDEX: FIB4 SCORE: 1.17

## 2025-05-12 NOTE — PROGRESS NOTES
Follow Up VASCULAR VISIT  Subjective:   Juan Berumen is a 74 y.o. male who presents today 05/12/2025 for   Chief Complaint   Patient presents with    Follow-Up   Here for f/u of coarctation, valvular heart disease, hypertension, and dyslipidemia    HPI:  Doing well, no concerns  Continues to vape   He wants to quit, but hard time staying committed   Exercising a little less     Coarctation, s/p AVR:   No symptoms.  Feeling well.   Has not seen cardiology since 2020     HTN:  BP at home usually 112-128/70-80's  Replaced home monitor   Tolerating all meds   Denies fatigue  No CP, SOB, palpitations     HLD:  Tolerating addition of Zetia  On atorvastatin 80mg-- no ADRs  Having cramping and muscle aching  Tendonitis in his achilles, not severe    On ASA-- no bleeding issues      Current Outpatient Medications:     Evolocumab, 140 mg, Subcutaneous, Q14 DAYS, Taking    atorvastatin, 80 mg, Oral, Q EVENING, Taking    ciclopirox, Apply to affected fingernail(s) and/or toenail(s) and adjacent skin once daily in combination with weekly nail trimming and periodic nail debridement. Remove with alcohol every 7 days; continue therapy until nail clearance (maximum duration: 48 weeks), Taking    lisinopril, 20 mg, Oral, DAILY, Taking    triamcinolone acetonide, Apply a thin layer two times per day until symptoms resolve., Taking    ezetimibe, 10 mg, Oral, DAILY, Taking    primidone, 50 mg, Oral, QHS, Taking    carvedilol, 3.125 mg, Oral, BID, Taking    tadalafil, 20 mg, Oral, PRN, Taking    coenzyme Q-10, 60 mg, Oral, DAILY, Taking    vitamin E, 180 mg, Oral, DAILY, Taking    fish oil, 1,000 mg, Oral, DAILY, Taking    aspirin, 81 mg, Oral, DAILY, Taking    therapeutic multivitamin-minerals, 1 Tablet, Oral, DAILY, Taking     Social History     Tobacco Use   Smoking Status Former    Current packs/day: 0.00    Average packs/day: 0.5 packs/day for 15.0 years (7.5 ttl pk-yrs)    Types: Cigarettes    Start date: 4/11/1993    Quit date:  "2008    Years since quittin.0   Smokeless Tobacco Never     DIET AND EXERCISE:  Weight Change: down   BMI Readings from Last 5 Encounters:   25 28.08 kg/m²   25 27.28 kg/m²   25 26.59 kg/m²   10/30/24 26.63 kg/m²   24 26.36 kg/m²      Diet: common adult  Exercise: moderate regular exercise program walking 3 miles daily  With dog - limited by weather and knee pain     Review of Systems   Constitutional:  Negative for malaise/fatigue.   HENT:  Negative for nosebleeds.    Eyes:  Negative for blurred vision and double vision.   Respiratory:  Negative for shortness of breath.    Cardiovascular:  Negative for chest pain, palpitations, claudication and leg swelling.   Gastrointestinal:  Negative for blood in stool and melena.   Genitourinary:  Negative for hematuria.   Musculoskeletal:  Negative for joint pain and myalgias.   Neurological:  Negative for dizziness, speech change, focal weakness, weakness and headaches.   Endo/Heme/Allergies:  Bruises/bleeds easily.      Objective:     /77 (BP Location: Left arm, Patient Position: Sitting, BP Cuff Size: Adult)   Pulse 66   Ht 1.676 m (5' 6\")   Wt 78.9 kg (174 lb)   BMI 28.08 kg/m²      BP Readings from Last 5 Encounters:   25 132/77   25 134/70   25 124/70   10/30/24 (!) 152/80   24 120/72      Body mass index is 28.08 kg/m².  Physical Exam  Vitals reviewed.   Constitutional:       General: He is not in acute distress.     Appearance: He is well-developed. He is not diaphoretic.   HENT:      Head: Normocephalic and atraumatic.   Eyes:      General: No scleral icterus.     Conjunctiva/sclera: Conjunctivae normal.   Cardiovascular:      Rate and Rhythm: Normal rate and regular rhythm.      Pulses:           Posterior tibial pulses are 2+ on the right side and 2+ on the left side.      Heart sounds: Murmur heard.   Pulmonary:      Effort: Pulmonary effort is normal. No respiratory distress.      Breath sounds: " Normal breath sounds. No wheezing or rales.   Musculoskeletal:      Right lower leg: No edema.      Left lower leg: No edema.   Skin:     General: Skin is warm and dry.      Coloration: Skin is not pale.   Neurological:      General: No focal deficit present.      Mental Status: He is alert and oriented to person, place, and time.      Coordination: Coordination normal.      Gait: Gait normal.   Psychiatric:         Behavior: Behavior normal.       Lab Results   Component Value Date    CHOLSTRLTOT 125 05/10/2025    LDL 77 05/10/2025    HDL 37 (A) 05/10/2025    TRIGLYCERIDE 53 05/10/2025      Lab Results   Component Value Date    SODIUM 139 01/28/2025    POTASSIUM 4.6 01/28/2025    CHLORIDE 105 01/28/2025    CO2 24 01/28/2025    GLUCOSE 101 (H) 01/28/2025    BUN 13 01/28/2025    CREATININE 1.07 01/28/2025      Lab Results   Component Value Date    WBC 5.1 07/15/2024    RBC 5.24 07/15/2024    HEMOGLOBIN 16.1 07/15/2024    HEMATOCRIT 48.7 07/15/2024    MCV 92.9 07/15/2024    MCH 30.7 07/15/2024    MCHC 33.1 07/15/2024    MPV 9.6 07/15/2024      CTA chest 3/2020   Coarctations aorta similar to previous findings.  Interval sternotomy and placement aortic valvular prosthesis. The ascending aorta measures 3.1 x 3.4 cm as compared to 3.3 x 3.6 cm previously.  Hyperexpanded lungs and scattered linear atelectasis. No consolidation or pleural effusions.    Echo 6/1/21  Left ventricular ejection fraction is visually estimated to be 60%.  Moderately dilated left atrium.  Mild mitral annular calcification.  Known bioprosthetic aortic valve that is functioning normally: Vmax is   2.35 m/s, mean gradient 12 mmHg.  Normal estimated right atrial pressure.   Compared to the images of the prior study done 6/24/2020, no   significant change.    CTA chest 2/2022  1.  Stable appearance of thoracic aorta with coarctation and ectasia.  2.  No evidence for dissection.  3.  Postoperative changes as described    Echo 7/2023  Compared to the  prior study on 06/01/2021, left atrium is now severely dilated.  Normal left ventricular systolic function.   Severely dilated left atrium.  Known bioprosthetic aortic valve that is functioning normally with   normal transvalvular gradients.    MRA chest 2/2024  1.  Overall there is no significant interval change in the proximal descending thoracic aortic coarctation with associated descending thoracic aortic ectasia.  2.  No ascending thoracic aortic ectasia.  3.  No thoracic aortic dissection.    Echo 8/2024  Compared to the images of the prior study on 7/14/23 - there is no significant change.  Normal left ventricular systolic function. The ejection fraction is measured to be 66% by 3D volumetric analysis.  Normal diastolic function. Normal right ventricular systolic function. Known bioprosthetic aortic valve that is functioning normally with normal transvalvular gradients. Mild intravalvular regurgitation.  Medical Decision Making:  Today's Assessment / Status / Plan:     1. Essential hypertension  Referral to Vascular Medicine      2. S/P AVR (aortic valve replacement), bioprosthetic, 2016  EC-ECHOCARDIOGRAM COMPLETE W/O CONT      3. Aneurysm of ascending aorta without rupture (HCC)  Evolocumab (REPATHA) 140 MG/ML Solution Auto-injector SubQ injection pen      4. Elevated lipoprotein(a)  Evolocumab (REPATHA) 140 MG/ML Solution Auto-injector SubQ injection pen      5. Pure hypercholesterolemia  Evolocumab (REPATHA) 140 MG/ML Solution Auto-injector SubQ injection pen    Lipid Profile    APOLIPOPROTEIN B      6. Statin intolerance  Evolocumab (REPATHA) 140 MG/ML Solution Auto-injector SubQ injection pen        Patient Type: Secondary Prevention    Etiology of Established CVD if Present:     1.  Aortic coarctation, asymptomatic, long-standing, mild aneurysmal dilatation distal to the lesion, appears to have a familial component  Stable on recent imaging  - Medical management as above  - Continue surveillance  imaging with CTA/MRA every two years  - Again recommend a screening echocardiogram for all first-degree relatives  - Since he does not have a true aortic aneurysm or dissection I think that genetic testing would be of low yield and not indicated at present  - repeat CTA chest due 2/2026 - not ordered     2.  Aortic valve disease status post bovine aVR in 2016, associated with aortic coarctation as above. No mention that his valve was bicuspid. Mild perivalvular leak   Stable echo 6/2021, 7/2023, 8/2024  Has not seen cardiology since 2020-- prefers to only see vasc med for now-- will continue to monitor valve, we will send back to cardiology if needed in future  - Medical management as above  - Repeat echo- August 2025-- ordered today     Lipid Management:   Qualifies for Statin Therapy Based on 2013 ACC/AHA Guidelines: yes  Calculated 10-Year Risk of ASCVD: N/A  Currently on Statin: Yes  Goal LDL <70, ideal <55  Elevated Lp(a) 220  -Patient with vascular disease, although not atherosclerotic - but considering very elevated lp(a) would like to treat aggressively  Not tolerating zetia due to tendonitis  Not tolerating atorvastatin due to muscle cramping; rosuvastatin caused liver enzyme elevation  Through SDM, patient does not want to add nexletol as he is concerned about tendonitis risk and with continued intolerance to zetia, prefers considering a PCSK9i.  Even on zetia and atorvastatin - he remains above goal by > 20% and will need to either stop statin or lower dosage once we determine cost of PCSK9i.    Would not trial further statins  Plan:  - recommend cascade screening of 1st degree relatives of lp(a)  - start repatha 140mg every 14 days   - continue zetia 10mg daily-- for now    - continue atorva 80mg daily-- for now   - TLC intensification as discussed   - update labs in 3 months   - intensify therapy if remains above goal.    Blood Pressure Management:   Acc/aha bp goal <130/80  BP seems well controlled at  home per report, 120s/50-60s  PCP changed metoprolol to carvedilol in favor of lower dose due to HR <50  HR now in 50's  New monitor very accurate and working well   - Continue carvedilol 3.125mg BID for now  - Monitor HR daily-- if HR <50, call our office  - Continue lisinopril 20mg daily  - Continue home blood pressure monitoring  - Contact office if BP consistently >140/90 for earlier appt, as medications may need to be adjusted.      Glycemic Status: Prediabetes  Lab Results   Component Value Date    HBA1C 5.8 (H) 01/28/2025   Decreased from last, continue diet/exercise and provided prediabetes handout for review   Plan:  - continue healthy diet, weight reduction, daily physical activity   - consider metformin up to 850mg BID to slow or offset progression to T2D as per DPP trial   - monitor A1c prior to next appt    Anti-Platelet/Anti-Coagulant Tx: yes  Patient has completed 3 month course of anticoagulation after valve replacement   - Continue indefinite aspirin    Smoking:  reports that he quit smoking about 17 years ago. His smoking use included cigarettes. He started smoking about 32 years ago. He has a 7.5 pack-year smoking history. He has never used smokeless tobacco.   Continues to vape with nicotine in cartridges.    Quit completely, then restarted due to worsening mood  Provided strong recommendation for complete cessation and informed this is the primary contributor to the majority of all ASCVD and cancer-related conditions and can result in significant morbidity and early mortality.   - vaping about 20 puffs per day   - pick a quit date   - reviewed resources for cessation including tobacco cessation clinic visit, pharmacotx meds, quit lines  - review at every visit   Provided 1 minutes of face-to-face counseling on above topics    Physical Activity: continue healthy activity with walking to improve CV fitness, see care instructions for additional details.      Weight Management and Nutrition: Heart  healthy dietary patterns were discussed with patient at this visit including DASH, Mediterranean diet, low sodium and/or as outlined in care instructions.  Decrease sweets/treats and carbohydrates, focus more on lean proteins and fresh veggies/greens.  Encourage to reduce portion sizes.        Instructed to follow-up with PCP for remainder of adult medical needs: yes  We will partner with other providers in the management of established vascular disease and cardiometabolic risk factors.    Studies to Be Obtained:    1.  Echocardiogram- August 2025     2.  CTA chest 2/2026- not ordered     Labs to Be Obtained: lipid, Apo B    Follow up in: 3 months     KATE Aguirre.   [facility]   [facility]   [phone]

## 2025-05-12 NOTE — Clinical Note
Hey Ladies-- new Repatha start-- I told him we would be in touch with cost analysis... statin intolerant, LDL remains above goal-- see my note for discussion. Thanks!!! Castillo

## 2025-05-13 ENCOUNTER — TELEPHONE (OUTPATIENT)
Dept: VASCULAR LAB | Facility: MEDICAL CENTER | Age: 75
End: 2025-05-13
Payer: MEDICARE

## 2025-05-13 NOTE — TELEPHONE ENCOUNTER
Received New Start request via [employer]  for Evolocumab (REPATHA) 140 MG/ML Solution Auto-injector SubQ injection pen. (Quantity:6 mls, Day Supply:84)     Insurance: [employer]   Member ID:  [id number]  BIN: [id number]  PCN: [id number]  Group: [facility]     Ran Test claim via [place] & medication Rejects stating prior authorization is required.    LONI Alves, PhT  Vascular Pharmacy Liaison (Rx Coordinator)  P: [phone]  5/13/2025 12:00 PM

## 2025-05-13 NOTE — TELEPHONE ENCOUNTER
Prior Authorization for Evolocumab (REPATHA) 140 MG/ML Solution Auto-injector SubQ injection pen.  (Quantity: 6 mls, Days: 84) has been submitted via Cover My Meds: Key ([id number])    Insurance: [facility] PLUS     Will follow up in 24-48 business hours.     LONI Alves, PhT  Vascular Pharmacy Liaison (Rx Coordinator)  P: [phone]  5/13/2025 12:24 PM

## 2025-05-15 ENCOUNTER — OFFICE VISIT (OUTPATIENT)
Dept: MEDICAL GROUP | Facility: MEDICAL CENTER | Age: 75
End: 2025-05-15
Payer: MEDICARE

## 2025-05-15 VITALS
SYSTOLIC BLOOD PRESSURE: 128 MMHG | HEART RATE: 65 BPM | HEIGHT: 66 IN | DIASTOLIC BLOOD PRESSURE: 76 MMHG | BODY MASS INDEX: 27.51 KG/M2 | TEMPERATURE: 96.8 F | OXYGEN SATURATION: 98 % | WEIGHT: 171.19 LBS

## 2025-05-15 DIAGNOSIS — L82.1 SEBORRHEIC KERATOSES: Primary | ICD-10-CM

## 2025-05-15 DIAGNOSIS — I10 ESSENTIAL HYPERTENSION: ICD-10-CM

## 2025-05-15 DIAGNOSIS — F41.1 GAD (GENERALIZED ANXIETY DISORDER): ICD-10-CM

## 2025-05-15 DIAGNOSIS — E78.00 PURE HYPERCHOLESTEROLEMIA: ICD-10-CM

## 2025-05-15 DIAGNOSIS — Z72.89 CURRENT EVERY DAY VAPING: ICD-10-CM

## 2025-05-15 PROBLEM — C61 MALIGNANT NEOPLASM OF PROSTATE (HCC): Status: ACTIVE | Noted: 2018-07-01

## 2025-05-15 PROBLEM — C61 MALIGNANT NEOPLASM OF PROSTATE (HCC): Status: RESOLVED | Noted: 2018-07-01 | Resolved: 2025-05-15

## 2025-05-15 PROBLEM — F41.8 SITUATIONAL ANXIETY: Status: RESOLVED | Noted: 2024-07-24 | Resolved: 2025-05-15

## 2025-05-15 PROCEDURE — 99214 OFFICE O/P EST MOD 30 MIN: CPT | Performed by: FAMILY MEDICINE

## 2025-05-15 PROCEDURE — 3074F SYST BP LT 130 MM HG: CPT | Performed by: FAMILY MEDICINE

## 2025-05-15 PROCEDURE — 3078F DIAST BP <80 MM HG: CPT | Performed by: FAMILY MEDICINE

## 2025-05-15 RX ORDER — ESCITALOPRAM OXALATE 5 MG/1
5 TABLET ORAL DAILY
Qty: 90 TABLET | Refills: 1 | Status: SHIPPED | OUTPATIENT
Start: 2025-05-15

## 2025-05-15 ASSESSMENT — FIBROSIS 4 INDEX: FIB4 SCORE: 1.17

## 2025-05-15 NOTE — PROGRESS NOTES
Verbal consent was acquired by the patient to use [employer] ambient listening note generation during this visit: YES    CC: skin lesions    Assessment & Plan:     1. Seborrheic keratoses (Primary)  History and exam are concerning for multiple actinic keratoses on patient's back.  Patient is asymptomatic.  Patient provided.    2. TOM (generalized anxiety disorder)  Chronic, persistent TOM.  Patient was previously taking Xanax as needed for worsening anxiety or panic attacks.  However, persistent TOM is bothersome and affecting life. I had long discussion with patient.  He is interested in treatment.  - escitalopram (LEXAPRO) 5 MG tablet; Take 1 Tablet by mouth every day.  Dispense: 90 Tablet; Refill: 1  - side effects and expectation discussed  - follow up in 3 months     4. Current every day vaping  Patient has been getting nicotine due to increased social stress.  I had long discussions with patient but given his medical history, recently recommended repeat cessation.  He has untreated TOM which might contribute to the need to use nicotine for stress management.  Anxiety is discussed above.    5. Essential hypertension  Chronic, controlled with carvedilol 3.125 twice daily, lisinopril 10 mg daily, no s/e reported, will continue.      6. Pure hypercholesterolemia  Chronic, currently being managed by vascular medicine.  Patient is currently taking Zetia daily and Lipitor intermittently.  He will start Repatha in near future.  - Follow-up with plastic medicine as directed.           Subjective:       HPI:     History of Present Illness  The patient presents for evaluation of moles, anxiety, and elevated LDL.    He expresses concern regarding a mole on his back, which was previously photographed and sent to the clinic. The mole is not associated with any itching or discomfort. Additionally, he reports the presence of a larger mole that has since detached. He recalls having multiple benign lesions removed in the past,  "including one on his back.    He acknowledges experiencing anxiety, which he has been managing without medication. He reports a tendency towards catastrophic thinking and difficulty falling asleep due to racing thoughts. He used to smoke but now vapes to help with anxiety. He complains of increased social stress. He has attempted to quit vaping on five separate occasions. He has previously been prescribed medication for TOM but discontinued their use due to perceived worsening of his condition. His wife also has TOM.     His vascular doctor is considering changing his Repatha dosage to every 2 weeks because his LDL is still slightly high. He is currently on a high dose of statins and experiences muscle pain, which he believes may be due to the statins.    SOCIAL HISTORY  The patient admits to vaping.    FAMILY HISTORY  His mother and sister take medication for anxiety. His brother has dementia.      Current Medications[1]     Objective:     Exam:  /76 (BP Location: Right arm, Patient Position: Sitting, BP Cuff Size: Adult long)   Pulse 65   Temp 36 °C (96.8 °F) (Temporal)   Ht 1.676 m (5' 6\")   Wt 77.7 kg (171 lb 3 oz)   SpO2 98%   BMI 27.63 kg/m²  Body mass index is 27.63 kg/m².    Constitutional: awake, alert, in no distress.  Skin: Warm, dry, good turgor, no rashes, bruises, ulcers in visible areas.  - multiple non-tender, non-erythematous, papules on his back with greasy and stuck-on appearance.   Eye: conjunctiva clear, lids neg for edema or lesions.  ENMT: TM and auditory canals wnl. Oral and nasal mucosa wnl. Lips without lesions, good dentition, oropharynx clear.  Neck: Trachea midline, no masses, no thyromegaly. No cervical or supraclavicular lymphadenopathy  Respiratory: Unlabored respiratory effort, lungs clear to auscultation, no wheezes, no rales.  Cardiovascular: Normal S1, S2, no murmur, no pedal edema.  Psych: Oriented x3, affect and mood wnl, intact judgement and insight.     Return in " about 3 months (around 8/15/2025) for Multiple issues.    We use [employer] ([employer]-powered program) to document the visit. I also use Dragon (voice recognition software) to dictate part of the note. I have made every reasonable attempt to correct obvious errors, but I expect that there are errors of grammar and possibly content that I did not discover before finalizing the note.             [1]   Current Outpatient Medications:     escitalopram (LEXAPRO) 5 MG tablet, Take 1 Tablet by mouth every day., Disp: 90 Tablet, Rfl: 1    Evolocumab (REPATHA) 140 MG/ML Solution Auto-injector SubQ injection pen, Inject 1 mL under the skin every 14 days., Disp: 6 Each, Rfl: 3    atorvastatin (LIPITOR) 80 MG tablet, TAKE 1 TABLET BY MOUTH ONCE DAILY IN THE EVENING, Disp: 100 Tablet, Rfl: 3    ciclopirox (PENLAC) 8 % solution, Apply to affected fingernail(s) and/or toenail(s) and adjacent skin once daily in combination with weekly nail trimming and periodic nail debridement. Remove with alcohol every 7 days; continue therapy until nail clearance (maximum duration: 48 weeks), Disp: 6 mL, Rfl: 2    lisinopril (PRINIVIL) 20 MG Tab, Take 1 tablet by mouth once daily, Disp: 100 Tablet, Rfl: 3    triamcinolone acetonide (KENALOG) 0.1 % Ointment, Apply a thin layer two times per day until symptoms resolve., Disp: 30 g, Rfl: 2    ezetimibe (ZETIA) 10 MG Tab, Take 1 Tablet by mouth every day., Disp: 100 Tablet, Rfl: 3    primidone (MYSOLINE) 50 MG Tab, TAKE 1 TABLET BY MOUTH AT BEDTIME, Disp: 90 Tablet, Rfl: 3    carvedilol (COREG) 3.125 MG Tab, Take 1 tablet by mouth twice daily, Disp: 200 Tablet, Rfl: 3    tadalafil (CIALIS) 20 MG tablet, Take 20 mg by mouth as needed for Erectile Dysfunction., Disp: , Rfl:     coenzyme Q-10 30 MG capsule, Take 60 mg by mouth every day., Disp: , Rfl:     vitamin E 180 MG (400 UNIT) Cap, Take 180 mg by mouth every day., Disp: , Rfl:     Omega-3 Fatty Acids (FISH OIL) 1000 MG Cap capsule, Take 1,000 mg by mouth  every day., Disp: , Rfl:     aspirin (ASA) 81 MG Chew Tab chewable tablet, Take 1 Tab by mouth every day., Disp: 100 Tab, Rfl: 11    therapeutic multivitamin-minerals (THERAGRAN-M) TABS, Take 1 Tab by mouth every day., Disp: , Rfl:

## 2025-05-19 NOTE — TELEPHONE ENCOUNTER
Attempted to contact patient at [phone] to discuss [facility] and services/benefits offered. No answer, left voicemail.    LONI Alves, PhT  Vascular Pharmacy Liaison (Rx Coordinator)  P: [phone]  5/19/2025 10:01 AM

## 2025-05-19 NOTE — TELEPHONE ENCOUNTER
New PA  submitted for Evolocumab (REPATHA) 140 MG/ML Solution Auto-injector SubQ injection pen.  has been approved for a quantity of 6 mls , day supply 84    PA reference number: [id number]  Insurance: [facility] PLUS   Effective dates: 05/13/2025-11/13/2025  Copay: $117.50 (no SCP discount)     Is patient eligible to fill with [facility]? Yes    Next Steps: The patient's copay exceeds $5.00. Proceed with contacting patient to offer financial assistance.    LONI Alves, PhT  Vascular Pharmacy Liaison (Rx Coordinator)  P: [phone]  5/19/2025 10:00 AM

## 2025-05-20 ENCOUNTER — DOCUMENTATION (OUTPATIENT)
Dept: PHARMACY | Facility: MEDICAL CENTER | Age: 75
End: 2025-05-20
Payer: MEDICARE

## 2025-05-20 PROCEDURE — RXMED WILLOW AMBULATORY MEDICATION CHARGE: Performed by: NURSE PRACTITIONER

## 2025-05-20 NOTE — PROGRESS NOTES
PHARMACIST CLINICAL ONBOARDING - Clinical Onboarding -   Result = Complete, Next card status: New Start    Therapeutic Category: Hyperlipidemia  ICD10:   Diagnosis Details: Pure hypercholesterolemia [E78.00]  Medication(s) associated with Therapeutic Category: Repatha SureClick Solution Auto-injector 140 MG/ML  Medication(s) prescribed for FDA approved indication?   -Repatha SureClick Solution Auto-injector 140 MG/ML: Yes  Full drug therapy: Repatha 140mg/mL SureClick pen injecting 1mL (140mg) under the skin every 14 days + [Atorvastatin 80mg by mouth daily + Fish Oil 1000mg by mouth daily + CoQ10 60mg by mouth daily]  Past treatments: Ezetimibe, Atorvastatin, Pravastatin, Fish Oil  Goals of therapy: Promote or maintain optimal therapy administration and adherence, Mitigation of side effects, Implement lifestyle modifications: diet, exercise, weight loss, and/or smoking cessation, Other - Achieve goal LDL levels (<70 mg/dL and ideally < 55 mg/dL) to reduce risk of cardiovascular events    Regimen Appropriateness Review: Hyperlipidemia  Any concerning drug and/or non-drug allergies? No  Any concerning drug interactions (drug-drug or dietary)? No  Any concern with prescribed dose (appropriateness, renal, and hepatic adjustments needed)? No  Any comorbidities, past medical history, precautions, or contraindications that pose a medication safety concern? No  Any relevant lab work needed that affects the initial start date? No  Any issues with patient's ability to self-administer medication? No      Liaison noted patient declined initial clinical pharmacist counseling preferring to receive in-person counseling by dispensing pharmacist during medication pickup.

## 2025-05-20 NOTE — TELEPHONE ENCOUNTER
Patient Phone Number: [phone]  Medication: REPATHA SURECLICK 140MG/ML SOLUTION AUTO INJECTOR  (Quantity 6 mls, Day Supply 84)  Copay: $117.50  Household size:2  Annual Household Adjusted Gross Income: $60,000  Additional information/consent to FA: yes!     LONI Alves, PhT  Vascular Pharmacy Liaison (Rx Coordinator)  P: [phone]  5/20/2025 1:31 PM

## 2025-05-20 NOTE — TELEPHONE ENCOUNTER
S/w pt today and offered to be onboarded for mail order and RXC services for his Repatha. Per pt, he states that he has been picking up a script for his wife at the [facility] since.     Throughout our phone call, we discussed millicent options for him, and he verbally consented to initiate the screening process for the FA enrollment. Went ahead and submitted all the necessary information, and is now approved for the full calendar year. Pt declined to use our mail order delivery services, and he would like to pick it up instead.     Set up his first initial fill for Repatha  for 5/21. Pt would like to start with a month supply fill., and if no issues such as possible side effects, will proceed in scheduling a 3 month supply thereafter.     Notified and updated [facility] pharmacy for the status.     LONI Alves, PhT  Vascular Pharmacy Liaison (Rx Coordinator)  P: [phone]  5/20/2025 2:09 PM

## 2025-05-20 NOTE — TELEPHONE ENCOUNTER
Medication: REPATHA SURECLICK 140MG/ML SOLUTION AUTO INJECTOR   Type of Insurance: Government funded (Medicare/Medicare Advantage)  Type of Financial assistance requested Foundation  Source: [employer]   Source Phone #: [phone]  Outcome: APPROVED   Effective dates: 04/20/2025-04/19/2026  Details/Billing Information:   BIN: [id number]  PCN: [id number]   GRP: [id number]  ID: [id number]  MAX AMOUNT AWARD: $2500/YEAR     Final Copay: $0      LONI Alves, PhT  Vascular Pharmacy Liaison (Rx Coordinator)  P: [phone]  5/20/2025 1:33 PM

## 2025-05-21 NOTE — Clinical Note
[facility]  [address] [place] [address]    [id number]    Juan Berumen  [address] [place] [address]    May 21, 2025    Member Name: Juan Berumen   Member Number: [id number]   Reference Number: [id number]   Approved Services: Echos and EKG   Approved Service Dates: 05/12/2025 - 08/10/2025   Requesting Provider: Tawnya Tang   Requested Provider: [facility]     Dear Juan Lalorenetta Berumen:    The following medical service(s) requested by Tawnya Tang have been approved:    Procedure Code Procedure Code Name Requested Quantity Approved Quantity Status   78092 (CPT®) [place] ECHO HEART XTHORACIC,COMPLETE W DOPPLER 1 1 Authorized       Approved Quantity means the number of visits approved for medication treatments and/or medical services.    The services should be provided by [facility] no later than 08/10/2025. Please contact the provider listed below with any questions.     Provider Information:  [facility]  [phone]    Your plan benefit may require a deductible, co-payment or coinsurance for these services. This authorization does not guarantee [facility] will pay the claim for services that you receive. Payment by [facility] for these services is subject to the terms of your Evidence of Coverage, your eligibility at the time of service, and confirmation of benefit coverage.    For any questions or additional information, please contact Customer Service:    jail Plus Toll Free: [phone]  TTY users dial: [phone]   Call Center Hours:  Oct 1 - Mar 31, Mon - Fri 7 AM to 8 PM PST  Oct 1 - Mar 31, Sat - Sun 8 AM to 8 PM PST  Apr 1 - Sep 30, Mon - Fri 7 AM to 8 PM PST   Office Hours: Mon - Fri 8 AM to 5 PM PST   E-mail: [email or web address]   Website:  [email or web address]      This information is available for free in other languages. Please contact  Customer Service at the phone number above for more information. [facility] complies with applicable Federal civil rights laws and does not discriminate on the basis of race, color, national origin, age, disability or sex.    Sincerely,     Healthcare Utilization Management Department     Cc: [facility]   Tawnya Tang    Multi-Language Insert  Multi- Services  English: We have free  services to answer any questions you may have about our health or drug plan.  To get an , just call us at [phone].  Someone who speaks English/Language can help you.  This is a free service.  Pakistani: Tenemos servicios de intérprete sin costo alguno  para responder cualquier pregunta que pueda tener sobre nuestro plan de pily o medicamentos. Para hablar con un intérprete, por favor llame al [phone]. Alguien que hable español le podrá ayudar. Venus es un servicio gratuito.  Chinese Mandarin: ?????????????????????????????? ???????????????? [phone]????????????????? ?????????  Chinese Cantonese: ?????????????????????????????? ????????????? [phone]???????????????????? ????????  Tagalog:  Mayroon kaming libreng serbisysosa finesaantoine thompsona kenisha perduegtu o panggamot.  isadora Falcon  [phone]. Maemilio Wells.  Luis granados.  Montserratian:  Nous proposons roberta services gratuits d'interprétation pour répondre à toutes palmria questions relatives à notre régime de santé ou d'assurance-médicaments. Pour accéder au service d'interprétation, il vous suffit de nous appeler au [phone]. Un interlocuteur parlansidney Françemily pourra vous aider. Ce service est gratuit.  Armenian:  Jim briggs có d?ch v? thông d?ch mi?n phí ð? tr? l?i các câu h?i v? chýõng s?c kh?e và chýõng [facility]?c  men. N?u quí v? c?n thông d?ch viên selvin g?i [phone] s? có nhân viên nói ti?ng Vi?t giúp ð? quí v?. Ðây là d?ch v? mi?n phí .  Romanian:  Unser kostenloser Dolmetscherservice beantwortet Ihren Fragen zu unserem Gesundheits- und Arzneimittelplan. Unsere Dolmetscher erreichen Sie [phone]. Man wird Ihnen abdullahi auf [facility]. Dieser Service ist [facility].  Occitan:  ??? ?? ?? ?? ?? ??? ?? ??? ?? ???? ?? ?? ???? ???? ????. ?? ???? ????? ?? [phone] ??? ??? ????.  ???? ?? ???? ?? ?? ????. ? ???? ??? ?????.   Turkish: Åñëè ó âàñ âîçíèêíóò âîïðîñû îòíîñèòåëüíî ñòðàõîâîãî èëè ìåäèêàìåíòíîãî ïëàíà, âû ìîæåòå âîñïîëüçîâàòüñÿ íàøèìè áåñïëàòíûìè óñëóãàìè ïåðåâîä÷èêîâ. ×òîáû âîñïîëüçîâàòüñÿ óñëóãàìè ïåðåâîä÷èêà, ïîçâîíèòå íàì ïî òåëåôîíó [phone]. Âàì îêàæåò ïîìîùü ñîòðóäíèê, êîòîðûé ãîâîðèò ïî-póññêè. Äàííàÿ óñëóãà áåñïëàòíàÿ.  Romansh: ÅääÇ äÞÏã ÎÏãÇÊ ÇáãÊÑÌã ÇáÝæÑí ÇáãÌÇäíÉ ááÅÌÇÈÉ Úä Ãí ÃÓÆáÉ ÊÊÚáÞ ÈÇáÕÍÉ Ãæ ÌÏæá ÇáÃÏæíÉ áÏíäÇ. ááÍÕæá Úáì ãÊÑÌã ÝæÑí¡ áíÓ Úáíß Óæì ÇáÇÊÕÇá ÈäÇ Úáì [phone] . ÓíÞæã ÔÎÕ ãÇ íÊÍÏË ÇáÚÑÈíÉ ÈãÓÇÚÏÊß. åÐå ÎÏãÉ ãÌÇäíÉ.  Richar: ????? ????????? ?? ??? ?? ????? ?? ???? ??? ???? ???? ?? ?????? ?? ???? ???? ?? ??? ????? ??? ????? ???????? ?????? ?????? ???. ?? ???????? ??????? ???? ?? ???, ?? ???? [phone] ?? ??? ????. ??? ??????? ?? ?????? ????? ?? ???? ??? ?? ???? ??. ?? ?? ????? ???? ??.   Divehi:  È disponibile un servizio di interpretariato gratuito per rispondere a eventuali domande sul nostro piano sanitario e farmaceutico. Per un interprete, contattare il sundeep [phone]. Un nostro incaricato kori parla Italianovi fornirà l'assistenza necessaria. È un servizio gratuito.  Portugués:  Dispomos de serviços de interpretação gratuitos para responder a qualquer questão que tenha acerca do nosso plano de saúde ou de medicação. Para obter um intérprete, contacte-nos através do número [phone]. Irá encontrar alguém que fale o idioma  Português para o  harry. Venus serviço é gratuito.  [facility] Creole:  Nou genyen sèvis entèprèt gratis maddison reponn tout kesyon ou ta genyen konsènan plan medikal oswa dwòg nou an.  Maddison jwenn yon entèprèt, jis rele nou nan [phone]. Yon moun ki pale Kreyòl kapab jayy w.  Sa a se yon sèvis ki gratis.  Polish:  Umo¿liwiamy bezp³atne skorzystanie z us³ug t³umacza ustnego, który pomo¿e w uzyskaniu odpowiedzi na temat planu zdrowotnego lub dawkowania leków. Anayeli skorzystaæ z pomocy t³umacza znaj¹cego jonathan reza¿y zadzwoniæ pod numer [phone]. Ta us³uga jest bezp³atna.  Hungarian: ????? ??????? ????????????????????? ??????????????????????????????????[phone] ???????????????? ? ????????????????? ?????

## 2025-05-22 ENCOUNTER — PHARMACY VISIT (OUTPATIENT)
Dept: PHARMACY | Facility: MEDICAL CENTER | Age: 75
End: 2025-05-22
Payer: COMMERCIAL

## 2025-05-29 ENCOUNTER — DOCUMENTATION (OUTPATIENT)
Dept: VASCULAR LAB | Facility: MEDICAL CENTER | Age: 75
End: 2025-05-29

## 2025-05-29 ENCOUNTER — HOSPITAL ENCOUNTER (OUTPATIENT)
Facility: MEDICAL CENTER | Age: 75
End: 2025-05-29
Attending: NURSE PRACTITIONER
Payer: MEDICARE

## 2025-05-29 DIAGNOSIS — Z95.2 S/P AVR (AORTIC VALVE REPLACEMENT): ICD-10-CM

## 2025-05-29 LAB
LV EJECT FRACT MOD 2C 99903: 57.46
LV EJECT FRACT MOD 4C 99902: 66.17
LV EJECT FRACT MOD BP 99901: 62.3

## 2025-05-29 PROCEDURE — 93306 TTE W/DOPPLER COMPLETE: CPT | Mod: 26 | Performed by: STUDENT IN AN ORGANIZED HEALTH CARE EDUCATION/TRAINING PROGRAM

## 2025-05-29 PROCEDURE — 93306 TTE W/DOPPLER COMPLETE: CPT

## 2025-05-29 NOTE — Clinical Note
Bc - update paul Kd - let patient know that echo looks fine. We will go over deets at follow up visit

## 2025-05-29 NOTE — PROGRESS NOTES
SAVR 2016   Valve appears to be funcitoning nromally on echo with good EF.  Will repeat echo in 2 years - may 2027    Michael Bloch, MD  [facility]

## 2025-05-30 ENCOUNTER — DOCUMENTATION (OUTPATIENT)
Dept: PHARMACY | Facility: MEDICAL CENTER | Age: 75
End: 2025-05-30
Payer: MEDICARE

## 2025-05-30 NOTE — PROGRESS NOTES
PHARMACIST CLINICAL MANAGEMENT - INITIAL CLINICAL ASSESSMENT -   Result = Complete, Next card status: Stable/Non-Priority Follow Up    INITIAL CLINICAL ASSESSMENT  Repatha SureClick Solution Auto-injector 140 MG/ML - Hyperlipidemia - Modified initial    Encounter Information: Telephonic assessment, 05/30/2025, Patient    SUBJECTIVE  Signs and symptoms of condition   -Hyperlipidemia: Reviewed, none reported     Functional Limitations: Limited use of arms, hands, or feet    High risk features: None    Communicable infections: No communicable infections    OBJECTIVE  Clinically relevant medication and/or disease state labs   -Hyperlipidemia: Reviewed, New cardio events since we last spoke: none   Clinically Relevant, Abnormal Labs from 5/10/25 unless otherwise noted:   - CBC from 7/15/24: WNL    - Chem7 from 1/28/25: Glucose 101 (H)   - LFTs from 1/28/25: WNL    - Lipids:  TG 53 HDL 37 LDL 77   - Lp(a) from 10/15/24: 220    - ApoB: 77   - A1c from 1/28/25: 5.8   - HeFH/HoFH: not specified in EMR    - BP/HR: WNL (5/15/25)    ASSESSMENT  Drug therapy, administration, and proper use   -Repatha SureClick Solution Auto-injector 140 MG/ML: Reviewed, Repatha 140mg/mL SureClick pen injecting 1mL (140mg) under the skin every 14 days + [Atorvastatin 80mg by mouth daily + Fish Oil 1000mg by mouth daily + CoQ10 60mg by mouth daily]   - Administration: self-injected Repatha #1 pen into abdomen; no issues with device administration; reviewed following steps: reviewed to take the device out of the fridge at least 30 minutes prior to injection; wash hands, inspect device (liquid should be clear, colorless, or slightly yellow), if self-injecting pt can inject in abdomen (at least 2 inches away from navel) or front part of thighs (at least 2 inches below groin and 2 inches above knee); if his spouse helps, she can give the injection in the back of your upper arm; hold Repatha at the injection site at a 90 degree angle, press pen  firmly against skin and when ready press and release the grey activator button; keep pressing Repatha against the skin; the 2nd click indicates the injection is complete and the viewing window will turn yellow; the entire process may take ~15 seconds to complete; remove pen and dispose    Proper handling and disposal   -Repatha SureClick Solution Auto-injector 140 MG/ML: Reviewed, Disposing of pen in sharps container     Storage and excursion   -Repatha SureClick Solution Auto-injector 140 MG/ML: Reviewed, Reviewed to store under refrigerated temperatures in original box; pen can be stored out of the fridge at room temperature for no more than 30 days    Reported missed doses in the past month   -Repatha SureClick Solution Auto-injector 140 MG/ML: Following treatment plan as prescribed    Adherence strategies   -Repatha SureClick Solution Auto-injector 140 MG/ML: Reviewed, no missed doses; will inject every other Thursday     Missed dose management   -Repatha SureClick Solution Auto-injector 140 MG/ML: Reviewed, If it’s within 7 days of missed dose, take it asap and resume normal schedule; if it’s beyond 7 days patient should to wait until the next dose on the original schedule.    Potential common side effects   -Repatha SureClick Solution Auto-injector 140 MG/ML: Patient declined    Potential common side effect mitigation strategies   -Repatha SureClick Solution Auto-injector 140 MG/ML: Patient declined    Serious side effects, precautions, and contraindications   -Repatha SureClick Solution Auto-injector 140 MG/ML: Patient declined    Status of current side effects   -Repatha SureClick Solution Auto-injector 140 MG/ML: No side effects reported    Non-pharmacologic drug and disease state counseling   -Hyperlipidemia: Reviewed, - Diet: reviewed heart healthy diet including variety of fruits/vegetables, low fat dairy, lean protein (chicken/fish preferred over red meats); bake/grill foods instead of frying; avoiding  sugary drinks/foods; limiting sodium intake; cutting back on saturated and trans fats   - Exercise: walking at least 3 miles/day   - Smoking cessation: former smoker; quit 2008; currently vaping and trying to quit/reduce usage    Relevant immunization recommendations: Reviewed, recommended PCV20; Td/Tdap booster     In the last 4 weeks has the patient missed any days of work, school, or planned activities due to their condition?   -Hyperlipidemia: No    Medication list reconciliation: Reviewed-EMR accurate    When to report changes or new additions to medication list: Educated patient    Clinically significant drug interactions: No drug interactions identified    Common drug interactions and dietary avoidance   -Repatha SureClick Solution Auto-injector 140 MG/ML: Reviewed, none additional     PLAN  Patient's overall status:    -Hyperlipidemia: Improved    Medication appropriateness   -Repatha SureClick Solution Auto-injector 140 MG/ML: Appropriate    Medication status   -Repatha SureClick Solution Auto-injector 140 MG/ML: Continue Therapy    Anticipated or confirmed medication start date   -Repatha SureClick Solution Auto-injector 140 MG/ML: 05/22/2025    Intended duration of medication   -Repatha SureClick Solution Auto-injector 140 MG/ML: Reviewed, until progression, toxicity, or no longer clinically beneficial      Goals of therapy   -Hyperlipidemia: Promote or maintain optimal therapy administration and adherence, Mitigation of side effects, Implement lifestyle modifications: diet, exercise, weight loss, and/or smoking cessation, Other; Achieve goal LDL levels (<70 mg/dL and ideally < 55 mg/dL) to reduce risk of cardiovascular events    Understanding/agreement of the goals of therapy   -Hyperlipidemia: Agrees/understands goals of therapy    Reasons for selecting patient declined, clinician deferred, or unable to assess: patient declined review of side effect/warnings having received prior counseling with no  current side effects noted     Additional summary notes: I had the pleasure of speaking with Juan for modified first cycle check-in call following his start of Repatha. He completed his first injection on 5/22/25 with no reported administration difficulty. He does have a slight tremor in his right arm where he held the pen in his left hand to administer the dose successfully. We discused having assistance from his spouse if needed which he was amendable to. Recommended allowing medication to reach room temperature for less irritation and burning versus injecting immediately after removing pen from fridge. He did report minor myalgias where recommended ice pack/heat and warm baths/showers to help alleviate discomfort. The symptoms are non-limiting at this time and Juan is hopeful Dede can get his LDL to goal level where he may be able to reduce Atorvastatin dose in the future. Encouraged he save the pharmacist line should questions arise and reviewed his Rx Coordinator/liaison will make refill outreach the week of 6/9. Juan thanked me for the time spent reviewing Repatha with no further questions.

## 2025-06-12 PROCEDURE — RXMED WILLOW AMBULATORY MEDICATION CHARGE: Performed by: NURSE PRACTITIONER

## 2025-06-13 ENCOUNTER — PHARMACY VISIT (OUTPATIENT)
Dept: PHARMACY | Facility: MEDICAL CENTER | Age: 75
End: 2025-06-13
Payer: COMMERCIAL

## 2025-07-11 DIAGNOSIS — I10 ESSENTIAL HYPERTENSION: ICD-10-CM

## 2025-07-14 RX ORDER — CARVEDILOL 3.12 MG/1
3.12 TABLET ORAL 2 TIMES DAILY
Qty: 200 TABLET | Refills: 3 | Status: SHIPPED | OUTPATIENT
Start: 2025-07-14

## 2025-07-19 ENCOUNTER — HOSPITAL ENCOUNTER (OUTPATIENT)
Dept: LAB | Facility: MEDICAL CENTER | Age: 75
End: 2025-07-19
Attending: FAMILY MEDICINE
Payer: MEDICARE

## 2025-07-19 DIAGNOSIS — Z85.46 PERSONAL HISTORY OF MALIGNANT NEOPLASM OF PROSTATE: ICD-10-CM

## 2025-07-19 DIAGNOSIS — E78.00 PURE HYPERCHOLESTEROLEMIA: ICD-10-CM

## 2025-07-19 DIAGNOSIS — I10 ESSENTIAL HYPERTENSION: ICD-10-CM

## 2025-07-19 LAB
ALBUMIN SERPL BCP-MCNC: 4.1 G/DL (ref 3.2–4.9)
ALBUMIN/GLOB SERPL: 1.6 G/DL
ALP SERPL-CCNC: 96 U/L (ref 30–99)
ALT SERPL-CCNC: 31 U/L (ref 2–50)
ANION GAP SERPL CALC-SCNC: 9 MMOL/L (ref 7–16)
AST SERPL-CCNC: 26 U/L (ref 12–45)
BASOPHILS # BLD AUTO: 0.7 % (ref 0–1.8)
BASOPHILS # BLD: 0.04 K/UL (ref 0–0.12)
BILIRUB SERPL-MCNC: 0.6 MG/DL (ref 0.1–1.5)
BUN SERPL-MCNC: 10 MG/DL (ref 8–22)
CALCIUM ALBUM COR SERPL-MCNC: 9.3 MG/DL (ref 8.5–10.5)
CALCIUM SERPL-MCNC: 9.4 MG/DL (ref 8.5–10.5)
CHLORIDE SERPL-SCNC: 107 MMOL/L (ref 96–112)
CHOLEST SERPL-MCNC: 78 MG/DL (ref 100–199)
CO2 SERPL-SCNC: 25 MMOL/L (ref 20–33)
CREAT SERPL-MCNC: 1.12 MG/DL (ref 0.5–1.4)
CREAT UR-MCNC: 86.1 MG/DL
EOSINOPHIL # BLD AUTO: 0.1 K/UL (ref 0–0.51)
EOSINOPHIL NFR BLD: 1.8 % (ref 0–6.9)
ERYTHROCYTE [DISTWIDTH] IN BLOOD BY AUTOMATED COUNT: 44 FL (ref 35.9–50)
EST. AVERAGE GLUCOSE BLD GHB EST-MCNC: 114 MG/DL
FASTING STATUS PATIENT QL REPORTED: NORMAL
GFR SERPLBLD CREATININE-BSD FMLA CKD-EPI: 69 ML/MIN/1.73 M 2
GLOBULIN SER CALC-MCNC: 2.5 G/DL (ref 1.9–3.5)
GLUCOSE SERPL-MCNC: 101 MG/DL (ref 65–99)
HBA1C MFR BLD: 5.6 % (ref 4–5.6)
HCT VFR BLD AUTO: 48.3 % (ref 42–52)
HDLC SERPL-MCNC: 45 MG/DL
HGB BLD-MCNC: 15.8 G/DL (ref 14–18)
IMM GRANULOCYTES # BLD AUTO: 0.01 K/UL (ref 0–0.11)
IMM GRANULOCYTES NFR BLD AUTO: 0.2 % (ref 0–0.9)
LDLC SERPL CALC-MCNC: 22 MG/DL
LYMPHOCYTES # BLD AUTO: 1.77 K/UL (ref 1–4.8)
LYMPHOCYTES NFR BLD: 31.8 % (ref 22–41)
MCH RBC QN AUTO: 30.9 PG (ref 27–33)
MCHC RBC AUTO-ENTMCNC: 32.7 G/DL (ref 32.3–36.5)
MCV RBC AUTO: 94.3 FL (ref 81.4–97.8)
MICROALBUMIN UR-MCNC: <1.2 MG/DL
MICROALBUMIN/CREAT UR: NORMAL MG/G (ref 0–30)
MONOCYTES # BLD AUTO: 0.48 K/UL (ref 0–0.85)
MONOCYTES NFR BLD AUTO: 8.6 % (ref 0–13.4)
NEUTROPHILS # BLD AUTO: 3.16 K/UL (ref 1.82–7.42)
NEUTROPHILS NFR BLD: 56.9 % (ref 44–72)
NRBC # BLD AUTO: 0 K/UL
NRBC BLD-RTO: 0 /100 WBC (ref 0–0.2)
PLATELET # BLD AUTO: 278 K/UL (ref 164–446)
PMV BLD AUTO: 9.4 FL (ref 9–12.9)
POTASSIUM SERPL-SCNC: 4.6 MMOL/L (ref 3.6–5.5)
PROT SERPL-MCNC: 6.6 G/DL (ref 6–8.2)
RBC # BLD AUTO: 5.12 M/UL (ref 4.7–6.1)
SODIUM SERPL-SCNC: 141 MMOL/L (ref 135–145)
TRIGL SERPL-MCNC: 55 MG/DL (ref 0–149)
WBC # BLD AUTO: 5.6 K/UL (ref 4.8–10.8)

## 2025-07-19 PROCEDURE — 82570 ASSAY OF URINE CREATININE: CPT

## 2025-07-19 PROCEDURE — 82043 UR ALBUMIN QUANTITATIVE: CPT

## 2025-07-19 PROCEDURE — 83036 HEMOGLOBIN GLYCOSYLATED A1C: CPT

## 2025-07-19 PROCEDURE — 80053 COMPREHEN METABOLIC PANEL: CPT

## 2025-07-19 PROCEDURE — 80061 LIPID PANEL: CPT

## 2025-07-19 PROCEDURE — 84154 ASSAY OF PSA FREE: CPT

## 2025-07-19 PROCEDURE — 85025 COMPLETE CBC W/AUTO DIFF WBC: CPT

## 2025-07-19 PROCEDURE — 36415 COLL VENOUS BLD VENIPUNCTURE: CPT

## 2025-07-19 PROCEDURE — 84153 ASSAY OF PSA TOTAL: CPT

## 2025-07-21 ENCOUNTER — OFFICE VISIT (OUTPATIENT)
Dept: MEDICAL GROUP | Facility: MEDICAL CENTER | Age: 75
End: 2025-07-21
Payer: MEDICARE

## 2025-07-21 VITALS
SYSTOLIC BLOOD PRESSURE: 122 MMHG | TEMPERATURE: 96.9 F | DIASTOLIC BLOOD PRESSURE: 66 MMHG | OXYGEN SATURATION: 98 % | WEIGHT: 166.01 LBS | HEART RATE: 60 BPM | HEIGHT: 66 IN | BODY MASS INDEX: 26.68 KG/M2

## 2025-07-21 DIAGNOSIS — I10 ESSENTIAL HYPERTENSION: Primary | ICD-10-CM

## 2025-07-21 DIAGNOSIS — F41.1 GAD (GENERALIZED ANXIETY DISORDER): ICD-10-CM

## 2025-07-21 DIAGNOSIS — R73.01 IMPAIRED FASTING BLOOD SUGAR: ICD-10-CM

## 2025-07-21 DIAGNOSIS — R73.01 IFG (IMPAIRED FASTING GLUCOSE): ICD-10-CM

## 2025-07-21 PROBLEM — Z72.89 CURRENT EVERY DAY VAPING: Status: RESOLVED | Noted: 2025-05-15 | Resolved: 2025-07-21

## 2025-07-21 PROCEDURE — 3078F DIAST BP <80 MM HG: CPT | Performed by: FAMILY MEDICINE

## 2025-07-21 PROCEDURE — G2211 COMPLEX E/M VISIT ADD ON: HCPCS | Performed by: FAMILY MEDICINE

## 2025-07-21 PROCEDURE — 3074F SYST BP LT 130 MM HG: CPT | Performed by: FAMILY MEDICINE

## 2025-07-21 PROCEDURE — 99214 OFFICE O/P EST MOD 30 MIN: CPT | Performed by: FAMILY MEDICINE

## 2025-07-21 RX ORDER — ALPRAZOLAM 0.25 MG
0.25 TABLET ORAL
Qty: 30 TABLET | Refills: 0 | Status: SHIPPED | OUTPATIENT
Start: 2025-07-21 | End: 2025-08-20

## 2025-07-21 RX ORDER — ESCITALOPRAM OXALATE 5 MG/1
5 TABLET ORAL DAILY
Qty: 100 TABLET | Refills: 3 | Status: SHIPPED | OUTPATIENT
Start: 2025-07-21

## 2025-07-21 ASSESSMENT — FIBROSIS 4 INDEX: FIB4 SCORE: 1.24

## 2025-07-22 LAB
PSA FREE MFR SERPL: NORMAL %
PSA FREE SERPL-MCNC: <0.1 NG/ML
PSA SERPL-MCNC: <0.1 NG/ML (ref 0–4)

## 2025-07-23 NOTE — PROGRESS NOTES
"Verbal consent was acquired by the patient to use [employer] ambient listening note generation during this visit: YES    CC: mental health follow up      Assessment & Plan:     1. Essential hypertension (Primary)  Chronic, controlled with Lisinopril 20 mg qd, no s/e reported, will continue.      2. Pure hypercholesterolemia  Chronic, managed by vascular medicine.   Follow up with vascular medicine as directed.     3. TOM (generalized anxiety disorder)  Chronic, recently started on Lexapro 5 mg qd. He also takes Xanax 0.25 mg PRN for acute worsening anxiety. Negative history of illicit drugs, alcohol abuse. Symptoms are improving with Lexapro.   - escitalopram (LEXAPRO) 5 MG tablet; Take 1 Tablet by mouth every day.  Dispense: 100 Tablet; Refill: 3  - ALPRAZolam (XANAX) 0.25 MG Tab; Take 1 Tablet by mouth 1 time a day as needed for Anxiety for up to 30 days.  Dispense: 30 Tablet; Refill: 0    4. Impaired fasting blood sugar  - Dietary/lifestyle modification and weight loss          Subjective:       HPI:     History of Present Illness    Patient is doing well. He was recently started on Lexapro 5 mg qd for TOM. He endorses improvement of symptoms. He occasionally experiences acute worsening anxiety needing to use Xanax. However, frequency of such episodes is improving.     He continues to take the rest of his medications as directed. He continues to follow up with vascular medicine for management of hyperlipidemia and coartation of aorta s/p repair.     Current Medications[1]     Objective:     Exam:  /66 (BP Location: Left arm, Patient Position: Sitting, BP Cuff Size: Adult)   Pulse 60   Temp 36.1 °C (96.9 °F) (Temporal)   Ht 1.676 m (5' 6\")   Wt 75.3 kg (166 lb 0.1 oz)   SpO2 98%   BMI 26.79 kg/m²  Body mass index is 26.79 kg/m².    Constitutional: awake, alert, in no distress.  Skin: Warm, dry, good turgor, no rashes, bruises, ulcers in visible areas.  Eye: conjunctiva clear, lids neg for edema or " lesions.  Respiratory: Unlabored respiratory effort, lungs clear to auscultation, no wheezes, no rales.  Cardiovascular: Normal S1, S2, no murmur, no pedal edema.  Psych: Oriented x3, affect and mood wnl, intact judgement and insight.     Return in about 6 months (around 1/21/2026) for Multiple issues.    We use [employer] ([employer]-powered program) to document the visit. I also use Dragon (voice recognition software) to dictate part of the note. I have made every reasonable attempt to correct obvious errors, but I expect that there are errors of grammar and possibly content that I did not discover before finalizing the note.    Billing : secondary to the complexity of this patient's illnesses and their interactions.  All problems listed were discussed during the visit, medications were reviewed, and complexities were discussed as well as plan for the future.          [1]   Current Outpatient Medications:     escitalopram (LEXAPRO) 5 MG tablet, Take 1 Tablet by mouth every day., Disp: 100 Tablet, Rfl: 3    ALPRAZolam (XANAX) 0.25 MG Tab, Take 1 Tablet by mouth 1 time a day as needed for Anxiety for up to 30 days., Disp: 30 Tablet, Rfl: 0    carvedilol (COREG) 3.125 MG Tab, Take 1 tablet by mouth twice daily, Disp: 200 Tablet, Rfl: 3    Evolocumab (REPATHA) 140 MG/ML Solution Auto-injector SubQ injection pen, Inject 1 mL under the skin every 14 days., Disp: 6 mL, Rfl: 3    atorvastatin (LIPITOR) 80 MG tablet, TAKE 1 TABLET BY MOUTH ONCE DAILY IN THE EVENING, Disp: 100 Tablet, Rfl: 3    ciclopirox (PENLAC) 8 % solution, Apply to affected fingernail(s) and/or toenail(s) and adjacent skin once daily in combination with weekly nail trimming and periodic nail debridement. Remove with alcohol every 7 days; continue therapy until nail clearance (maximum duration: 48 weeks), Disp: 6 mL, Rfl: 2    lisinopril (PRINIVIL) 20 MG Tab, Take 1 tablet by mouth once daily, Disp: 100 Tablet, Rfl: 3    triamcinolone acetonide (KENALOG) 0.1 %  Ointment, Apply a thin layer two times per day until symptoms resolve., Disp: 30 g, Rfl: 2    ezetimibe (ZETIA) 10 MG Tab, Take 1 Tablet by mouth every day., Disp: 100 Tablet, Rfl: 3    primidone (MYSOLINE) 50 MG Tab, TAKE 1 TABLET BY MOUTH AT BEDTIME, Disp: 90 Tablet, Rfl: 3    tadalafil (CIALIS) 20 MG tablet, Take 20 mg by mouth as needed for Erectile Dysfunction., Disp: , Rfl:     coenzyme Q-10 30 MG capsule, Take 60 mg by mouth every day., Disp: , Rfl:     vitamin E 180 MG (400 UNIT) Cap, Take 180 mg by mouth every day., Disp: , Rfl:     Omega-3 Fatty Acids (FISH OIL) 1000 MG Cap capsule, Take 1,000 mg by mouth every day., Disp: , Rfl:     aspirin (ASA) 81 MG Chew Tab chewable tablet, Take 1 Tab by mouth every day., Disp: 100 Tab, Rfl: 11    therapeutic multivitamin-minerals (THERAGRAN-M) TABS, Take 1 Tab by mouth every day., Disp: , Rfl:

## 2025-07-31 DIAGNOSIS — G25.0 BENIGN ESSENTIAL TREMOR: ICD-10-CM

## 2025-08-04 RX ORDER — PRIMIDONE 50 MG/1
50 TABLET ORAL
Qty: 90 TABLET | Refills: 0 | Status: SHIPPED | OUTPATIENT
Start: 2025-08-04

## 2025-08-20 ENCOUNTER — SPECIALTY PHARMACY (OUTPATIENT)
Dept: VASCULAR LAB | Facility: MEDICAL CENTER | Age: 75
End: 2025-08-20
Payer: MEDICARE

## 2025-08-20 PROCEDURE — RXMED WILLOW AMBULATORY MEDICATION CHARGE: Performed by: NURSE PRACTITIONER

## 2025-08-22 ENCOUNTER — PHARMACY VISIT (OUTPATIENT)
Dept: PHARMACY | Facility: MEDICAL CENTER | Age: 75
End: 2025-08-22
Payer: COMMERCIAL